# Patient Record
Sex: MALE | Race: WHITE | Employment: STUDENT | URBAN - METROPOLITAN AREA
[De-identification: names, ages, dates, MRNs, and addresses within clinical notes are randomized per-mention and may not be internally consistent; named-entity substitution may affect disease eponyms.]

---

## 2021-01-14 ENCOUNTER — TRANSFERRED RECORDS (OUTPATIENT)
Dept: MULTI SPECIALTY CLINIC | Facility: CLINIC | Age: 46
End: 2021-01-14
Payer: OTHER GOVERNMENT

## 2021-03-02 ENCOUNTER — APPOINTMENT (OUTPATIENT)
Dept: URGENT CARE | Facility: CLINIC | Age: 46
End: 2021-03-02
Payer: OTHER GOVERNMENT

## 2021-03-20 ENCOUNTER — HEALTH MAINTENANCE LETTER (OUTPATIENT)
Age: 46
End: 2021-03-20

## 2021-03-25 ENCOUNTER — IMMUNIZATION (OUTPATIENT)
Dept: NURSING | Facility: CLINIC | Age: 46
End: 2021-03-25
Payer: OTHER GOVERNMENT

## 2021-03-25 PROCEDURE — 91303 PR COVID VAC JANSSEN AD26 0.5ML: CPT

## 2021-03-25 PROCEDURE — 0031A PR COVID VAC JANSSEN AD26 0.5ML: CPT

## 2021-09-04 ENCOUNTER — HEALTH MAINTENANCE LETTER (OUTPATIENT)
Age: 46
End: 2021-09-04

## 2021-12-17 PROBLEM — I10 ESSENTIAL HYPERTENSION: Status: ACTIVE | Noted: 2019-01-02

## 2021-12-17 RX ORDER — FAMOTIDINE 20 MG/1
TABLET, FILM COATED ORAL
COMMUNITY
Start: 2021-02-25 | End: 2023-07-08

## 2021-12-17 RX ORDER — ALBUTEROL SULFATE 90 UG/1
AEROSOL, METERED RESPIRATORY (INHALATION)
COMMUNITY
Start: 2020-11-16 | End: 2023-01-12

## 2021-12-17 RX ORDER — LEVOTHYROXINE SODIUM 175 UG/1
TABLET ORAL
COMMUNITY
Start: 2020-12-18 | End: 2021-12-20

## 2021-12-17 RX ORDER — LOSARTAN POTASSIUM 100 MG/1
TABLET ORAL
COMMUNITY
Start: 2020-12-18 | End: 2021-12-17

## 2021-12-17 RX ORDER — ESOMEPRAZOLE MAGNESIUM 40 MG/1
CAPSULE, DELAYED RELEASE ORAL
COMMUNITY
Start: 2021-01-14 | End: 2021-12-17

## 2021-12-20 ENCOUNTER — OFFICE VISIT (OUTPATIENT)
Dept: FAMILY MEDICINE | Facility: CLINIC | Age: 46
End: 2021-12-20
Payer: OTHER GOVERNMENT

## 2021-12-20 VITALS
HEART RATE: 85 BPM | RESPIRATION RATE: 18 BRPM | OXYGEN SATURATION: 98 % | HEIGHT: 72 IN | SYSTOLIC BLOOD PRESSURE: 130 MMHG | BODY MASS INDEX: 29.3 KG/M2 | DIASTOLIC BLOOD PRESSURE: 85 MMHG | WEIGHT: 216.3 LBS | TEMPERATURE: 98.4 F

## 2021-12-20 DIAGNOSIS — E03.9 HYPOTHYROIDISM, UNSPECIFIED TYPE: ICD-10-CM

## 2021-12-20 DIAGNOSIS — I10 ESSENTIAL HYPERTENSION: Primary | ICD-10-CM

## 2021-12-20 DIAGNOSIS — R17 ELEVATED BILIRUBIN: ICD-10-CM

## 2021-12-20 DIAGNOSIS — J45.909 UNCOMPLICATED ASTHMA, UNSPECIFIED ASTHMA SEVERITY, UNSPECIFIED WHETHER PERSISTENT: ICD-10-CM

## 2021-12-20 DIAGNOSIS — Z13.220 SCREENING FOR LIPID DISORDERS: ICD-10-CM

## 2021-12-20 DIAGNOSIS — K22.70 BARRETT'S ESOPHAGUS WITHOUT DYSPLASIA: ICD-10-CM

## 2021-12-20 LAB
ALBUMIN SERPL-MCNC: 4.3 G/DL (ref 3.4–5)
ALP SERPL-CCNC: 56 U/L (ref 40–150)
ALT SERPL W P-5'-P-CCNC: 33 U/L (ref 0–70)
ANION GAP SERPL CALCULATED.3IONS-SCNC: 5 MMOL/L (ref 3–14)
AST SERPL W P-5'-P-CCNC: 20 U/L (ref 0–45)
BILIRUB SERPL-MCNC: 2.8 MG/DL (ref 0.2–1.3)
BUN SERPL-MCNC: 10 MG/DL (ref 7–30)
CALCIUM SERPL-MCNC: 9 MG/DL (ref 8.5–10.1)
CHLORIDE BLD-SCNC: 107 MMOL/L (ref 94–109)
CO2 SERPL-SCNC: 26 MMOL/L (ref 20–32)
CREAT SERPL-MCNC: 0.81 MG/DL (ref 0.66–1.25)
GFR SERPL CREATININE-BSD FRML MDRD: >90 ML/MIN/1.73M2
GLUCOSE BLD-MCNC: 91 MG/DL (ref 70–99)
POTASSIUM BLD-SCNC: 4.2 MMOL/L (ref 3.4–5.3)
PROT SERPL-MCNC: 7.3 G/DL (ref 6.8–8.8)
SODIUM SERPL-SCNC: 138 MMOL/L (ref 133–144)
TSH SERPL DL<=0.005 MIU/L-ACNC: 0.62 MU/L (ref 0.4–4)

## 2021-12-20 PROCEDURE — 80053 COMPREHEN METABOLIC PANEL: CPT | Performed by: PHYSICIAN ASSISTANT

## 2021-12-20 PROCEDURE — 99204 OFFICE O/P NEW MOD 45 MIN: CPT | Performed by: PHYSICIAN ASSISTANT

## 2021-12-20 PROCEDURE — 84443 ASSAY THYROID STIM HORMONE: CPT | Performed by: PHYSICIAN ASSISTANT

## 2021-12-20 PROCEDURE — 36415 COLL VENOUS BLD VENIPUNCTURE: CPT | Performed by: PHYSICIAN ASSISTANT

## 2021-12-20 PROCEDURE — 80061 LIPID PANEL: CPT | Performed by: PHYSICIAN ASSISTANT

## 2021-12-20 RX ORDER — LEVOTHYROXINE SODIUM 175 UG/1
175 TABLET ORAL DAILY
Qty: 90 TABLET | Refills: 4 | Status: SHIPPED | OUTPATIENT
Start: 2021-12-20 | End: 2023-01-12

## 2021-12-20 RX ORDER — LOSARTAN POTASSIUM 100 MG/1
100 TABLET ORAL DAILY
Qty: 90 TABLET | Refills: 4 | Status: SHIPPED | OUTPATIENT
Start: 2021-12-20 | End: 2023-01-12

## 2021-12-20 RX ORDER — ESOMEPRAZOLE MAGNESIUM 40 MG/1
40 CAPSULE, DELAYED RELEASE ORAL
Qty: 90 CAPSULE | Refills: 4 | Status: SHIPPED | OUTPATIENT
Start: 2021-12-20 | End: 2023-01-12

## 2021-12-20 ASSESSMENT — MIFFLIN-ST. JEOR: SCORE: 1894.13

## 2021-12-20 NOTE — PROGRESS NOTES
Assessment & Plan     Essential hypertension  - losartan (COZAAR) 100 MG tablet; Take 1 tablet (100 mg) by mouth daily  - Comprehensive metabolic panel    Hypothyroidism, unspecified type  - levothyroxine (SYNTHROID/LEVOTHROID) 175 MCG tablet; Take 1 tablet (175 mcg) by mouth daily  - TSH with free T4 reflex    Uncomplicated asthma, unspecified asthma severity, unspecified whether persistent  Will decrease advair dose as asthma was so well controlled.  Will send message in a  month to see how he is doing.    - fluticasone-salmeterol (ADVAIR) 250-50 MCG/DOSE inhaler; Inhale 1 puff into the lungs every 12 hours    John's esophagus without dysplasia    - esomeprazole (NEXIUM) 40 MG DR capsule; Take 1 capsule (40 mg) by mouth every morning (before breakfast)    Screening for lipid disorders    - Lipid panel reflex to direct LDL Fasting             BMI:   Estimated body mass index is 29.34 kg/m  as calculated from the following:    Height as of this encounter: 1.829 m (6').    Weight as of this encounter: 98.1 kg (216 lb 4.8 oz).   Weight management plan: Discussed healthy diet and exercise guidelines      Return in about 6 months (around 6/20/2022) for Asthma Recheck.    Lawanda Tovar PA-C  [facility]    Toby Correa is a 46 year old who presents for the following health issues     History of Present Illness     Asthma:  He presents for follow up of asthma.  He has some cough, some wheezing, and some shortness of breath. He is using a relief medication a few times a week. He does not miss any doses of his controller medication throughout the week.Patient is aware of the following triggers: cold air and exercise or sports. The patient has not had a visit to the Emergency Room, Urgent Care or Hospital due to asthma since the last clinic visit.     Hypertension: He presents for follow up of hypertension.  He does not check blood pressure  regularly outside of the clinic.  Outpatient blood pressures have not been over 140/90. He follows a low salt diet.     Hypothyroidism:     Since last visit, patient describes the following symptoms::  Fatigue    He eats 4 or more servings of fruits and vegetables daily.He consumes 0 sweetened beverage(s) daily.He exercises with enough effort to increase his heart rate 60 or more minutes per day.  He exercises with enough effort to increase his heart rate 4 days per week.   He is taking medications regularly.       Establish care:         Additional complaints: None  HPI additional notes: Sunny presents today with   Chief Complaint   Patient presents with     Establish Care            Review of Systems   Constitutional, HEENT, cardiovascular, pulmonary, gi and gu systems are negative, except as otherwise noted.      Objective    /85   Pulse 85   Temp 98.4  F (36.9  C) (Oral)   Resp 18   Ht 1.829 m (6')   Wt 98.1 kg (216 lb 4.8 oz)   SpO2 98%   BMI 29.34 kg/m    Body mass index is 29.34 kg/m .  Physical Exam     Physical Exam   GENERAL: healthy, alert, in no acute distress  EYES: Grossly normal to inspection, EOMI, PERRL  HENT: Mucous mebranes moist.  NECK: Non-tender, no adenopathy. Thyroid normal to inspection and palpation  RESP: lungs clear to auscultation - no rales, no rhonchi, no wheezes  CV: regular rate and rhythm, normal S1 S2. No peripheral edema.  SKIN: no suspicious lesions, no rashes  PSYCH: Alert and oriented times 3;  Able to articulate logical thoughts. Affect is normal.

## 2021-12-21 LAB
CHOLEST SERPL-MCNC: 193 MG/DL
FASTING STATUS PATIENT QL REPORTED: YES
HDLC SERPL-MCNC: 51 MG/DL
LDLC SERPL CALC-MCNC: 113 MG/DL
NONHDLC SERPL-MCNC: 142 MG/DL
TRIGL SERPL-MCNC: 144 MG/DL

## 2021-12-21 ASSESSMENT — ASTHMA QUESTIONNAIRES: ACT_TOTALSCORE: 25

## 2022-01-18 ENCOUNTER — MYC MEDICAL ADVICE (OUTPATIENT)
Dept: FAMILY MEDICINE | Facility: CLINIC | Age: 47
End: 2022-01-18
Payer: OTHER GOVERNMENT

## 2022-01-18 DIAGNOSIS — E80.4 GILBERT'S SYNDROME: ICD-10-CM

## 2022-01-20 PROBLEM — E80.4 GILBERT'S SYNDROME: Status: ACTIVE | Noted: 2022-01-20

## 2022-03-14 ENCOUNTER — ALLIED HEALTH/NURSE VISIT (OUTPATIENT)
Dept: FAMILY MEDICINE | Facility: CLINIC | Age: 47
End: 2022-03-14
Payer: OTHER GOVERNMENT

## 2022-03-14 DIAGNOSIS — Z11.1 SCREENING EXAMINATION FOR PULMONARY TUBERCULOSIS: Primary | ICD-10-CM

## 2022-03-14 PROCEDURE — 99207 PR NO CHARGE NURSE ONLY: CPT

## 2022-03-14 PROCEDURE — 86580 TB INTRADERMAL TEST: CPT

## 2022-03-14 NOTE — PROGRESS NOTES
Patient is here today for a Mantoux (TST) test placement.    Is there a current order in the chart? Yes    Reason for Mantoux (TST) in patient's own words: School    Patient needs form signed? Yes- completed per clinic's forms process.    Instructed patient to wait for 15 minutes post injection and to report any reactions immediately to staff.    Told patient to return to clinic in 48-72 hours to have Mantoux (TST) read.

## 2022-03-17 ENCOUNTER — ALLIED HEALTH/NURSE VISIT (OUTPATIENT)
Dept: FAMILY MEDICINE | Facility: CLINIC | Age: 47
End: 2022-03-17
Payer: OTHER GOVERNMENT

## 2022-03-17 DIAGNOSIS — Z11.1 SCREENING EXAMINATION FOR PULMONARY TUBERCULOSIS: Primary | ICD-10-CM

## 2022-03-17 LAB
PPDINDURATION: 0 MM (ref 0–4.99)
PPDREDNESS: NORMAL

## 2022-03-17 PROCEDURE — 99207 PR NO CHARGE NURSE ONLY: CPT

## 2022-03-17 NOTE — PROGRESS NOTES
Patient is here today for a Mantoux (TST) test results.    Did patient return to clinic 48-72 hours from Mantoux (TST) placement:   Yes -     PPD Induration   Date Value Ref Range Status   03/17/2022 0 0 - 4.99 mm Final     PPD Redness   Date Value Ref Range Status   03/17/2022 Not Present  Final         Induration Size? Induration <5mm - Enter results in Enter/Edit Activity. Route results to ordering provider.     Patient needs form signed? Yes. Follow clinic form process.     Patient reports having previously had the BCG Vaccine: No    Does patient need a two step?  Yes - placed order according to standing order or notified LP for need for next TST.  Instructed patient when to return to the clinic.       Cristopher PERRY RN

## 2022-03-28 ENCOUNTER — ALLIED HEALTH/NURSE VISIT (OUTPATIENT)
Dept: FAMILY MEDICINE | Facility: CLINIC | Age: 47
End: 2022-03-28
Payer: OTHER GOVERNMENT

## 2022-03-28 DIAGNOSIS — Z11.1 SCREENING EXAMINATION FOR PULMONARY TUBERCULOSIS: Primary | ICD-10-CM

## 2022-03-28 PROCEDURE — 99207 PR NO CHARGE NURSE ONLY: CPT

## 2022-03-28 PROCEDURE — 86580 TB INTRADERMAL TEST: CPT

## 2022-03-28 NOTE — PROGRESS NOTES
"Patient is here today for a Mantoux (TST) test placement.    Is there a current order in the chart? No. Placed order according to standing order (reference the \"Skin Test- Tuberculosis Screening- Ambulatory Care\" standing order in Policy Tech). Review the Inclusion and Exclusion Criteria.      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 Patient to return for second test in 1-3 weeks after first test is read.     Exclusion Criteria  None - Place order for Mantoux (TST) test per standing order.    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 Yes- completed per clinic's forms process.    Instructed patient to wait for 15 minutes post injection and to report any reactions immediately to staff.    Told patient to return to clinic in 48-72 hours to have Mantoux (TST) read.       Cristopher PERRY RN      "

## 2022-03-30 ENCOUNTER — ALLIED HEALTH/NURSE VISIT (OUTPATIENT)
Dept: FAMILY MEDICINE | Facility: CLINIC | Age: 47
End: 2022-03-30
Payer: OTHER GOVERNMENT

## 2022-03-30 DIAGNOSIS — Z11.1 SCREENING EXAMINATION FOR PULMONARY TUBERCULOSIS: Primary | ICD-10-CM

## 2022-03-30 LAB
PPDINDURATION: 0 MM (ref 0–4.99)
PPDREDNESS: NORMAL

## 2022-03-30 PROCEDURE — 99207 PR NO CHARGE NURSE ONLY: CPT

## 2022-03-30 NOTE — PROGRESS NOTES
Patient is here today for a Mantoux (TST) test results.    Did patient return to clinic 48-72 hours from Mantoux (TST) placement:   Yes -     PPD Induration   Date Value Ref Range Status   03/30/2022 0 0 - 4.99 mm Final     PPD Redness   Date Value Ref Range Status   03/30/2022 Not Present  Final       Induration Size? Induration <5mm - Enter results in Enter/Edit Activity. Route results to ordering provider.     Patient needs form signed? Yes. Follow clinic form process.     Patient reports having previously had the BCG Vaccine: No    Does patient need a two step? No - two step completed    Cristopher PERRY RN

## 2022-04-16 ENCOUNTER — HEALTH MAINTENANCE LETTER (OUTPATIENT)
Age: 47
End: 2022-04-16

## 2022-07-09 ENCOUNTER — VIRTUAL VISIT (OUTPATIENT)
Dept: URGENT CARE | Facility: URGENT CARE | Age: 47
End: 2022-07-09
Payer: OTHER GOVERNMENT

## 2022-07-09 DIAGNOSIS — U07.1 INFECTION DUE TO 2019 NOVEL CORONAVIRUS: Primary | ICD-10-CM

## 2022-07-09 PROCEDURE — 99441 PR PHYSICIAN TELEPHONE EVALUATION 5-10 MIN: CPT | Mod: CS | Performed by: PHYSICIAN ASSISTANT

## 2022-07-09 NOTE — PROGRESS NOTES
Sunny is a 47 year old who is being evaluated via a billable telephone visit.      What phone number would you like to be contacted at? [phone]  How would you like to obtain your AVS? MyChart    Assessment & Plan     Infection due to 2019 novel coronavirus  Acute problem.  Patient meets criteria for antiviral treatment.  Paxlovid is prescribed today.  Advised to keep monitoring symptoms.  Follow-up if any worsening symptoms.  Patient agrees.  - nirmatrelvir and ritonavir (PAXLOVID) therapy pack  Dispense: 30 each; Refill: 0       BMI:   Estimated body mass index is 29.34 kg/m  as calculated from the following:    Height as of 12/20/21: 1.829 m (6').    Weight as of 12/20/21: 98.1 kg (216 lb 4.8 oz).       Return in about 1 week (around 7/16/2022) for Symptoms failing to improve.    Rhea Bowen PA-C  [facility]   Sunny is a 47 year old, presenting for the following health issues:  Urgent Care and Cough (Positive for COVID-Fever Chills aches, and cough )      HPI     Telephone visit today with urgent care provider following a positive COVID test at home last night.  Patient reports symptoms started 2 days ago.  Fever, chills, body aches and cough.  Treatment tried ibuprofen and Tylenol.  No chest pain.  Slight chest tightness.  He has a history of asthma.    Review of Systems   Constitutional, HEENT, cardiovascular, pulmonary, GI, , musculoskeletal, neuro, skin, endocrine and psych systems are negative, except as otherwise noted.      Objective         Vitals:No vitals were obtained today due to virtual visit.    Physical Exam   healthy, alert and no distress  PSYCH: Alert and oriented times 3; coherent speech, normal   rate and volume, able to articulate logical thoughts, able   to abstract reason, no tangential thoughts, no hallucinations   or delusions  His affect is normal  RESP: No cough, no audible wheezing, able to talk in full sentences  Remainder of exam  unable to be completed due to telephone visits              Phone call duration: 7 minutes    .  ..

## 2022-08-11 ENCOUNTER — OFFICE VISIT (OUTPATIENT)
Dept: FAMILY MEDICINE | Facility: CLINIC | Age: 47
End: 2022-08-11
Payer: OTHER GOVERNMENT

## 2022-08-11 VITALS
WEIGHT: 215 LBS | HEIGHT: 72 IN | OXYGEN SATURATION: 98 % | HEART RATE: 84 BPM | BODY MASS INDEX: 29.12 KG/M2 | SYSTOLIC BLOOD PRESSURE: 130 MMHG | TEMPERATURE: 98.2 F | RESPIRATION RATE: 16 BRPM | DIASTOLIC BLOOD PRESSURE: 78 MMHG

## 2022-08-11 DIAGNOSIS — R53.83 OTHER FATIGUE: ICD-10-CM

## 2022-08-11 DIAGNOSIS — G47.00 INSOMNIA, UNSPECIFIED TYPE: Primary | ICD-10-CM

## 2022-08-11 DIAGNOSIS — J45.40 MODERATE PERSISTENT ASTHMA, UNSPECIFIED WHETHER COMPLICATED: ICD-10-CM

## 2022-08-11 LAB — HGB BLD-MCNC: 10.6 G/DL (ref 13.3–17.7)

## 2022-08-11 PROCEDURE — 85018 HEMOGLOBIN: CPT | Performed by: FAMILY MEDICINE

## 2022-08-11 PROCEDURE — 99214 OFFICE O/P EST MOD 30 MIN: CPT | Performed by: FAMILY MEDICINE

## 2022-08-11 PROCEDURE — 84443 ASSAY THYROID STIM HORMONE: CPT | Performed by: FAMILY MEDICINE

## 2022-08-11 PROCEDURE — 36415 COLL VENOUS BLD VENIPUNCTURE: CPT | Performed by: FAMILY MEDICINE

## 2022-08-11 RX ORDER — MONTELUKAST SODIUM 10 MG/1
10 TABLET ORAL AT BEDTIME
Qty: 90 TABLET | Refills: 1 | Status: SHIPPED | OUTPATIENT
Start: 2022-08-11 | End: 2022-09-22 | Stop reason: SINTOL

## 2022-08-11 RX ORDER — ZOLPIDEM TARTRATE 6.25 MG/1
6.25 TABLET, FILM COATED, EXTENDED RELEASE ORAL
Qty: 30 TABLET | Refills: 0 | Status: SHIPPED | OUTPATIENT
Start: 2022-08-11 | End: 2022-09-22

## 2022-08-11 RX ORDER — FLUTICASONE PROPIONATE AND SALMETEROL 500; 50 UG/1; UG/1
1 POWDER RESPIRATORY (INHALATION) EVERY 12 HOURS
Qty: 60 EACH | Refills: 3 | Status: SHIPPED | OUTPATIENT
Start: 2022-08-11 | End: 2023-01-12

## 2022-08-11 ASSESSMENT — ASTHMA QUESTIONNAIRES
QUESTION_2 LAST FOUR WEEKS HOW OFTEN HAVE YOU HAD SHORTNESS OF BREATH: MORE THAN ONCE A DAY
ACT_TOTALSCORE: 14
QUESTION_4 LAST FOUR WEEKS HOW OFTEN HAVE YOU USED YOUR RESCUE INHALER OR NEBULIZER MEDICATION (SUCH AS ALBUTEROL): TWO OR THREE TIMES PER WEEK
QUESTION_5 LAST FOUR WEEKS HOW WOULD YOU RATE YOUR ASTHMA CONTROL: POORLY CONTROLLED
QUESTION_3 LAST FOUR WEEKS HOW OFTEN DID YOUR ASTHMA SYMPTOMS (WHEEZING, COUGHING, SHORTNESS OF BREATH, CHEST TIGHTNESS OR PAIN) WAKE YOU UP AT NIGHT OR EARLIER THAN USUAL IN THE MORNING: NOT AT ALL
ACT_TOTALSCORE: 14
QUESTION_1 LAST FOUR WEEKS HOW MUCH OF THE TIME DID YOUR ASTHMA KEEP YOU FROM GETTING AS MUCH DONE AT WORK, SCHOOL OR AT HOME: SOME OF THE TIME

## 2022-08-11 ASSESSMENT — PATIENT HEALTH QUESTIONNAIRE - PHQ9
SUM OF ALL RESPONSES TO PHQ QUESTIONS 1-9: 10
10. IF YOU CHECKED OFF ANY PROBLEMS, HOW DIFFICULT HAVE THESE PROBLEMS MADE IT FOR YOU TO DO YOUR WORK, TAKE CARE OF THINGS AT HOME, OR GET ALONG WITH OTHER PEOPLE: VERY DIFFICULT
SUM OF ALL RESPONSES TO PHQ QUESTIONS 1-9: 10

## 2022-08-11 ASSESSMENT — ANXIETY QUESTIONNAIRES
6. BECOMING EASILY ANNOYED OR IRRITABLE: NEARLY EVERY DAY
7. FEELING AFRAID AS IF SOMETHING AWFUL MIGHT HAPPEN: NOT AT ALL
7. FEELING AFRAID AS IF SOMETHING AWFUL MIGHT HAPPEN: NOT AT ALL
GAD7 TOTAL SCORE: 7
4. TROUBLE RELAXING: SEVERAL DAYS
IF YOU CHECKED OFF ANY PROBLEMS ON THIS QUESTIONNAIRE, HOW DIFFICULT HAVE THESE PROBLEMS MADE IT FOR YOU TO DO YOUR WORK, TAKE CARE OF THINGS AT HOME, OR GET ALONG WITH OTHER PEOPLE: VERY DIFFICULT
GAD7 TOTAL SCORE: 7
8. IF YOU CHECKED OFF ANY PROBLEMS, HOW DIFFICULT HAVE THESE MADE IT FOR YOU TO DO YOUR WORK, TAKE CARE OF THINGS AT HOME, OR GET ALONG WITH OTHER PEOPLE?: VERY DIFFICULT
5. BEING SO RESTLESS THAT IT IS HARD TO SIT STILL: NOT AT ALL
GAD7 TOTAL SCORE: 7
3. WORRYING TOO MUCH ABOUT DIFFERENT THINGS: SEVERAL DAYS
1. FEELING NERVOUS, ANXIOUS, OR ON EDGE: SEVERAL DAYS
2. NOT BEING ABLE TO STOP OR CONTROL WORRYING: SEVERAL DAYS

## 2022-08-11 ASSESSMENT — ENCOUNTER SYMPTOMS
SHORTNESS OF BREATH: 1
FATIGUE: 1
SLEEP DISTURBANCE: 1

## 2022-08-11 NOTE — PROGRESS NOTES
Assessment & Plan     Insomnia, unspecified type  Refractory to sleep hygiene, melatonin and did not tolerate trazodone.  Okay to restart Ambien, recommend Ambien extended release for sleep maintenance.  Discussed that this is not meant to be chronic therapy.  Continue to monitor for mental health.  - zolpidem ER (AMBIEN CR) 6.25 MG CR tablet  Dispense: 30 tablet; Refill: 0    Moderate persistent asthma, unspecified whether complicated  Uncontrolled following COVID-19 infection, increase Advair dose to 500-50, start Singulair.  Recommend ACT at next visit.  - fluticasone-salmeterol (ADVAIR) 500-50 MCG/ACT inhaler  Dispense: 60 each; Refill: 3  - montelukast (SINGULAIR) 10 MG tablet  Dispense: 90 tablet; Refill: 1    Other fatigue  - Hemoglobin  - TSH with free T4 reflex  - Hemoglobin  - TSH with free T4 reflex     :[id number]}  Depression Screening Follow Up    PHQ 8/11/2022   PHQ-9 Total Score 10   Q9: Thoughts of better off dead/self-harm past 2 weeks Not at all         Return in about 1 month (around 9/11/2022) for Annual Preventative Visit..    Bubba Powers MD  [facility]    Tboy Correa is a 47 year old, presenting for the following health issues:  Sleep Problem and Asthma      History of Present Illness     Asthma:  He presents for follow up of asthma.  He has some cough, some wheezing, and some shortness of breath. He is using a relief medication a few times a week. He does not miss any doses of his controller medication throughout the week.Patient is aware of the following triggers: unaware of any triggers. The patient has had a visit to the Emergency Room, Urgent Care or Hospital due to asthma since the last clinic visit. He has been to the Emergency Room or Urgent Care 0 times.He has had a Hospitalization 0 times.    Hypothyroidism:     Since last visit, patient describes the following symptoms::  Fatigue and Weight gain    Weight gain::  6-10 lbs.    Reason for visit:  Trouble  sleeping, exercise intolerance, increased work of breathing    He eats 4 or more servings of fruits and vegetables daily.He consumes 0 sweetened beverage(s) daily.He exercises with enough effort to increase his heart rate 20 to 29 minutes per day.  He exercises with enough effort to increase his heart rate 3 or less days per week. He is missing 1 dose(s) of medications per week.    Today's PHQ-9         PHQ-9 Total Score: 10    PHQ-9 Q9 Thoughts of better off dead/self-harm past 2 weeks :   Not at all    How difficult have these problems made it for you to do your work, take care of things at home, or get along with other people: Very difficult  Today's HOLLIE-7 Score: 7       Patient is a pleasant 47-year-old male with history of beta thalassemia, hypothyroidism, asthma and hypertension who presents to [facility] care and for concerns of shortness of breath, fatigue and insomnia.    Had COVID 1 month ago, he is vaccinated with [employer]ukh [employer] Sukh boosted with Moderna, still has shortness of breath and self escalated his Advair to a previous prescription of 500-50 with improvement of symptoms.  Requesting a refill on the higher dose.    Additionally has difficulty with sleep maintenance.  Gets up at 2 AM, denies anxiety at that time.  Trazodone caused morning grogginess, Ambien has worked well for him in the past.        Review of Systems   Constitutional: Positive for fatigue.   Respiratory: Positive for shortness of breath.    Psychiatric/Behavioral: Positive for sleep disturbance.            Objective    /78   Pulse 84   Temp 98.2  F (36.8  C) (Oral)   Resp 16   Ht 1.829 m (6')   Wt 97.5 kg (215 lb)   SpO2 98%   BMI 29.16 kg/m    Body mass index is 29.16 kg/m .  Physical Exam  Vitals reviewed.   Constitutional:       Appearance: He is not ill-appearing.   Cardiovascular:      Rate and Rhythm: Normal rate and regular rhythm.   Pulmonary:      Effort: Pulmonary effort is normal.      Breath sounds: Normal  breath sounds.   Psychiatric:         Thought Content: Thought content normal.         Judgment: Judgment normal.            Results for orders placed or performed in visit on 08/11/22 (from the past 24 hour(s))   Hemoglobin   Result Value Ref Range    Hemoglobin 10.6 (L) 13.3 - 17.7 g/dL                   .  ..

## 2022-08-12 LAB — TSH SERPL DL<=0.005 MIU/L-ACNC: 0.54 MU/L (ref 0.4–4)

## 2022-09-14 ENCOUNTER — OFFICE VISIT (OUTPATIENT)
Dept: PODIATRY | Facility: CLINIC | Age: 47
End: 2022-09-14
Payer: OTHER GOVERNMENT

## 2022-09-14 VITALS — DIASTOLIC BLOOD PRESSURE: 86 MMHG | BODY MASS INDEX: 27.8 KG/M2 | WEIGHT: 205 LBS | SYSTOLIC BLOOD PRESSURE: 138 MMHG

## 2022-09-14 DIAGNOSIS — M77.8 CAPSULITIS OF FOOT, RIGHT: ICD-10-CM

## 2022-09-14 DIAGNOSIS — M72.2 PLANTAR FASCIAL FIBROMATOSIS OF LEFT FOOT: ICD-10-CM

## 2022-09-14 DIAGNOSIS — M79.671 BILATERAL FOOT PAIN: Primary | ICD-10-CM

## 2022-09-14 DIAGNOSIS — M21.42 ACQUIRED PES PLANUS, LEFT: ICD-10-CM

## 2022-09-14 DIAGNOSIS — M79.672 BILATERAL FOOT PAIN: Primary | ICD-10-CM

## 2022-09-14 PROCEDURE — 20550 NJX 1 TENDON SHEATH/LIGAMENT: CPT | Mod: RT | Performed by: PODIATRIST

## 2022-09-14 PROCEDURE — 99203 OFFICE O/P NEW LOW 30 MIN: CPT | Mod: 25 | Performed by: PODIATRIST

## 2022-09-14 RX ORDER — TRIAMCINOLONE ACETONIDE 40 MG/ML
40 INJECTION, SUSPENSION INTRA-ARTICULAR; INTRAMUSCULAR
Status: DISCONTINUED | OUTPATIENT
Start: 2022-09-14 | End: 2023-07-08

## 2022-09-14 RX ORDER — DICLOFENAC SODIUM 75 MG/1
75 TABLET, DELAYED RELEASE ORAL 2 TIMES DAILY
Qty: 28 TABLET | Refills: 0 | Status: SHIPPED | OUTPATIENT
Start: 2022-09-14 | End: 2023-06-26

## 2022-09-14 RX ORDER — BUPIVACAINE HYDROCHLORIDE 5 MG/ML
2 INJECTION, SOLUTION EPIDURAL; INTRACAUDAL
Status: DISCONTINUED | OUTPATIENT
Start: 2022-09-14 | End: 2023-07-08

## 2022-09-14 RX ADMIN — TRIAMCINOLONE ACETONIDE 40 MG: 40 INJECTION, SUSPENSION INTRA-ARTICULAR; INTRAMUSCULAR at 16:45

## 2022-09-14 RX ADMIN — BUPIVACAINE HYDROCHLORIDE 2 ML: 5 INJECTION, SOLUTION EPIDURAL; INTRACAUDAL at 16:45

## 2022-09-14 NOTE — PATIENT INSTRUCTIONS
Thank you for choosing [facility] Podiatry / Foot & Ankle Surgery!    DR CHAPPELL'S CLINIC:  [facility]   [id number]   [place] [address]      TRIAGE LINE: [phone]  APPOINTMENTS: [phone]  RADIOLOGY: [phone]  SET UP SURGERY: [phone]  FAX NUMBER: [phone]  BILLING QUESTIONS: [phone]       Follow up: New Balance inserts with Metatarsal pad      CAPSULITIS / METATARSALGIA  All joints in the body are surrounded by a capsule, or a covering of soft tissue and ligaments. The capsule holds bones together and secretes joint fluid to help lubricate the joint. If a joint capsule is exposed to excessive force, it can develop microscopic tears and become inflamed. This commonly occurs in the foot due to mild variation in anatomy. Hammertoes, bunions, irregular bone length, joint immobility, etc. can all lead to excessive force on the joint. Capsule injury can also occur due to repetitive stress from exercise, insufficient support from shoes, excessive bare foot walking and excessive weight.      Conservative treatments include ice, rest from the aggravating activity, weight loss, orthotic inserts, improving shoes and shoe modifications. Appropriate shoes will protect the inflamed tissue improving the chances of healing. Avoidance of standing or walking barefoot, including around the house, is necessary to allow healing. Casts are sometimes used for more aggressive protection.  NSAIDs such as Advil are also used to help with pain and decreasing inflammation. If pain continues over a period of weeks with continuous rest and icing, Corticosteroid injections can be a treatment option to try and help decrease inflammation.    Surgery is often necessary to correct the underlying structural problem. Surgery might include shortening an excessively long bone, repairing bunion or hammertoe, lengthening a tight Achilles  tendon, etc. These are same day surgeries that might be  pursued if more conservative measures fail to provide relief.      The inflamed joint capsule has the potential to completely tear. This will allow the toe to drift off the ground, curving toward the other toes. The involved toe may under or overlap the adjacent toes as drift continues. The pain may improve after the joint tears or this new position will be permanent. Surgery can address the toe alignment. Your goal of treating capsulitis is to avoid this scenario.       FLAT FEET   Flatfoot is often a complex disorder, with diverse symptoms and varying degrees of deformity and disability. There are several types of flatfoot, all of which have one characteristic in common: partial or total collapse (loss) of the arch.  Other characteristics shared by most types of flatfoot include:   Toe drift,  in which the toes and front part of the foot point outward   The heel tilts toward the outside and the ankle appears to turn in   A tight Achilles tendon, which causes the heel to lift off the ground earlier when walking and may make the problem worse   Bunions and hammertoes may develop as a result of a flatfoot.   Flexible Flatfoot  Flexible flatfoot is one of the most common types of flatfoot. It typically begins in childhood or adolescence and continues into adulthood. It usually occurs in both feet and progresses in severity throughout the adult years. As the deformity worsens, the soft tissues (tendons and ligaments) of the arch may stretch or tear and can become inflamed.  The term  flexible  means that while the foot is flat when standing (weight-bearing), the arch returns when not standing.  SYMPTOMS  Pain in the heel, arch, ankle, or along the outside of the foot    Rolled-in  ankle (over-pronation)   Pain along the shin bone (shin splint)   General aching or fatigue in the foot or leg   Low back, hip or knee pain.   DIAGNOSIS  In diagnosing flatfoot, the foot and ankle surgeon examines the foot and observes how it  looks when you stand and sit. X-rays are usually taken to determine the severity of the disorder. If you are diagnosed with flexible flatfoot but you don t have any symptoms, your surgeon will explain what you might expect in the future.  NON-SURGICAL TREATMENT  If you experience symptoms with flexible flatfoot, the surgeon may recommend non-surgical treatment options, including:  Activity modifications. Cut down on activities that bring you pain and avoid prolonged walking and standing to give your arches a rest.   Weight loss. If you are overweight, try to lose weight. Putting too much weight on your arches may aggravate your symptoms.   Orthotic devices. Your foot and ankle surgeon can provide you with custom orthotic devices for your shoes to give more support to the arches.   Immobilization. In some cases, it may be necessary to use a walking cast or to completely avoid weight-bearing.   Medications. Nonsteroidal anti-inflammatory drugs (NSAIDs), such as ibuprofen, help reduce pain and inflammation.   Physical therapy. Ultrasound therapy or other physical therapy modalities may be used to provide temporary relief.   Shoe modifications. Wearing shoes that support the arches is important for anyone who has flatfoot.   SURGICAL TREATMENT  In some patients whose pain is not adequately relieved by other treatments, surgery may be considered. A variety of surgical techniques is available to correct flexible flatfoot, and one or a combination of procedures may be required to relieve the symptoms and improve foot function.  In selecting the procedure or combination of procedures for your particular case, the foot and ankle surgeon will take into consideration the extent of your deformity based on the x-ray findings, your age, your activity level, and other factors. The length of the recovery period will vary, depending on the procedure or procedures performed.    PLANTAR FIBROMA  A plantar fibroma is a fibrous knot  (nodule) in the arch of the foot. It is embedded within the plantar fascia, a band of tissue that extends from the heel to the toes on the bottom of the foot. A plantar fibroma can develop in one or both feet, is benign (non-malignant), and usually will not go away or get smaller without treatment. Definitive causes for this condition have not been clearly identified.    SIGNS & SYMPTOMS  The characteristic sign of a plantar fibroma is a noticeable lump in the arch that feels firm to the touch. This mass can remain the same size or get larger over time, or additional fibromas may develop.  People who have a plantar fibroma may or may not have pain. When pain does occur, it is often caused by shoes pushing against the lump in the arch, although it can also arise when walking or standing barefoot.    DIAGNOSIS  To diagnose a plantar fibroma, the foot and ankle surgeon will examine the foot and press on the affected area. Sometimes this can produce pain that extends down to the toes. An MRI or biopsy may be performed to further evaluate the lump and aid in diagnosis.    TREATMENT OPTIONS   Non-surgical treatment may help relieve the pain of a plantar fibroma, although it will not make the mass disappear. The foot and ankle surgeon may select one or more of the following non-surgical options:     Steroid injections. Injecting corticosteroid medication into the mass may help  shrink it and thereby relieve the pain that occurs when walking. This reduction  may be only temporary and the fibroma could slowly return to its original size.      Orthotic devices. If the fibroma is stable, meaning it is not changing in size,  custom orthotic devices (shoe inserts) may relieve the pain by distributing the  patient s weight away from the fibroma.      Physical therapy. The pain is sometimes treated through physical therapy  methods that deliver anti-inflammatory medication into the fibroma without the  need for injection.  Cross  friction massage.     Verapamil Cream: Applying 10% or higher verapamil cream can help to decrease  size.     Surgery: If the mass increases in size or pain, the patient should be further  evaluated. Surgical treatment to remove the fibroma is considered if the patient  continues to experience pain following non-surgical approaches. Surgical  removal of a plantar fibroma may result in a flattening of the arch or  development of hammertoes. Orthotic devices may be prescribed to provide  support to the foot. Due to the high incidence of recurrence with this condition,  continued follow-up with the foot and ankle surgeon is recommended.

## 2022-09-14 NOTE — LETTER
9/14/2022         RE: Sunny Hayes  [address] [facility] [id number]        Dear Colleague,    Thank you for referring your patient, Sunny Hayes, to the [facility]. Please see a copy of my visit note below.    PATIENT HISTORY:  Sunny Hayes is a 47 year old male who presents to clinic for pain to the ball of the right foot and arch of the left.  Left foot is worse.  Has been going on for 3 to 4 weeks.  Notes that it is aching.  Worse with increased activity.  Feels like he is walking on something.  He has tried rolling a ball under his foot but it has not helped.  Denies specific injury.  Wondering what is causing the pain and what can be done for it.    Review of Systems:  Patient denies fever, chills, rash, wound, stiffness, numbness, weakness, heart burn, blood in stool, chest pain with activity, calf pain when walking, shortness of breath with activity, chronic cough, easy bleeding/bruising, swelling of ankles, excessive thirst, fatigue, depression, anxiety.  Patient admits to limping at times.     PAST MEDICAL HISTORY:   Past Medical History:   Diagnosis Date     Gastroesophageal reflux disease      Hypertension      Hypothyroidism      Uncomplicated asthma         PAST SURGICAL HISTORY: No past surgical history on file.     MEDICATIONS:   Current Outpatient Medications:      albuterol (PROAIR HFA/PROVENTIL HFA/VENTOLIN HFA) 108 (90 Base) MCG/ACT inhaler, , Disp: , Rfl:      esomeprazole (NEXIUM) 40 MG DR capsule, Take 1 capsule (40 mg) by mouth every morning (before breakfast), Disp: 90 capsule, Rfl: 4     famotidine (PEPCID) 20 MG tablet, , Disp: , Rfl:      fluticasone-salmeterol (ADVAIR) 500-50 MCG/ACT inhaler, Inhale 1 puff into the lungs every 12 hours, Disp: 60 each, Rfl: 3     levothyroxine (SYNTHROID/LEVOTHROID) 175 MCG tablet, Take 1 tablet (175 mcg) by mouth daily, Disp: 90 tablet, Rfl: 4     losartan (COZAAR) 100 MG tablet, Take 1 tablet (100  mg) by mouth daily, Disp: 90 tablet, Rfl: 4     montelukast (SINGULAIR) 10 MG tablet, Take 1 tablet (10 mg) by mouth At Bedtime, Disp: 90 tablet, Rfl: 1     zolpidem ER (AMBIEN CR) 6.25 MG CR tablet, Take 1 tablet (6.25 mg) by mouth nightly as needed for sleep, Disp: 30 tablet, Rfl: 0     ALLERGIES:    Allergies   Allergen Reactions     Ace Inhibitors Unknown     Bicillin C-R, Unknown     Hydrochlorothiazide Unknown     Penicillins Difficulty breathing, Hives, Itching and Swelling     rash          SOCIAL HISTORY:   Social History     Socioeconomic History     Marital status:      Spouse name: Not on file     Number of children: Not on file     Years of education: Not on file     Highest education level: Not on file   Occupational History     Not on file   Tobacco Use     Smoking status: Former Smoker     Quit date: 2003     Years since quittin.7     Smokeless tobacco: Former User     Quit date: 2010   Vaping Use     Vaping Use: Never used   Substance and Sexual Activity     Alcohol use: Not on file     Comment: 1-2 drinks per week     Drug use: Never     Sexual activity: Yes     Partners: Female   Other Topics Concern     Not on file   Social History Narrative     Not on file     Social Determinants of Health     Financial Resource Strain: Not on file   Food Insecurity: Not on file   Transportation Needs: Not on file   Physical Activity: Not on file   Stress: Not on file   Social Connections: Not on file   Intimate Partner Violence: Not on file   Housing Stability: Not on file        FAMILY HISTORY: No family history on file.     EXAM:Vitals: /86   Wt 93 kg (205 lb)   BMI 27.80 kg/m    BMI= Body mass index is 27.8 kg/m .    General appearance: Patient is alert and fully cooperative with history & exam.  No sign of distress is noted during the visit.     Psychiatric: Affect is pleasant & appropriate.  Patient appears motivated to improve health.     Respiratory: Breathing is regular &  unlabored while sitting.     HEENT: Hearing is intact to spoken word.  Speech is clear.  No gross evidence of visual impairment that would impact ambulation.     Dermatologic: Skin is intact to both lower extremities without significant lesions, rash or abrasion.  No paronychia or evidence of soft tissue infection is noted.     Vascular: DP & PT pulses are intact & regular bilaterally.  No significant edema or varicosities noted.  CFT and skin temperature is normal to both lower extremities.     Neurologic: Lower extremity sensation is intact to light touch.  No evidence of weakness or contracture in the lower extremities.  No evidence of neuropathy.     Musculoskeletal: Patient is ambulatory without assistive device or brace.  Decreased.  Painful small palpable mass noted to the medial.  Minimal pain on palpation of the distal plantar aspect of the right second metatarsal head.  Minimal decreased range of motion of the right first metatarsal phalangeal joint.    Radiographs: Bilateral foot x-rays - I personally reviewed the xrays -minimal degenerative changes to the first metatarsal phalangeal joints.  Elongated second metatarsals.  No fractures are noted.  Decreased calcaneal clinician angle noted on the left foot.     ASSESSMENT:    Bilateral foot pain  Capsulitis of foot, right  Acquired pes planus, left  Plantar fascial fibromatosis of left foot     Medical Decision Making/Plan:  Reviewed patient's chart in [facility].  Reviewed and discussed x-rays. Reviewed and discussed causes of capsulitis.  Talked about how the elongated 2nd metatarsal can cause more pressure to occur to the joint.  We talked about treatments such as padding, orthotics, injection, physical therapy, immobilization, MRI, and possible surgery to shorten metatarsal.    At this time he is going to try topical pain cream inserts with metatarsal pad.    We also talked about causes of plantar fibromas.  They are fibrous masses in the plantar fascia that  are benign.  Discussed treatment such as padding, shoe gear, injections, physical therapy.  We also discussed that sometimes these require surgical removal.  Normally this is a last resort.  Risks of surgery including high recurrence rate, continued pain, painful scarring.     At this time we will try an injection to try to help with pain.  We will also do an oral anti-inflammatory.  Pain continues would recommend an MRI of the first possible ankle to assess for any further pathology.  All questions were answered to patient's satisfaction and he will call for the questions or concerns.    Procedure: Medium Joint Injection/Arthrocentesis: L ankle    Date/Time: 9/14/2022 4:45 PM  Performed by: Maggi Reaves DPM, Podiatry/Foot and Ankle Surgery  Authorized by: Maggi Reaves DPM, Podiatry/Foot and Ankle Surgery     Indications:  Pain  Needle Size:  25 G  Guidance: surface landmarks    Approach:  Medial  Location:  Ankle  Location comment:  Left plantar fibroma arch injection  Site:  L ankle  Medications:  40 mg triamcinolone 40 MG/ML; 2 mL bupivacaine (PF) 0.5 %  Outcome:  Tolerated well, no immediate complications  Procedure discussed: discussed risks, benefits, and alternatives    Consent Given by:  Patient  Timeout: timeout called immediately prior to procedure    Prep: patient was prepped and draped in usual sterile fashion            Patient risk factor: Patient is at low risk for infection.        Maggi Reaves DPM, Podiatry/Foot and Ankle Surgery        Again, thank you for allowing me to participate in the care of your patient.        Sincerely,        Maggi Reaves DPM, Podiatry/Foot and Ankle Surgery

## 2022-09-14 NOTE — PROGRESS NOTES
PATIENT HISTORY:  Sunny Hayes is a 47 year old male who presents to clinic for pain to the ball of the right foot and arch of the left.  Left foot is worse.  Has been going on for 3 to 4 weeks.  Notes that it is aching.  Worse with increased activity.  Feels like he is walking on something.  He has tried rolling a ball under his foot but it has not helped.  Denies specific injury.  Wondering what is causing the pain and what can be done for it.    Review of Systems:  Patient denies fever, chills, rash, wound, stiffness, numbness, weakness, heart burn, blood in stool, chest pain with activity, calf pain when walking, shortness of breath with activity, chronic cough, easy bleeding/bruising, swelling of ankles, excessive thirst, fatigue, depression, anxiety.  Patient admits to limping at times.     PAST MEDICAL HISTORY:   Past Medical History:   Diagnosis Date     Gastroesophageal reflux disease      Hypertension      Hypothyroidism      Uncomplicated asthma         PAST SURGICAL HISTORY: No past surgical history on file.     MEDICATIONS:   Current Outpatient Medications:      albuterol (PROAIR HFA/PROVENTIL HFA/VENTOLIN HFA) 108 (90 Base) MCG/ACT inhaler, , Disp: , Rfl:      esomeprazole (NEXIUM) 40 MG DR capsule, Take 1 capsule (40 mg) by mouth every morning (before breakfast), Disp: 90 capsule, Rfl: 4     famotidine (PEPCID) 20 MG tablet, , Disp: , Rfl:      fluticasone-salmeterol (ADVAIR) 500-50 MCG/ACT inhaler, Inhale 1 puff into the lungs every 12 hours, Disp: 60 each, Rfl: 3     levothyroxine (SYNTHROID/LEVOTHROID) 175 MCG tablet, Take 1 tablet (175 mcg) by mouth daily, Disp: 90 tablet, Rfl: 4     losartan (COZAAR) 100 MG tablet, Take 1 tablet (100 mg) by mouth daily, Disp: 90 tablet, Rfl: 4     montelukast (SINGULAIR) 10 MG tablet, Take 1 tablet (10 mg) by mouth At Bedtime, Disp: 90 tablet, Rfl: 1     zolpidem ER (AMBIEN CR) 6.25 MG CR tablet, Take 1 tablet (6.25 mg) by mouth nightly as needed for sleep,  Disp: 30 tablet, Rfl: 0     ALLERGIES:    Allergies   Allergen Reactions     Ace Inhibitors Unknown     Bicillin C-R, Unknown     Hydrochlorothiazide Unknown     Penicillins Difficulty breathing, Hives, Itching and Swelling     rash          SOCIAL HISTORY:   Social History     Socioeconomic History     Marital status:      Spouse name: Not on file     Number of children: Not on file     Years of education: Not on file     Highest education level: Not on file   Occupational History     Not on file   Tobacco Use     Smoking status: Former Smoker     Quit date: 2003     Years since quittin.7     Smokeless tobacco: Former User     Quit date: 2010   Vaping Use     Vaping Use: Never used   Substance and Sexual Activity     Alcohol use: Not on file     Comment: 1-2 drinks per week     Drug use: Never     Sexual activity: Yes     Partners: Female   Other Topics Concern     Not on file   Social History Narrative     Not on file     Social Determinants of Health     Financial Resource Strain: Not on file   Food Insecurity: Not on file   Transportation Needs: Not on file   Physical Activity: Not on file   Stress: Not on file   Social Connections: Not on file   Intimate Partner Violence: Not on file   Housing Stability: Not on file        FAMILY HISTORY: No family history on file.     EXAM:Vitals: /86   Wt 93 kg (205 lb)   BMI 27.80 kg/m    BMI= Body mass index is 27.8 kg/m .    General appearance: Patient is alert and fully cooperative with history & exam.  No sign of distress is noted during the visit.     Psychiatric: Affect is pleasant & appropriate.  Patient appears motivated to improve health.     Respiratory: Breathing is regular & unlabored while sitting.     HEENT: Hearing is intact to spoken word.  Speech is clear.  No gross evidence of visual impairment that would impact ambulation.     Dermatologic: Skin is intact to both lower extremities without significant lesions, rash or  abrasion.  No paronychia or evidence of soft tissue infection is noted.     Vascular: DP & PT pulses are intact & regular bilaterally.  No significant edema or varicosities noted.  CFT and skin temperature is normal to both lower extremities.     Neurologic: Lower extremity sensation is intact to light touch.  No evidence of weakness or contracture in the lower extremities.  No evidence of neuropathy.     Musculoskeletal: Patient is ambulatory without assistive device or brace.  Decreased.  Painful small palpable mass noted to the medial.  Minimal pain on palpation of the distal plantar aspect of the right second metatarsal head.  Minimal decreased range of motion of the right first metatarsal phalangeal joint.    Radiographs: Bilateral foot x-rays - I personally reviewed the xrays -minimal degenerative changes to the first metatarsal phalangeal joints.  Elongated second metatarsals.  No fractures are noted.  Decreased calcaneal clinician angle noted on the left foot.     ASSESSMENT:    Bilateral foot pain  Capsulitis of foot, right  Acquired pes planus, left  Plantar fascial fibromatosis of left foot     Medical Decision Making/Plan:  Reviewed [facility] chart in [facility].  Reviewed and discussed x-rays. Reviewed and discussed causes of capsulitis.  Talked about how the elongated 2nd metatarsal can cause more pressure to occur to the joint.  We talked about treatments such as padding, orthotics, injection, physical therapy, immobilization, MRI, and possible surgery to shorten metatarsal.    At this time he is going to try topical pain cream inserts with metatarsal pad.    We also talked about causes of plantar fibromas.  They are fibrous masses in the plantar fascia that are benign.  Discussed treatment such as padding, shoe gear, injections, physical therapy.  We also discussed that sometimes these require surgical removal.  Normally this is a last resort.  Risks of surgery including high recurrence rate, continued  pain, painful scarring.     At this time we will try an injection to try to help with pain.  We will also do an oral anti-inflammatory.  Pain continues would recommend an MRI of the first possible ankle to assess for any further pathology.  All questions were answered to patient's satisfaction and he will call for the questions or concerns.    Procedure: Medium Joint Injection/Arthrocentesis: L ankle    Date/Time: 9/14/2022 4:45 PM  Performed by: Maggi Reaves DPM, Podiatry/Foot and Ankle Surgery  Authorized by: Maggi Reaves DPM, Podiatry/Foot and Ankle Surgery     Indications:  Pain  Needle Size:  25 G  Guidance: surface landmarks    Approach:  Medial  Location:  Ankle  Location comment:  Left plantar fibroma arch injection  Site:  L ankle  Medications:  40 mg triamcinolone 40 MG/ML; 2 mL bupivacaine (PF) 0.5 %  Outcome:  Tolerated well, no immediate complications  Procedure discussed: discussed risks, benefits, and alternatives    Consent Given by:  Patient  Timeout: timeout called immediately prior to procedure    Prep: patient was prepped and draped in usual sterile fashion            Patient risk factor: Patient is at low risk for infection.        Maggi Reaves DPM, Podiatry/Foot and Ankle Surgery

## 2022-09-22 ENCOUNTER — OFFICE VISIT (OUTPATIENT)
Dept: FAMILY MEDICINE | Facility: CLINIC | Age: 47
End: 2022-09-22
Payer: OTHER GOVERNMENT

## 2022-09-22 VITALS
HEIGHT: 72 IN | TEMPERATURE: 98.8 F | DIASTOLIC BLOOD PRESSURE: 80 MMHG | HEART RATE: 86 BPM | WEIGHT: 208.7 LBS | RESPIRATION RATE: 16 BRPM | SYSTOLIC BLOOD PRESSURE: 134 MMHG | OXYGEN SATURATION: 98 % | BODY MASS INDEX: 28.27 KG/M2

## 2022-09-22 DIAGNOSIS — F43.21 ADJUSTMENT DISORDER WITH DEPRESSED MOOD: ICD-10-CM

## 2022-09-22 DIAGNOSIS — G47.00 INSOMNIA, UNSPECIFIED TYPE: Primary | ICD-10-CM

## 2022-09-22 DIAGNOSIS — Z23 HIGH PRIORITY FOR 2019-NCOV VACCINE: ICD-10-CM

## 2022-09-22 PROCEDURE — 91313 COVID-19,PF,MODERNA BIVALENT: CPT | Performed by: FAMILY MEDICINE

## 2022-09-22 PROCEDURE — 0134A COVID-19,PF,MODERNA BIVALENT: CPT | Performed by: FAMILY MEDICINE

## 2022-09-22 PROCEDURE — 90471 IMMUNIZATION ADMIN: CPT | Performed by: FAMILY MEDICINE

## 2022-09-22 PROCEDURE — 90686 IIV4 VACC NO PRSV 0.5 ML IM: CPT | Performed by: FAMILY MEDICINE

## 2022-09-22 PROCEDURE — 99214 OFFICE O/P EST MOD 30 MIN: CPT | Mod: 25 | Performed by: FAMILY MEDICINE

## 2022-09-22 RX ORDER — BUPROPION HYDROCHLORIDE 150 MG/1
150 TABLET, EXTENDED RELEASE ORAL DAILY
Qty: 90 TABLET | Refills: 1 | Status: SHIPPED | OUTPATIENT
Start: 2022-09-22 | End: 2023-01-12

## 2022-09-22 RX ORDER — ZOLPIDEM TARTRATE 6.25 MG/1
6.25 TABLET, FILM COATED, EXTENDED RELEASE ORAL
Qty: 90 TABLET | Refills: 1 | Status: SHIPPED | OUTPATIENT
Start: 2022-09-22 | End: 2023-01-12

## 2022-09-22 ASSESSMENT — ENCOUNTER SYMPTOMS
DECREASED CONCENTRATION: 1
SLEEP DISTURBANCE: 1

## 2022-09-22 NOTE — PROGRESS NOTES
Assessment & Plan     Insomnia, unspecified type  Continue current medical management  - zolpidem ER (AMBIEN CR) 6.25 MG CR tablet  Dispense: 90 tablet; Refill: 1    Adjustment disorder with depressed mood  Start Wellbutrin, in the past was tolerated well, no contraindications.  - buPROPion (WELLBUTRIN SR) 150 MG 12 hr tablet  Dispense: 90 tablet; Refill: 1    High priority for 2019-nCoV vaccine  - COVID-19,PF,MODERNA BIVALENT 18+Yrs          Return in about 3 months (around 12/22/2022) for mental health followup.    Bubba Powers MD  [facility] is a 47 year [facility], presenting for the following health issues:  Insomnia    History of Present Illness       Reason for visit:  Insomnia    He eats 4 or more servings of fruits and vegetables daily.He consumes 0 sweetened beverage(s) daily.He exercises with enough effort to increase his heart rate 30 to 60 minutes per day.  He exercises with enough effort to increase his heart rate 5 days per week. He is missing 1 dose(s) of medications per week.  He is not taking prescribed medications regularly due to remembering to take.       Patient is a very pleasant 47-year-old male who presents for interval follow-up regarding insomnia.  He is also noticed depressed moods, currently under going PA training, and clinicals.  Feels overwhelmed, fatigue, depressed mood.  Child with autism spectrum disorder, wife is a nurse and who is also very busy.    He finds difficulty with concentration.  He is currently using Ambien to help him sleep, tolerating the medication well without reported side effects.  No daytime grogginess, exercises every morning.      Review of Systems   Psychiatric/Behavioral: Positive for decreased concentration and sleep disturbance. Negative for behavioral problems.            Objective    /80   Pulse 86   Temp 98.8  F (37.1  C) (Oral)   Resp 16   Ht 1.829 m (6')   Wt 94.7 kg (208 lb 11.2 oz)   SpO2 98%    BMI 28.30 kg/m    Body mass index is 28.3 kg/m .  Physical Exam  Cardiovascular:      Rate and Rhythm: Normal rate and regular rhythm.   Pulmonary:      Effort: Pulmonary effort is normal.      Breath sounds: Normal breath sounds.   Psychiatric:      Comments: Patient appears worried, flat affect, depressed mood.  Linear, nonpressured speech, no hallucinations or psychosis.

## 2023-01-12 ENCOUNTER — OFFICE VISIT (OUTPATIENT)
Dept: FAMILY MEDICINE | Facility: CLINIC | Age: 48
End: 2023-01-12
Payer: OTHER GOVERNMENT

## 2023-01-12 VITALS
WEIGHT: 208 LBS | SYSTOLIC BLOOD PRESSURE: 139 MMHG | TEMPERATURE: 98.1 F | HEIGHT: 71 IN | HEART RATE: 69 BPM | OXYGEN SATURATION: 97 % | RESPIRATION RATE: 14 BRPM | BODY MASS INDEX: 29.12 KG/M2 | DIASTOLIC BLOOD PRESSURE: 85 MMHG

## 2023-01-12 DIAGNOSIS — Z00.00 ROUTINE GENERAL MEDICAL EXAMINATION AT A HEALTH CARE FACILITY: Primary | ICD-10-CM

## 2023-01-12 DIAGNOSIS — I10 ESSENTIAL HYPERTENSION: ICD-10-CM

## 2023-01-12 DIAGNOSIS — G47.00 INSOMNIA, UNSPECIFIED TYPE: ICD-10-CM

## 2023-01-12 DIAGNOSIS — E03.9 HYPOTHYROIDISM, UNSPECIFIED TYPE: ICD-10-CM

## 2023-01-12 DIAGNOSIS — K22.70 BARRETT'S ESOPHAGUS WITHOUT DYSPLASIA: ICD-10-CM

## 2023-01-12 DIAGNOSIS — J45.40 MODERATE PERSISTENT ASTHMA, UNSPECIFIED WHETHER COMPLICATED: ICD-10-CM

## 2023-01-12 DIAGNOSIS — F43.21 ADJUSTMENT DISORDER WITH DEPRESSED MOOD: ICD-10-CM

## 2023-01-12 PROCEDURE — 99396 PREV VISIT EST AGE 40-64: CPT | Performed by: FAMILY MEDICINE

## 2023-01-12 PROCEDURE — 99214 OFFICE O/P EST MOD 30 MIN: CPT | Mod: 25 | Performed by: FAMILY MEDICINE

## 2023-01-12 RX ORDER — BUPROPION HYDROCHLORIDE 150 MG/1
150 TABLET, EXTENDED RELEASE ORAL DAILY
Qty: 90 TABLET | Refills: 3 | Status: SHIPPED | OUTPATIENT
Start: 2023-01-12 | End: 2023-05-12

## 2023-01-12 RX ORDER — FLUTICASONE PROPIONATE AND SALMETEROL 500; 50 UG/1; UG/1
1 POWDER RESPIRATORY (INHALATION) EVERY 12 HOURS
Qty: 60 EACH | Refills: 3 | Status: SHIPPED | OUTPATIENT
Start: 2023-01-12 | End: 2023-09-13

## 2023-01-12 RX ORDER — LOSARTAN POTASSIUM 100 MG/1
100 TABLET ORAL DAILY
Qty: 90 TABLET | Refills: 3 | Status: SHIPPED | OUTPATIENT
Start: 2023-01-12 | End: 2023-07-17 | Stop reason: ALTCHOICE

## 2023-01-12 RX ORDER — ALBUTEROL SULFATE 90 UG/1
2 AEROSOL, METERED RESPIRATORY (INHALATION) EVERY 6 HOURS PRN
Qty: 18 G | Refills: 3 | Status: SHIPPED | OUTPATIENT
Start: 2023-01-12 | End: 2024-02-02

## 2023-01-12 RX ORDER — LEVOTHYROXINE SODIUM 175 UG/1
175 TABLET ORAL DAILY
Qty: 90 TABLET | Refills: 3 | Status: SHIPPED | OUTPATIENT
Start: 2023-01-12 | End: 2024-02-02

## 2023-01-12 RX ORDER — ZOLPIDEM TARTRATE 6.25 MG/1
6.25 TABLET, FILM COATED, EXTENDED RELEASE ORAL
Qty: 90 TABLET | Refills: 3 | Status: SHIPPED | OUTPATIENT
Start: 2023-01-12 | End: 2023-07-08

## 2023-01-12 RX ORDER — ESOMEPRAZOLE MAGNESIUM 40 MG/1
40 CAPSULE, DELAYED RELEASE ORAL
Qty: 90 CAPSULE | Refills: 3 | Status: SHIPPED | OUTPATIENT
Start: 2023-01-12 | End: 2024-02-02

## 2023-01-12 SDOH — ECONOMIC STABILITY: FOOD INSECURITY: WITHIN THE PAST 12 MONTHS, THE FOOD YOU BOUGHT JUST DIDN'T LAST AND YOU DIDN'T HAVE MONEY TO GET MORE.: NEVER TRUE

## 2023-01-12 SDOH — HEALTH STABILITY: PHYSICAL HEALTH: ON AVERAGE, HOW MANY MINUTES DO YOU ENGAGE IN EXERCISE AT THIS LEVEL?: 30 MIN

## 2023-01-12 SDOH — ECONOMIC STABILITY: FOOD INSECURITY: WITHIN THE PAST 12 MONTHS, YOU WORRIED THAT YOUR FOOD WOULD RUN OUT BEFORE YOU GOT MONEY TO BUY MORE.: NEVER TRUE

## 2023-01-12 SDOH — ECONOMIC STABILITY: INCOME INSECURITY: HOW HARD IS IT FOR YOU TO PAY FOR THE VERY BASICS LIKE FOOD, HOUSING, MEDICAL CARE, AND HEATING?: NOT HARD AT ALL

## 2023-01-12 SDOH — ECONOMIC STABILITY: TRANSPORTATION INSECURITY
IN THE PAST 12 MONTHS, HAS LACK OF TRANSPORTATION KEPT YOU FROM MEETINGS, WORK, OR FROM GETTING THINGS NEEDED FOR DAILY LIVING?: NO

## 2023-01-12 SDOH — HEALTH STABILITY: PHYSICAL HEALTH: ON AVERAGE, HOW MANY DAYS PER WEEK DO YOU ENGAGE IN MODERATE TO STRENUOUS EXERCISE (LIKE A BRISK WALK)?: 4 DAYS

## 2023-01-12 SDOH — ECONOMIC STABILITY: INCOME INSECURITY: IN THE LAST 12 MONTHS, WAS THERE A TIME WHEN YOU WERE NOT ABLE TO PAY THE MORTGAGE OR RENT ON TIME?: NO

## 2023-01-12 SDOH — ECONOMIC STABILITY: TRANSPORTATION INSECURITY
IN THE PAST 12 MONTHS, HAS THE LACK OF TRANSPORTATION KEPT YOU FROM MEDICAL APPOINTMENTS OR FROM GETTING MEDICATIONS?: NO

## 2023-01-12 ASSESSMENT — SOCIAL DETERMINANTS OF HEALTH (SDOH)
IN A TYPICAL WEEK, HOW MANY TIMES DO YOU TALK ON THE PHONE WITH FAMILY, FRIENDS, OR NEIGHBORS?: MORE THAN THREE TIMES A WEEK
HOW OFTEN DO YOU GET TOGETHER WITH FRIENDS OR RELATIVES?: NEVER
HOW OFTEN DO YOU ATTEND CHURCH OR RELIGIOUS SERVICES?: NEVER
DO YOU BELONG TO ANY CLUBS OR ORGANIZATIONS SUCH AS CHURCH GROUPS UNIONS, FRATERNAL OR ATHLETIC GROUPS, OR SCHOOL GROUPS?: NO

## 2023-01-12 ASSESSMENT — LIFESTYLE VARIABLES
HOW MANY STANDARD DRINKS CONTAINING ALCOHOL DO YOU HAVE ON A TYPICAL DAY: 1 OR 2
SKIP TO QUESTIONS 9-10: 1
HOW OFTEN DO YOU HAVE SIX OR MORE DRINKS ON ONE OCCASION: NEVER
AUDIT-C TOTAL SCORE: 1
HOW OFTEN DO YOU HAVE A DRINK CONTAINING ALCOHOL: MONTHLY OR LESS

## 2023-01-12 ASSESSMENT — ENCOUNTER SYMPTOMS
COUGH: 0
HEMATURIA: 0
FEVER: 0
ARTHRALGIAS: 0
NAUSEA: 0
PARESTHESIAS: 0
WEAKNESS: 0
NERVOUS/ANXIOUS: 0
SHORTNESS OF BREATH: 0
PALPITATIONS: 0
HEMATOCHEZIA: 0
DYSURIA: 0
ABDOMINAL PAIN: 0
HEARTBURN: 0
FREQUENCY: 0
SORE THROAT: 0
DIZZINESS: 0
DIARRHEA: 0
HEADACHES: 0
CHILLS: 0
JOINT SWELLING: 0
MYALGIAS: 0
EYE PAIN: 0
CONSTIPATION: 0

## 2023-01-12 ASSESSMENT — ASTHMA QUESTIONNAIRES
QUESTION_4 LAST FOUR WEEKS HOW OFTEN HAVE YOU USED YOUR RESCUE INHALER OR NEBULIZER MEDICATION (SUCH AS ALBUTEROL): ONCE A WEEK OR LESS
QUESTION_2 LAST FOUR WEEKS HOW OFTEN HAVE YOU HAD SHORTNESS OF BREATH: ONCE OR TWICE A WEEK
QUESTION_3 LAST FOUR WEEKS HOW OFTEN DID YOUR ASTHMA SYMPTOMS (WHEEZING, COUGHING, SHORTNESS OF BREATH, CHEST TIGHTNESS OR PAIN) WAKE YOU UP AT NIGHT OR EARLIER THAN USUAL IN THE MORNING: ONCE OR TWICE
QUESTION_5 LAST FOUR WEEKS HOW WOULD YOU RATE YOUR ASTHMA CONTROL: WELL CONTROLLED
QUESTION_1 LAST FOUR WEEKS HOW MUCH OF THE TIME DID YOUR ASTHMA KEEP YOU FROM GETTING AS MUCH DONE AT WORK, SCHOOL OR AT HOME: NONE OF THE TIME
ACT_TOTALSCORE: 21
ACT_TOTALSCORE: 21

## 2023-01-12 NOTE — PROGRESS NOTES
SUBJECTIVE:   CC: Sunny is an 47 year old who presents for preventative health visit.   Patient has been advised of split billing requirements and indicates understanding: Yes  Healthy Habits:     Getting at least 3 servings of Calcium per day:  Yes    Bi-annual eye exam:  Yes    Dental care twice a year:  Yes    Sleep apnea or symptoms of sleep apnea:  None    Diet:  Low salt    Frequency of exercise:  4-5 days/week    Duration of exercise:  30-45 minutes    Taking medications regularly:  Yes    Medication side effects:  Not applicable    PHQ-2 Total Score: 1    Additional concerns today:  Yes    Interval follow-up regarding asthma, depression, insomnia, hypertension, John's esophagus    Today's PHQ-2 Score:   PHQ-2 (  [employer]) 2023   Q1: Little interest or pleasure in doing things 0   Q2: Feeling down, depressed or hopeless 1   PHQ-2 Score 1   Q1: Little interest or pleasure in doing things Not at all   Q2: Feeling down, depressed or hopeless Several days   PHQ-2 Score 1           Social History     Tobacco Use     Smoking status: Former     Types: Cigarettes     Quit date: 2003     Years since quittin.0     Smokeless tobacco: Former     Quit date: 2010   Substance Use Topics     Alcohol use: Not on file     Comment: 1-2 drinks per week         Alcohol Use 2023   Prescreen: >3 drinks/day or >7 drinks/week? No       Last PSA: No results found for: PSA    Reviewed orders with patient. Reviewed health maintenance and updated orders accordingly - Yes      Reviewed and updated as needed this visit by clinical staff   Tobacco  Allergies  Meds              Reviewed and updated as needed this visit by Provider                     Review of Systems   Constitutional: Negative for chills and fever.   HENT: Negative for congestion, ear pain, hearing loss and sore throat.    Eyes: Negative for pain and visual disturbance.   Respiratory: Negative for cough and shortness of breath.   "  Cardiovascular: Negative for chest pain, palpitations and peripheral edema.   Gastrointestinal: Negative for abdominal pain, constipation, diarrhea, heartburn, hematochezia and nausea.   Genitourinary: Negative for dysuria, frequency, genital sores, hematuria, impotence, penile discharge and urgency.   Musculoskeletal: Negative for arthralgias, joint swelling and myalgias.   Skin: Negative for rash.   Neurological: Negative for dizziness, weakness, headaches and paresthesias.   Psychiatric/Behavioral: Negative for mood changes. The patient is not nervous/anxious.          OBJECTIVE:   /85 (BP Location: Right arm, Patient Position: Sitting, Cuff Size: Adult Large)   Pulse 69   Temp 98.1  F (36.7  C) (Oral)   Resp 14   Ht 1.797 m (5' 10.75\")   Wt 94.3 kg (208 lb)   SpO2 97%   BMI 29.22 kg/m      Physical Exam  Vitals reviewed.   Constitutional:       General: He is not in acute distress.     Appearance: Normal appearance. He is not ill-appearing.   HENT:      Head: Normocephalic and atraumatic.      Right Ear: External ear normal.      Left Ear: External ear normal.      Nose: Nose normal.      Mouth/Throat:      Mouth: Mucous membranes are moist.      Pharynx: Oropharynx is clear.   Eyes:      General: No scleral icterus.        Right eye: No discharge.         Left eye: No discharge.      Extraocular Movements: Extraocular movements intact.      Pupils: Pupils are equal, round, and reactive to light.   Neck:      Vascular: No JVD.   Cardiovascular:      Rate and Rhythm: Normal rate and regular rhythm.   Pulmonary:      Effort: Pulmonary effort is normal. No respiratory distress.      Breath sounds: Normal breath sounds.   Abdominal:      General: Abdomen is flat. Bowel sounds are normal.      Palpations: Abdomen is soft.   Musculoskeletal:         General: No swelling.      Cervical back: Normal range of motion.   Lymphadenopathy:      Cervical: No cervical adenopathy.   Skin:     General: Skin is " warm.      Capillary Refill: Capillary refill takes less than 2 seconds.   Neurological:      General: No focal deficit present.      Mental Status: He is alert.   Psychiatric:         Mood and Affect: Mood normal.         Behavior: Behavior normal.           Diagnostic Test Results:  Labs reviewed in [facility]    Last Comprehensive Metabolic Panel:  Sodium   Date Value Ref Range Status   12/20/2021 138 133 - 144 mmol/L Final     Potassium   Date Value Ref Range Status   12/20/2021 4.2 3.4 - 5.3 mmol/L Final     Chloride   Date Value Ref Range Status   12/20/2021 107 94 - 109 mmol/L Final     Carbon Dioxide (CO2)   Date Value Ref Range Status   12/20/2021 26 20 - 32 mmol/L Final     Anion Gap   Date Value Ref Range Status   12/20/2021 5 3 - 14 mmol/L Final     Glucose   Date Value Ref Range Status   12/20/2021 91 70 - 99 mg/dL Final     Urea Nitrogen   Date Value Ref Range Status   12/20/2021 10 7 - 30 mg/dL Final     Creatinine   Date Value Ref Range Status   12/20/2021 0.81 0.66 - 1.25 mg/dL Final     GFR Estimate   Date Value Ref Range Status   12/20/2021 >90 >60 mL/min/1.73m2 Final     Comment:     As of July 11, 2021, eGFR is calculated by the CKD-EPI creatinine equation, without race adjustment. eGFR can be influenced by muscle mass, exercise, and diet. The reported eGFR is an estimation only and is only applicable if the renal function is stable.     Calcium   Date Value Ref Range Status   12/20/2021 9.0 8.5 - 10.1 mg/dL Final         ASSESSMENT/PLAN:   Sunny was seen today for physical.    Diagnoses and all orders for this visit:    Routine general medical examination at a health care facility    Hypothyroidism, unspecified type  -     levothyroxine (SYNTHROID/LEVOTHROID) 175 MCG tablet; Take 1 tablet (175 mcg) by mouth daily    Essential hypertension  -     losartan (COZAAR) 100 MG tablet; Take 1 tablet (100 mg) by mouth daily    Insomnia, unspecified type  -     zolpidem ER (AMBIEN CR) 6.25 MG CR tablet; Take 1  "tablet (6.25 mg) by mouth nightly as needed for sleep    Adjustment disorder with depressed mood  -     buPROPion (WELLBUTRIN SR) 150 MG 12 hr tablet; Take 1 tablet (150 mg) by mouth daily    Moderate persistent asthma, unspecified whether complicated  -     fluticasone-salmeterol (ADVAIR) 500-50 MCG/ACT inhaler; Inhale 1 puff into the lungs every 12 hours  -     albuterol (PROAIR HFA/PROVENTIL HFA/VENTOLIN HFA) 108 (90 Base) MCG/ACT inhaler; Inhale 2 puffs into the lungs every 6 hours as needed for shortness of breath, wheezing or cough    John's esophagus without dysplasia  -     esomeprazole (NEXIUM) 40 MG DR capsule; Take 1 capsule (40 mg) by mouth every morning (before breakfast)              COUNSELING:   Reviewed preventive health counseling, as reflected in patient instructions       Regular exercise       Healthy diet/nutrition      BMI:   Estimated body mass index is 29.22 kg/m  as calculated from the following:    Height as of this encounter: 1.797 m (5' 10.75\").    Weight as of this encounter: 94.3 kg (208 lb).   Weight management plan: Discussed healthy diet and exercise guidelines      He reports that he quit smoking about 19 years ago. He quit smokeless tobacco use about 12 years ago.            Bubba Powers MD  [facility]  "

## 2023-04-20 ENCOUNTER — OFFICE VISIT (OUTPATIENT)
Dept: URGENT CARE | Facility: URGENT CARE | Age: 48
End: 2023-04-20
Payer: OTHER GOVERNMENT

## 2023-04-20 VITALS
WEIGHT: 200 LBS | HEART RATE: 69 BPM | BODY MASS INDEX: 28.09 KG/M2 | DIASTOLIC BLOOD PRESSURE: 81 MMHG | OXYGEN SATURATION: 98 % | SYSTOLIC BLOOD PRESSURE: 144 MMHG | TEMPERATURE: 98.3 F

## 2023-04-20 DIAGNOSIS — R07.0 THROAT PAIN: Primary | ICD-10-CM

## 2023-04-20 DIAGNOSIS — J40 BRONCHITIS: ICD-10-CM

## 2023-04-20 DIAGNOSIS — J45.31 MILD PERSISTENT ASTHMA WITH EXACERBATION: ICD-10-CM

## 2023-04-20 LAB
DEPRECATED S PYO AG THROAT QL EIA: NEGATIVE
GROUP A STREP BY PCR: NOT DETECTED

## 2023-04-20 PROCEDURE — U0003 INFECTIOUS AGENT DETECTION BY NUCLEIC ACID (DNA OR RNA); SEVERE ACUTE RESPIRATORY SYNDROME CORONAVIRUS 2 (SARS-COV-2) (CORONAVIRUS DISEASE [COVID-19]), AMPLIFIED PROBE TECHNIQUE, MAKING USE OF HIGH THROUGHPUT TECHNOLOGIES AS DESCRIBED BY CMS-2020-01-R: HCPCS | Performed by: NURSE PRACTITIONER

## 2023-04-20 PROCEDURE — 87651 STREP A DNA AMP PROBE: CPT | Performed by: NURSE PRACTITIONER

## 2023-04-20 PROCEDURE — U0005 INFEC AGEN DETEC AMPLI PROBE: HCPCS | Performed by: NURSE PRACTITIONER

## 2023-04-20 PROCEDURE — 99214 OFFICE O/P EST MOD 30 MIN: CPT | Mod: CS | Performed by: NURSE PRACTITIONER

## 2023-04-20 RX ORDER — AZITHROMYCIN 250 MG/1
TABLET, FILM COATED ORAL
Qty: 6 TABLET | Refills: 0 | Status: SHIPPED | OUTPATIENT
Start: 2023-04-20 | End: 2023-04-25

## 2023-04-20 RX ORDER — PREDNISONE 20 MG/1
40 TABLET ORAL DAILY
Qty: 10 TABLET | Refills: 0 | Status: SHIPPED | OUTPATIENT
Start: 2023-04-20 | End: 2023-04-25

## 2023-04-20 NOTE — PROGRESS NOTES
Chief Complaint   Patient presents with     Urgent Care     X7 Days throat pain, fever 101, body aches, fatigued, cough, sob, chest tightness, headache, ear pain bilateral, asthma is worse     Taking inhaler, motrin, tylenol,      SUBJECTIVE:  Sunny Hayes is a 48 year old male presenting with sore throat fever myalgias fatigue shortness of breath tightness headache earache asthma flare for over a week worsening.  He has been using his albuterol inhaler every couple hours.  Takes Advair for daily maintenance.    Past Medical History:   Diagnosis Date     Gastroesophageal reflux disease      Hypertension      Hypothyroidism      Uncomplicated asthma      albuterol (PROAIR HFA/PROVENTIL HFA/VENTOLIN HFA) 108 (90 Base) MCG/ACT inhaler, Inhale 2 puffs into the lungs every 6 hours as needed for shortness of breath, wheezing or cough  buPROPion (WELLBUTRIN SR) 150 MG 12 hr tablet, Take 1 tablet (150 mg) by mouth daily  esomeprazole (NEXIUM) 40 MG DR capsule, Take 1 capsule (40 mg) by mouth every morning (before breakfast)  famotidine (PEPCID) 20 MG tablet,   fluticasone-salmeterol (ADVAIR) 500-50 MCG/ACT inhaler, Inhale 1 puff into the lungs every 12 hours  levothyroxine (SYNTHROID/LEVOTHROID) 175 MCG tablet, Take 1 tablet (175 mcg) by mouth daily  losartan (COZAAR) 100 MG tablet, Take 1 tablet (100 mg) by mouth daily  zolpidem ER (AMBIEN CR) 6.25 MG CR tablet, Take 1 tablet (6.25 mg) by mouth nightly as needed for sleep  diclofenac (VOLTAREN) 75 MG EC tablet, Take 1 tablet (75 mg) by mouth 2 times daily for 14 days    2 mL bupivacaine (MARCAINE) preservative free injection 0.5% (20 mL vial)  triamcinolone (KENALOG-40) injection 40 mg      Social History     Tobacco Use     Smoking status: Former     Types: Cigarettes     Quit date: 2003     Years since quittin.3     Smokeless tobacco: Former     Quit date: 2010   Vaping Use     Vaping status: Never Used   Substance Use Topics     Alcohol use: Not on  file     Comment: 1-2 drinks per week     Allergies   Allergen Reactions     Ace Inhibitors Unknown     Bicillin C-R, Unknown     Penicillins Difficulty breathing, Hives, Itching and Swelling     rash         Review of Systems   All systems negative except for those listed above in HPI.    OBJECTIVE:   BP (!) 144/81   Pulse 69   Temp 98.3  F (36.8  C) (Oral)   Wt 90.7 kg (200 lb)   SpO2 98%   BMI 28.09 kg/m       Physical Exam  Vitals reviewed.   Constitutional:       General: He is not in acute distress.     Appearance: Normal appearance. He is ill-appearing. He is not toxic-appearing or diaphoretic.   HENT:      Head: Normocephalic and atraumatic.      Right Ear: Tympanic membrane and ear canal normal.      Left Ear: Tympanic membrane and ear canal normal.      Ears:      Comments: Bilateral serous effusions of clear bulging fluid.     Nose: Congestion and rhinorrhea present.      Mouth/Throat:      Mouth: Mucous membranes are moist.      Pharynx: Oropharynx is clear. No oropharyngeal exudate or posterior oropharyngeal erythema.   Eyes:      Extraocular Movements: Extraocular movements intact.      Conjunctiva/sclera: Conjunctivae normal.      Pupils: Pupils are equal, round, and reactive to light.   Cardiovascular:      Rate and Rhythm: Normal rate.      Pulses: Normal pulses.   Pulmonary:      Effort: Respiratory distress present.      Breath sounds: No stridor. No wheezing, rhonchi or rales.   Chest:      Chest wall: No tenderness.   Musculoskeletal:      Cervical back: Normal range of motion.   Lymphadenopathy:      Cervical: No cervical adenopathy.   Skin:     General: Skin is warm and dry.   Neurological:      General: No focal deficit present.      Mental Status: He is alert and oriented to person, place, and time.   Psychiatric:         Mood and Affect: Mood normal.         Behavior: Behavior normal.       Results for orders placed or performed in visit on 04/20/23   Streptococcus A Rapid Screen  w/Reflex to PCR - Clinic Collect     Status: Normal    Specimen: Throat; Swab   Result Value Ref Range    Group A Strep antigen Negative Negative     ASSESSMENT:    ICD-10-CM    1. Throat pain  R07.0 Symptomatic COVID-19 Virus (Coronavirus) by PCR Nose     Streptococcus A Rapid Screen w/Reflex to PCR - Clinic Collect     Symptomatic COVID-19 Virus (Coronavirus) by PCR Nose     Group A Streptococcus PCR Throat Swab      2. Bronchitis  J40 azithromycin (ZITHROMAX) 250 MG tablet      3. Mild persistent asthma with exacerbation  J45.31 predniSONE (DELTASONE) 20 MG tablet        PLAN:     Offered chest x-ray and CBC to patient today  He declines and prefers empiric treatment given worsening after a week  Z-Conrad and prednisone for bronchitis and asthma flare  Rest! Your body needs more rest to heal.  Drink plenty of fluids (warm fluids like tea or soup are soothing and reduce cough)  Sit in the bathroom with a hot shower running and breathe in the steam.  Honey may soothe your sore throat and help manage your cough- may take straight or in warm water with lemon juice.  Avoid smoke (cigarettes, bonfires, fireplace, wood burning stoves).  Take Tylenol or an NSAID such as ibuprofen or naproxen as needed for pain.  Delsym (dextromethorphan polistirex) is an over the counter cough medication that lasts 12 hours.   Mucinex or Robitussin (guiafenesin) thin mucus and may help it to loosen more quickly  Good handwashing is the best way to prevent spread of germs  Present to emergency room if you develop trouble breathing, swallowing or cough-up blood, chest pain.  Follow up with your primary care provider if symptoms worsen or fail to improve as expected.    Follow up with primary care provider with any problems, questions or concerns or if symptoms worsen or fail to improve. Patient agreed to plan and verbalized understanding.    Capri Angelo, [facility]

## 2023-04-20 NOTE — PATIENT INSTRUCTIONS
Offered chest x-ray and CBC to patient today  He declines and prefers empiric treatment given worsening after a week  Z-Conrad and prednisone for bronchitis and asthma flare  Rest! Your body needs more rest to heal.  Drink plenty of fluids (warm fluids like tea or soup are soothing and reduce cough)  Sit in the bathroom with a hot shower running and breathe in the steam.  Honey may soothe your sore throat and help manage your cough- may take straight or in warm water with lemon juice.  Avoid smoke (cigarettes, bonfires, fireplace, wood burning stoves).  Take Tylenol or an NSAID such as ibuprofen or naproxen as needed for pain.  Delsym (dextromethorphan polistirex) is an over the counter cough medication that lasts 12 hours.   Mucinex or Robitussin (guiafenesin) thin mucus and may help it to loosen more quickly  Good handwashing is the best way to prevent spread of germs  Present to emergency room if you develop trouble breathing, swallowing or cough-up blood, chest pain.  Follow up with your primary care provider if symptoms worsen or fail to improve as expected.

## 2023-04-21 LAB — SARS-COV-2 RNA RESP QL NAA+PROBE: NEGATIVE

## 2023-05-12 ENCOUNTER — MYC MEDICAL ADVICE (OUTPATIENT)
Dept: FAMILY MEDICINE | Facility: CLINIC | Age: 48
End: 2023-05-12
Payer: OTHER GOVERNMENT

## 2023-05-12 DIAGNOSIS — F43.21 ADJUSTMENT DISORDER WITH DEPRESSED MOOD: ICD-10-CM

## 2023-05-12 RX ORDER — BUPROPION HYDROCHLORIDE 150 MG/1
150 TABLET, EXTENDED RELEASE ORAL 2 TIMES DAILY
Qty: 180 TABLET | Refills: 2 | Status: CANCELLED | OUTPATIENT
Start: 2023-05-12

## 2023-05-12 RX ORDER — BUPROPION HYDROCHLORIDE 150 MG/1
150 TABLET, EXTENDED RELEASE ORAL 2 TIMES DAILY
Qty: 180 TABLET | Refills: 0 | Status: SHIPPED | OUTPATIENT
Start: 2023-05-12 | End: 2023-07-08

## 2023-05-12 ASSESSMENT — ANXIETY QUESTIONNAIRES
GAD7 TOTAL SCORE: 2
4. TROUBLE RELAXING: SEVERAL DAYS
6. BECOMING EASILY ANNOYED OR IRRITABLE: NOT AT ALL
3. WORRYING TOO MUCH ABOUT DIFFERENT THINGS: SEVERAL DAYS
2. NOT BEING ABLE TO STOP OR CONTROL WORRYING: NOT AT ALL
7. FEELING AFRAID AS IF SOMETHING AWFUL MIGHT HAPPEN: NOT AT ALL
IF YOU CHECKED OFF ANY PROBLEMS ON THIS QUESTIONNAIRE, HOW DIFFICULT HAVE THESE PROBLEMS MADE IT FOR YOU TO DO YOUR WORK, TAKE CARE OF THINGS AT HOME, OR GET ALONG WITH OTHER PEOPLE: SOMEWHAT DIFFICULT
5. BEING SO RESTLESS THAT IT IS HARD TO SIT STILL: NOT AT ALL
8. IF YOU CHECKED OFF ANY PROBLEMS, HOW DIFFICULT HAVE THESE MADE IT FOR YOU TO DO YOUR WORK, TAKE CARE OF THINGS AT HOME, OR GET ALONG WITH OTHER PEOPLE?: SOMEWHAT DIFFICULT
GAD7 TOTAL SCORE: 2
1. FEELING NERVOUS, ANXIOUS, OR ON EDGE: NOT AT ALL
7. FEELING AFRAID AS IF SOMETHING AWFUL MIGHT HAPPEN: NOT AT ALL

## 2023-05-12 ASSESSMENT — PATIENT HEALTH QUESTIONNAIRE - PHQ9
10. IF YOU CHECKED OFF ANY PROBLEMS, HOW DIFFICULT HAVE THESE PROBLEMS MADE IT FOR YOU TO DO YOUR WORK, TAKE CARE OF THINGS AT HOME, OR GET ALONG WITH OTHER PEOPLE: SOMEWHAT DIFFICULT
SUM OF ALL RESPONSES TO PHQ QUESTIONS 1-9: 8
SUM OF ALL RESPONSES TO PHQ QUESTIONS 1-9: 8

## 2023-05-12 NOTE — TELEPHONE ENCOUNTER
Please advise on increased dose. Pt was last seen in January. Unable to find where it was discussed to increase to BID dosing.    Dong DOYLE RN, BSN

## 2023-06-25 ENCOUNTER — MYC MEDICAL ADVICE (OUTPATIENT)
Dept: FAMILY MEDICINE | Facility: CLINIC | Age: 48
End: 2023-06-25
Payer: OTHER GOVERNMENT

## 2023-06-26 ENCOUNTER — VIRTUAL VISIT (OUTPATIENT)
Dept: PEDIATRICS | Facility: CLINIC | Age: 48
End: 2023-06-26
Payer: OTHER GOVERNMENT

## 2023-06-26 DIAGNOSIS — I10 ESSENTIAL HYPERTENSION: Primary | ICD-10-CM

## 2023-06-26 PROCEDURE — 99214 OFFICE O/P EST MOD 30 MIN: CPT | Mod: 95 | Performed by: INTERNAL MEDICINE

## 2023-06-26 RX ORDER — CHLORTHALIDONE 25 MG/1
12.5 TABLET ORAL DAILY
Qty: 45 TABLET | Refills: 0 | Status: SHIPPED | OUTPATIENT
Start: 2023-06-26 | End: 2023-09-11

## 2023-06-26 ASSESSMENT — ASTHMA QUESTIONNAIRES: ACT_TOTALSCORE: 25

## 2023-06-26 NOTE — PATIENT INSTRUCTIONS
Nice to meet you!    My team will call to get you set up for nonfasting labs in the next 1-2 days.     Then can start chlorthalidone - we'll start with half/pill daily. Continue your losartan.    Repeat nonfasting labs and BP check in clinic in about 2 weeks. We can adjust your dose then if needed.     August is good timing to follow up with your PCP about the other things and by then we'll have your BP under better control.     Please let me know if you have questions.

## 2023-06-26 NOTE — PROGRESS NOTES
Sunny is a 48 year old who is being evaluated via a billable video visit.     Assessment & Plan       ICD-10-CM    1. Essential hypertension  I10 chlorthalidone (HYGROTON) 25 MG tablet     TSH with free T4 reflex     CBC with platelets     Basic metabolic panel  (Ca, Cl, CO2, Creat, Gluc, K, Na, BUN)     Albumin Random Urine Quantitative with Creat Ratio     Basic metabolic panel  (Ca, Cl, CO2, Creat, Gluc, K, Na, BUN)     UA Macroscopic with reflex to Microscopic and Culture        Noticed BP increase about 2 months after starting Wellbutrin - less likely to be med associated given timeline however also doing really well [facility] so does not want to change.     Will add chlorthalidone - has some LE edema at the end of the day. No longer driving. Does urinate 4x overnight but suspect this may be some BPH and will defer to a different OV.    --------------------------  PATIENT INSTRUCTIONS    Patient Instructions   Nice to meet you!    My team will call to get you set up for nonfasting labs in the next 1-2 days.     Then can start chlorthalidone - we'll start with half/pill daily. Continue your losartan.    Repeat nonfasting labs and BP check in clinic in about 2 weeks. We can adjust your dose then if needed.     August is good timing to follow up with your PCP about the other things and by then we'll have your BP under better control.     Please let me know if you have questions.      --------------------------                     Tristen Ramirez MD  [facility]    Toby Correa is a 48 year old, presenting for the following health issues:  No chief complaint on file.         No data to display              History of Present Illness       Hypertension: He presents for follow up of hypertension.  He does check blood pressure  regularly outside of the clinic. Outside blood pressures have been over 140/90. He follows a low salt diet.     He eats 4 or more servings of fruits and  vegetables daily.He consumes 0 sweetened beverage(s) daily.He exercises with enough effort to increase his heart rate 20 to 29 minutes per day.  He exercises with enough effort to increase his heart rate 4 days per week.   He is taking medications regularly.       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    # Mental health  - wellbutrin  - helped immensely   - was really depressed, couldn't concentrate      8/11/2022     4:41 PM 5/12/2023     8:33 AM   PHQ   PHQ-9 Total Score 10 8   Q9: Thoughts of better off dead/self-harm past 2 weeks Not at all Not at all     # Barretts esophagus  - lifelong PPI    # hypothyroidism  TSH   Date Value Ref Range Status   08/11/2022 0.54 0.40 - 4.00 mU/L Final     # Beta Thalassemia  - usually around 10     # [place]     # Asthma      8/11/2022     4:54 PM 1/11/2023     5:40 PM 1/12/2023     5:07 PM   ACT Total Scores   ACT TOTAL SCORE (Goal Greater than or Equal to 20) 14 22 21   In the past 12 months, how many times did you visit the emergency room for your asthma without being admitted to the hospital? 0 0 0   In the past 12 months, how many times were you hospitalized overnight because of your asthma? 0 0 0       - lives in [place], works in [employer].    Review of Systems         Objective    Vitals - Patient Reported  Systolic (Patient Reported): (!) 160  Diastolic (Patient Reported): (!) 100  Blood pressure taken with manual cuff (will exclude from quality measure): Yes      Vitals:  No vitals were obtained today due to virtual visit.    Physical Exam   GENERAL: Healthy, alert and no distress  EYES: Eyes grossly normal to inspection.  No discharge or erythema, or  obvious scleral/conjunctival abnormalities.  RESP: No audible wheeze, cough, or visible cyanosis.  No visible retractions or increased work of breathing.    SKIN: Visible skin clear. No significant rash, abnormal pigmentation or lesions.  NEURO: Cranial nerves grossly intact.  Mentation and speech appropriate for age.  PSYCH: Mentation appears normal, affect normal/bright, judgement and insight intact, normal speech and appearance well-groomed.    Video-Visit Details    Type of service:  Video Visit     Originating Location (pt. Location): Home    Distant Location (provider location):  On-site  Platform used for Video Visit: Anna

## 2023-06-27 ENCOUNTER — LAB (OUTPATIENT)
Dept: LAB | Facility: CLINIC | Age: 48
End: 2023-06-27
Payer: OTHER GOVERNMENT

## 2023-06-27 DIAGNOSIS — I10 ESSENTIAL HYPERTENSION: ICD-10-CM

## 2023-06-27 LAB
ALBUMIN UR-MCNC: NEGATIVE MG/DL
APPEARANCE UR: CLEAR
BILIRUB UR QL STRIP: NEGATIVE
COLOR UR AUTO: YELLOW
ERYTHROCYTE [DISTWIDTH] IN BLOOD BY AUTOMATED COUNT: 17.5 % (ref 10–15)
GLUCOSE UR STRIP-MCNC: NEGATIVE MG/DL
HCT VFR BLD AUTO: 37.3 % (ref 40–53)
HGB BLD-MCNC: 11.8 G/DL (ref 13.3–17.7)
HGB UR QL STRIP: NEGATIVE
KETONES UR STRIP-MCNC: NEGATIVE MG/DL
LEUKOCYTE ESTERASE UR QL STRIP: NEGATIVE
MCH RBC QN AUTO: 20.4 PG (ref 26.5–33)
MCHC RBC AUTO-ENTMCNC: 31.6 G/DL (ref 31.5–36.5)
MCV RBC AUTO: 65 FL (ref 78–100)
NITRATE UR QL: NEGATIVE
PH UR STRIP: 7 [PH] (ref 5–7)
PLATELET # BLD AUTO: 203 10E3/UL (ref 150–450)
RBC # BLD AUTO: 5.78 10E6/UL (ref 4.4–5.9)
SP GR UR STRIP: <=1.005 (ref 1–1.03)
UROBILINOGEN UR STRIP-ACNC: 0.2 E.U./DL
WBC # BLD AUTO: 5.7 10E3/UL (ref 4–11)

## 2023-06-27 PROCEDURE — 81003 URINALYSIS AUTO W/O SCOPE: CPT

## 2023-06-27 PROCEDURE — 85027 COMPLETE CBC AUTOMATED: CPT

## 2023-06-27 PROCEDURE — 80048 BASIC METABOLIC PNL TOTAL CA: CPT

## 2023-06-27 PROCEDURE — 84443 ASSAY THYROID STIM HORMONE: CPT

## 2023-06-27 PROCEDURE — 36415 COLL VENOUS BLD VENIPUNCTURE: CPT

## 2023-06-28 LAB
ANION GAP SERPL CALCULATED.3IONS-SCNC: 12 MMOL/L (ref 7–15)
BUN SERPL-MCNC: 10 MG/DL (ref 6–20)
CALCIUM SERPL-MCNC: 9.2 MG/DL (ref 8.6–10)
CHLORIDE SERPL-SCNC: 104 MMOL/L (ref 98–107)
CREAT SERPL-MCNC: 1 MG/DL (ref 0.67–1.17)
DEPRECATED HCO3 PLAS-SCNC: 25 MMOL/L (ref 22–29)
GFR SERPL CREATININE-BSD FRML MDRD: >90 ML/MIN/1.73M2
GLUCOSE SERPL-MCNC: 93 MG/DL (ref 70–99)
POTASSIUM SERPL-SCNC: 3.9 MMOL/L (ref 3.4–5.3)
SODIUM SERPL-SCNC: 141 MMOL/L (ref 136–145)
TSH SERPL DL<=0.005 MIU/L-ACNC: 1.13 UIU/ML (ref 0.3–4.2)

## 2023-07-08 ENCOUNTER — HOSPITAL ENCOUNTER (OUTPATIENT)
Facility: CLINIC | Age: 48
Setting detail: OBSERVATION
Discharge: HOME | End: 2023-07-09
Attending: EMERGENCY MEDICINE | Admitting: INTERNAL MEDICINE
Payer: OTHER GOVERNMENT

## 2023-07-08 ENCOUNTER — APPOINTMENT (OUTPATIENT)
Dept: GENERAL RADIOLOGY | Facility: CLINIC | Age: 48
End: 2023-07-08
Attending: EMERGENCY MEDICINE
Payer: OTHER GOVERNMENT

## 2023-07-08 DIAGNOSIS — R07.9 CHEST PAIN, UNSPECIFIED TYPE: Primary | ICD-10-CM

## 2023-07-08 DIAGNOSIS — I10 ESSENTIAL HYPERTENSION: ICD-10-CM

## 2023-07-08 LAB
ANION GAP SERPL CALCULATED.3IONS-SCNC: 10 MMOL/L (ref 7–15)
BASOPHILS # BLD AUTO: 0 10E3/UL (ref 0–0.2)
BASOPHILS NFR BLD AUTO: 1 %
BUN SERPL-MCNC: 10.9 MG/DL (ref 6–20)
CALCIUM SERPL-MCNC: 9.6 MG/DL (ref 8.6–10)
CHLORIDE SERPL-SCNC: 101 MMOL/L (ref 98–107)
CHOLEST SERPL-MCNC: 185 MG/DL
CREAT SERPL-MCNC: 0.85 MG/DL (ref 0.67–1.17)
DEPRECATED HCO3 PLAS-SCNC: 27 MMOL/L (ref 22–29)
EOSINOPHIL # BLD AUTO: 0 10E3/UL (ref 0–0.7)
EOSINOPHIL NFR BLD AUTO: 1 %
ERYTHROCYTE [DISTWIDTH] IN BLOOD BY AUTOMATED COUNT: 16.5 % (ref 10–15)
FERRITIN SERPL-MCNC: 39 NG/ML (ref 31–409)
GFR SERPL CREATININE-BSD FRML MDRD: >90 ML/MIN/1.73M2
GLUCOSE SERPL-MCNC: 110 MG/DL (ref 70–99)
HBA1C MFR BLD: 5 %
HCT VFR BLD AUTO: 36.5 % (ref 40–53)
HDLC SERPL-MCNC: 50 MG/DL
HGB BLD-MCNC: 11.4 G/DL (ref 13.3–17.7)
HOLD SPECIMEN: NORMAL
HOLD SPECIMEN: NORMAL
IMM GRANULOCYTES # BLD: 0 10E3/UL
IMM GRANULOCYTES NFR BLD: 0 %
IRON BINDING CAPACITY (ROCHE): 319 UG/DL (ref 240–430)
IRON SATN MFR SERPL: 24 % (ref 15–46)
IRON SERPL-MCNC: 75 UG/DL (ref 61–157)
LDLC SERPL CALC-MCNC: 99 MG/DL
LYMPHOCYTES # BLD AUTO: 2 10E3/UL (ref 0.8–5.3)
LYMPHOCYTES NFR BLD AUTO: 23 %
MCH RBC QN AUTO: 20.3 PG (ref 26.5–33)
MCHC RBC AUTO-ENTMCNC: 31.2 G/DL (ref 31.5–36.5)
MCV RBC AUTO: 65 FL (ref 78–100)
MONOCYTES # BLD AUTO: 0.6 10E3/UL (ref 0–1.3)
MONOCYTES NFR BLD AUTO: 7 %
NEUTROPHILS # BLD AUTO: 5.9 10E3/UL (ref 1.6–8.3)
NEUTROPHILS NFR BLD AUTO: 68 %
NONHDLC SERPL-MCNC: 135 MG/DL
NRBC # BLD AUTO: 0 10E3/UL
NRBC BLD AUTO-RTO: 0 /100
PLATELET # BLD AUTO: 185 10E3/UL (ref 150–450)
POTASSIUM SERPL-SCNC: 3.5 MMOL/L (ref 3.4–5.3)
RBC # BLD AUTO: 5.61 10E6/UL (ref 4.4–5.9)
SODIUM SERPL-SCNC: 138 MMOL/L (ref 136–145)
TRIGL SERPL-MCNC: 180 MG/DL
TROPONIN T SERPL HS-MCNC: 7 NG/L
TROPONIN T SERPL HS-MCNC: 7 NG/L
WBC # BLD AUTO: 8.6 10E3/UL (ref 4–11)

## 2023-07-08 PROCEDURE — 99285 EMERGENCY DEPT VISIT HI MDM: CPT | Mod: 25

## 2023-07-08 PROCEDURE — G0378 HOSPITAL OBSERVATION PER HR: HCPCS

## 2023-07-08 PROCEDURE — 80061 LIPID PANEL: CPT | Performed by: INTERNAL MEDICINE

## 2023-07-08 PROCEDURE — 85025 COMPLETE CBC W/AUTO DIFF WBC: CPT | Performed by: EMERGENCY MEDICINE

## 2023-07-08 PROCEDURE — 84484 ASSAY OF TROPONIN QUANT: CPT | Mod: 91 | Performed by: EMERGENCY MEDICINE

## 2023-07-08 PROCEDURE — 36415 COLL VENOUS BLD VENIPUNCTURE: CPT | Performed by: EMERGENCY MEDICINE

## 2023-07-08 PROCEDURE — 93005 ELECTROCARDIOGRAM TRACING: CPT

## 2023-07-08 PROCEDURE — 99222 1ST HOSP IP/OBS MODERATE 55: CPT | Performed by: INTERNAL MEDICINE

## 2023-07-08 PROCEDURE — 83550 IRON BINDING TEST: CPT | Performed by: INTERNAL MEDICINE

## 2023-07-08 PROCEDURE — 250N000013 HC RX MED GY IP 250 OP 250 PS 637: Performed by: INTERNAL MEDICINE

## 2023-07-08 PROCEDURE — 84484 ASSAY OF TROPONIN QUANT: CPT | Performed by: EMERGENCY MEDICINE

## 2023-07-08 PROCEDURE — 80048 BASIC METABOLIC PNL TOTAL CA: CPT | Performed by: EMERGENCY MEDICINE

## 2023-07-08 PROCEDURE — 250N000013 HC RX MED GY IP 250 OP 250 PS 637: Performed by: EMERGENCY MEDICINE

## 2023-07-08 PROCEDURE — 71046 X-RAY EXAM CHEST 2 VIEWS: CPT

## 2023-07-08 PROCEDURE — 83036 HEMOGLOBIN GLYCOSYLATED A1C: CPT | Performed by: INTERNAL MEDICINE

## 2023-07-08 PROCEDURE — 82728 ASSAY OF FERRITIN: CPT | Performed by: INTERNAL MEDICINE

## 2023-07-08 RX ORDER — ZOLPIDEM TARTRATE 6.25 MG/1
6.25 TABLET, FILM COATED, EXTENDED RELEASE ORAL AT BEDTIME
COMMUNITY
End: 2023-09-11

## 2023-07-08 RX ORDER — ACETAMINOPHEN 325 MG/1
650 TABLET ORAL EVERY 6 HOURS PRN
Status: DISCONTINUED | OUTPATIENT
Start: 2023-07-08 | End: 2023-07-09 | Stop reason: HOSPADM

## 2023-07-08 RX ORDER — ASPIRIN 325 MG
325 TABLET ORAL ONCE
Status: COMPLETED | OUTPATIENT
Start: 2023-07-08 | End: 2023-07-08

## 2023-07-08 RX ORDER — MORPHINE SULFATE 2 MG/ML
2 INJECTION, SOLUTION INTRAMUSCULAR; INTRAVENOUS EVERY 4 HOURS PRN
Status: DISCONTINUED | OUTPATIENT
Start: 2023-07-08 | End: 2023-07-09 | Stop reason: HOSPADM

## 2023-07-08 RX ORDER — ATORVASTATIN CALCIUM 40 MG/1
80 TABLET, FILM COATED ORAL EVERY EVENING
Status: DISCONTINUED | OUTPATIENT
Start: 2023-07-08 | End: 2023-07-09 | Stop reason: HOSPADM

## 2023-07-08 RX ORDER — ACETAMINOPHEN 650 MG/1
650 SUPPOSITORY RECTAL EVERY 6 HOURS PRN
Status: DISCONTINUED | OUTPATIENT
Start: 2023-07-08 | End: 2023-07-09 | Stop reason: HOSPADM

## 2023-07-08 RX ORDER — BUPROPION HYDROCHLORIDE 150 MG/1
150 TABLET, EXTENDED RELEASE ORAL DAILY
COMMUNITY
End: 2023-09-11

## 2023-07-08 RX ORDER — HYDRALAZINE HYDROCHLORIDE 20 MG/ML
10 INJECTION INTRAMUSCULAR; INTRAVENOUS EVERY 4 HOURS PRN
Status: DISCONTINUED | OUTPATIENT
Start: 2023-07-08 | End: 2023-07-09 | Stop reason: HOSPADM

## 2023-07-08 RX ORDER — ONDANSETRON 2 MG/ML
4 INJECTION INTRAMUSCULAR; INTRAVENOUS EVERY 6 HOURS PRN
Status: DISCONTINUED | OUTPATIENT
Start: 2023-07-08 | End: 2023-07-09 | Stop reason: HOSPADM

## 2023-07-08 RX ORDER — ASPIRIN 81 MG/1
81 TABLET, CHEWABLE ORAL DAILY
Status: DISCONTINUED | OUTPATIENT
Start: 2023-07-09 | End: 2023-07-09 | Stop reason: HOSPADM

## 2023-07-08 RX ORDER — ONDANSETRON 4 MG/1
4 TABLET, ORALLY DISINTEGRATING ORAL EVERY 6 HOURS PRN
Status: DISCONTINUED | OUTPATIENT
Start: 2023-07-08 | End: 2023-07-09 | Stop reason: HOSPADM

## 2023-07-08 RX ADMIN — ASPIRIN 325 MG ORAL TABLET 325 MG: 325 PILL ORAL at 16:54

## 2023-07-08 RX ADMIN — ACETAMINOPHEN 650 MG: 325 TABLET, FILM COATED ORAL at 18:57

## 2023-07-08 RX ADMIN — ATORVASTATIN CALCIUM 80 MG: 40 TABLET, FILM COATED ORAL at 20:15

## 2023-07-08 ASSESSMENT — ACTIVITIES OF DAILY LIVING (ADL)
ADLS_ACUITY_SCORE: 35

## 2023-07-08 ASSESSMENT — ENCOUNTER SYMPTOMS
SHORTNESS OF BREATH: 0
NUMBNESS: 0
WEAKNESS: 0
COUGH: 0
ABDOMINAL PAIN: 0
FATIGUE: 1
LIGHT-HEADEDNESS: 1
HEMATURIA: 0
HEADACHES: 0
DYSURIA: 0
PALPITATIONS: 0
RHINORRHEA: 0
BLOOD IN STOOL: 0

## 2023-07-08 NOTE — PHARMACY-ADMISSION MEDICATION HISTORY
Pharmacist Admission Medication History    Admission medication history is complete. The information provided in this note is only as accurate as the sources available at the time of the update.    Medication reconciliation/reorder completed by provider prior to medication history? No    Information Source(s): Patient and CareEverywhere/SureScripts via in-person    Pertinent Information: patient used to take Weillbutrin SR twice daily but is taking only daily now.    Changes made to PTA medication list:    Added: none    Deleted: famotidine    Changed: Wellbutrin SR BID -> daily, Ambien CR at bedtime PRN -> QHS    Medication Affordability:  Not including over the counter (OTC) medications, was there a time in the past 3 months when you did not take your medications as prescribed because of cost?: No    Allergies reviewed with patient and updates made in EHR: yes    Medication History Completed By: Alvin Cruz [facility] 7/8/2023 6:15 PM    Prior to Admission medications    Medication Sig Last Dose Taking? Auth Provider Long Term End Date   albuterol (PROAIR HFA/PROVENTIL HFA/VENTOLIN HFA) 108 (90 Base) MCG/ACT inhaler Inhale 2 puffs into the lungs every 6 hours as needed for shortness of breath, wheezing or cough Unknown at PRN Yes Bubba Powers MD Yes    buPROPion (WELLBUTRIN SR) 150 MG 12 hr tablet Take 150 mg by mouth daily 7/8/2023 at AM Yes Unknown, Entered By History No    chlorthalidone (HYGROTON) 25 MG tablet Take 0.5 tablets (12.5 mg) by mouth daily 7/8/2023 at AM Yes Tristen Ramirez MD Yes    esomeprazole (NEXIUM) 40 MG DR capsule Take 1 capsule (40 mg) by mouth every morning (before breakfast) 7/8/2023 at AM Yes Bubba Powers MD     fluticasone-salmeterol (ADVAIR) 500-50 MCG/ACT inhaler Inhale 1 puff into the lungs every 12 hours 7/8/2023 at X1 Yes Bubba Powers MD Yes    levothyroxine (SYNTHROID/LEVOTHROID) 175 MCG tablet Take 1 tablet (175 mcg) by mouth daily 7/8/2023 at AM Yes Bubba Powers MD  Yes    losartan (COZAAR) 100 MG tablet Take 1 tablet (100 mg) by mouth daily 7/8/2023 at AM Yes Bubba Powers MD Yes    zolpidem ER (AMBIEN CR) 6.25 MG CR tablet Take 6.25 mg by mouth At Bedtime 7/7/2023 at HS Yes Unknown, Entered By History

## 2023-07-08 NOTE — CARE PLAN
ROOM # 204-1    Living Situation (if not independent, order SW consult):  Facility name:  : [facility] - [phone]     Activity level at baseline: independent   Activity level on admit: SBA    Who will be transporting you at discharge: Wife    Patient registered to observation; given Patient Bill of Rights; given the opportunity to ask questions about observation status and their plan of care.  Patient has been oriented to the observation room, bathroom and call light is in place.

## 2023-07-08 NOTE — ED NOTES
[facility]  ED Nurse Handoff Report    ED Chief complaint: Chest Pain  . ED Diagnosis:   Final diagnoses:   Chest pain, unspecified type       Allergies:   Allergies   Allergen Reactions     Ace Inhibitors Unknown     Bicillin C-R, Unknown     Penicillins Difficulty breathing, Hives, Itching and Swelling     rash         Code Status: Full Code    Activity level - Baseline/Home:  independent.  Activity Level - Current:   independent.   Lift room needed: No.   Bariatric: No   Needed: No   Isolation: No.   Infection: Not Applicable.     Respiratory status: Room air    Vital Signs (within 30 minutes):   Vitals:    07/08/23 1416   BP: (!) 169/106   Pulse: 89   Resp: 20   Temp: 98.4  F (36.9  C)   TempSrc: Oral   SpO2: 99%   Weight: 94.7 kg (208 lb 12.4 oz)   Height: 1.829 m (6')       Cardiac Rhythm:  ,      Pain level:    Patient confused: No.   Patient Falls Risk: patient and family education.   Elimination Status: Has voided     Patient Report - Initial Complaint: chest pain.   Focused Assessment: Pt arrives to the ED due to having mid sternal chest pain that has been intermittent for the past 2 weeks. Pt states the pain is worse with activity. States today the pain began around 1000 and has been more constant. Pt states that the pain will occasionally move into left shoulder. C/o dizziness. Nausea. Was at [place] [employer] this AM.     Abnormal Results:   Labs Ordered and Resulted from Time of ED Arrival to Time of ED Departure   BASIC METABOLIC PANEL - Abnormal       Result Value    Sodium 138      Potassium 3.5      Chloride 101      Carbon Dioxide (CO2) 27      Anion Gap 10      Urea Nitrogen 10.9      Creatinine 0.85      Calcium 9.6      Glucose 110 (*)     GFR Estimate >90     CBC WITH PLATELETS AND DIFFERENTIAL - Abnormal    WBC Count 8.6      RBC Count 5.61      Hemoglobin 11.4 (*)     Hematocrit 36.5 (*)     MCV 65 (*)     MCH 20.3 (*)     MCHC 31.2 (*)     RDW 16.5 (*)      Platelet Count 185      % Neutrophils 68      % Lymphocytes 23      % Monocytes 7      % Eosinophils 1      % Basophils 1      % Immature Granulocytes 0      NRBCs per 100 WBC 0      Absolute Neutrophils 5.9      Absolute Lymphocytes 2.0      Absolute Monocytes 0.6      Absolute Eosinophils 0.0      Absolute Basophils 0.0      Absolute Immature Granulocytes 0.0      Absolute NRBCs 0.0     TROPONIN T, HIGH SENSITIVITY - Normal    Troponin T, High Sensitivity 7     TROPONIN T, HIGH SENSITIVITY - Normal    Troponin T, High Sensitivity 7     FERRITIN   IRON AND IRON BINDING CAPACITY   LIPID REFLEX TO DIRECT LDL PANEL   HEMOGLOBIN A1C        XR Chest 2 Views    (Results Pending)       Treatments provided: See MAR  Family Comments: family at bedside  OBS brochure/video discussed/provided to patient:  Yes  ED Medications:   Medications   aspirin (ASA) chewable tablet 81 mg (has no administration in time range)   hydrALAZINE (APRESOLINE) injection 10 mg (has no administration in time range)   atorvastatin (LIPITOR) tablet 80 mg (has no administration in time range)   aspirin (ASA) tablet 325 mg (325 mg Oral $Given 7/8/23 5433)       Drips infusing:  No  For the majority of the shift this patient was Green.   Interventions performed were NA.    Sepsis treatment initiated: No    Cares/treatment/interventions/medications to be completed following ED care: NA    ED Nurse Name: Nereida Hankins RN  5:19 PM'    RECEIVING UNIT ED HANDOFF REVIEW    Above ED Nurse Handoff Report was reviewed: Yes  Reviewed by: Ansley Pedersen RN on July 8, 2023 at 5:54 PM

## 2023-07-08 NOTE — ED PROVIDER NOTES
History     Chief Complaint:  Chest Pain       HPI   Sunny Hayes is a 48 year old male who presents with 2 weeks of intermittent left-sided chest pain. The pain is worse with exertion and improves with rest. He notices this mostly as he goes on longer walks. He rates the pain a 4/10, and describes it as an intermittent squeezing sensation. Today, he was going on roller coasters and walking around [facility], when the pain began and lasted for about 4 hours, which is the longest it has lasted. He has been experiencing associated fatigue, lightheadedness, and slight numbness in his bilateral fingers since this episode. Here in the ER, his symptoms has mostly resolved and he is chest pain free. However, he still feels fatigued. He denies blood clot history, hormone use, recent surgery, or trauma. He does mention a strong family history of heart disease in which his father had a MI at age 50 and now has heart failure.    Of note, he mentions recent struggles with controlling his hypertension, as he has noticed his blood pressures running higher than usual recently as well.    Review of Systems   Constitutional: Positive for fatigue.   HENT: Negative for congestion and rhinorrhea.    Respiratory: Negative for cough and shortness of breath.    Cardiovascular: Positive for chest pain. Negative for palpitations.   Gastrointestinal: Negative for abdominal pain and blood in stool.   Genitourinary: Negative for dysuria and hematuria.   Neurological: Positive for light-headedness. Negative for weakness, numbness and headaches.   All other systems reviewed and are negative.      Independent Historian:   None - Patient Only    Review of External Notes:   6/26/23 virtual visit note       Medications:    Albuterol  Wellbutrin  Hygroton  Nexium  Pepcid  Advair  Synthroid  Cozaar  Ambien    Past Medical History:    GERD  Hypertension  Hypothyroidism  Asthma    Past Surgical History:    None     Physical Exam     Patient Vitals  for the past 24 hrs:   BP Temp Temp src Pulse Resp SpO2 Height Weight   07/08/23 1833 (!) 147/92 98.5  F (36.9  C) Oral 77 18 97 % 1.829 m (6') 91.4 kg (201 lb 8 oz)   07/08/23 1416 (!) 169/106 98.4  F (36.9  C) Oral 89 20 99 % 1.829 m (6') 94.7 kg (208 lb 12.4 oz)        Physical Exam  Constitutional:       General: Not in acute distress.     Appearance: Normal appearance.   HENT:      Head: Normocephalic and atraumatic.   Eyes:      Extraocular Movements: Extraocular movements intact.      Conjunctiva/sclera: Conjunctivae normal.   Cardiovascular:      Rate and Rhythm: Regular rate and rhythm.  Pulmonary:      Effort: Pulmonary effort is normal. No respiratory distress.      Breath sounds: Normal breath sounds.  Abdominal:      General: Abdomen is flat. There is no distension.      Palpations: Abdomen is soft.      Tenderness: There is no abdominal tenderness.   Musculoskeletal:      Cervical back: Normal range of motion. No rigidity.      Right lower leg: No edema.      Left lower leg: No edema.   Skin:     General: Skin is warm and dry.   Neurological:      General: No focal deficit present.      Mental Status: Alert and oriented to person, place, and time.   Psychiatric:         Mood and Affect: Mood normal.         Behavior: Behavior normal.    Emergency Department Course     ECG results from 07/08/23   EKG 12 lead     Value    Systolic Blood Pressure     Diastolic Blood Pressure     Ventricular Rate 86    Atrial Rate 86    LA Interval 164    QRS Duration 100        QTc 449    P Axis 53    R AXIS -6    T Axis 31    Interpretation ECG      Sinus rhythm  Normal ECG  No previous ECGs available     EKG 12 lead     Value    Systolic Blood Pressure     Diastolic Blood Pressure     Ventricular Rate 74    Atrial Rate 74    LA Interval 166    QRS Duration 100        QTc 437    P Axis 40    R AXIS 1    T Axis 7    Interpretation ECG      Sinus rhythm  Normal ECG  When compared with ECG of 08-JUL-2023 14:50,  (unconfirmed)  No significant change was found       See ED course for independent interpretation of ECG    Imaging:  XR Chest 2 Views   Final Result   IMPRESSION: Negative chest.      Dobutamine Stress Echocardiogram    (Results Pending)      Report per radiology    Laboratory:  Labs Ordered and Resulted from Time of ED Arrival to Time of ED Departure   BASIC METABOLIC PANEL - Abnormal       Result Value    Sodium 138      Potassium 3.5      Chloride 101      Carbon Dioxide (CO2) 27      Anion Gap 10      Urea Nitrogen 10.9      Creatinine 0.85      Calcium 9.6      Glucose 110 (*)     GFR Estimate >90     CBC WITH PLATELETS AND DIFFERENTIAL - Abnormal    WBC Count 8.6      RBC Count 5.61      Hemoglobin 11.4 (*)     Hematocrit 36.5 (*)     MCV 65 (*)     MCH 20.3 (*)     MCHC 31.2 (*)     RDW 16.5 (*)     Platelet Count 185      % Neutrophils 68      % Lymphocytes 23      % Monocytes 7      % Eosinophils 1      % Basophils 1      % Immature Granulocytes 0      NRBCs per 100 WBC 0      Absolute Neutrophils 5.9      Absolute Lymphocytes 2.0      Absolute Monocytes 0.6      Absolute Eosinophils 0.0      Absolute Basophils 0.0      Absolute Immature Granulocytes 0.0      Absolute NRBCs 0.0     TROPONIN T, HIGH SENSITIVITY - Normal    Troponin T, High Sensitivity 7     TROPONIN T, HIGH SENSITIVITY - Normal    Troponin T, High Sensitivity 7     IRON AND IRON BINDING CAPACITY - Normal    Iron 75      Iron Binding Capacity 319      Iron Sat Index 24     HEMOGLOBIN A1C - Normal    Hemoglobin A1C 5.0     FERRITIN   LIPID REFLEX TO DIRECT LDL PANEL        Emergency Department Course & Assessments:    Interventions:  Medications   melatonin tablet 1 mg (has no administration in time range)   ondansetron (ZOFRAN ODT) ODT tab 4 mg (has no administration in time range)     Or   ondansetron (ZOFRAN) injection 4 mg (has no administration in time range)   acetaminophen (TYLENOL) tablet 650 mg (650 mg Oral $Given 7/8/23 9315)     Or    acetaminophen (TYLENOL) Suppository 650 mg ( Rectal See Alternative 7/8/23 1857)   aspirin (ASA) chewable tablet 81 mg (has no administration in time range)   hydrALAZINE (APRESOLINE) injection 10 mg (has no administration in time range)   atorvastatin (LIPITOR) tablet 80 mg (80 mg Oral $Given 7/8/23 2015)   HOLD: Beta Blockers The evening before and the morning of procedure. (has no administration in time range)   morphine (PF) injection 2 mg (has no administration in time range)   aspirin (ASA) tablet 325 mg (325 mg Oral $Given 7/8/23 1654)        Independent Interpretation (X-rays, CTs, rhythm strip):    XR Chest 2 Views  On my independent interpretation of chest x-ray, there is no focal opacity, mediastinal widening, or pneumothorax.    Assessments/Consultations/Discussion of Management or Tests:  ED Course as of 07/08/23 2248   Sat Jul 08, 2023   1637 I obtained history and examined the patient, as noted above.     1637 EKG 12-lead, tracing only  Normal sinus rhythm.  Rate of 86.  Normal NJ and QRS.  Normal QTc.  No acute ST elevation or depression.  No prior ECG for comparison.   1655 EKG 12-lead, tracing only  Normal sinus rhythm.  Rate of 74.  Normal NJ and QRS.  Normal QTc.  No acute ST elevation or depression.  No significant change from prior ECG.   1714 Discussed patient with hospitalist Dr. Rudd who accepts patient for observation admission.   1718 XR Chest 2 Views  On my independent interpretation of chest x-ray, there is no focal opacity, mediastinal widening, or pneumothorax.       Social Determinants of Health affecting care:   None    Disposition:  The patient was admitted to the hospital under the care of Dr. Paris.     Impression & Plan    CMS Diagnoses: None    HEART Score  Background  Calculates the overall risk of adverse event in patient's presenting with chest pain.  Based on 5 criteria (each assigned 0-2 points) including suspiciousness of history, EKG, age, risk factors  and troponin.    Data  48 year old male  has Hypothyroidism; Essential hypertension; John's esophagus without dysplasia  - lifelong ppi; Asthma; Gilbert's syndrome; and Beta thalassemia - baseline Hgb 10 on their problem list.   reports that he quit smoking about 19 years ago. He quit smokeless tobacco use about 12 years ago.  family history is not on file.  Recent Labs   Lab 23  1646 23  1422   CTROPT 7 7     Criteria   0-2 points for each of 5 items (maximum of 10 points):  Score 2- History highly suspicious for coronary syndrome  Score 0- EKG Normal  Score 1- Age 45 to 65 years old  Score 1- One to 2 risk factors for atherosclerotic disease  Score 0- Within normal limits for troponin levels  Interpretation  Risk of adverse outcome  Heart Score: 4  Total Score 4-6- Adverse Outcome Risk 20.3% - Supports admission with standard rule-out management -serial troponins and stress testing      PERC Rule for risk stratifying PE to low risk (calculator)  Background  Risk stratifies patients to low risk of PE if all 8 criteria are present including age <50, heart rate <100, O2 Sat >94%, no unilateral leg edema, no hemoptysis, no recent surgery or trauma, no prior VTE, and no hormone use.  Data  48 year old  has Hypothyroidism; Essential hypertension; John's esophagus without dysplasia  - lifelong ppi; Asthma; Gilbert's syndrome; and Beta thalassemia - baseline Hgb 10 on their problem list.  Pulse: 89  SpO2: 99 %  Criteria   Of  8 possible items (all criteria must be present):  Age <50 years  Heart rate <100 bpm  Oxygen Saturation >94%  No unilateral leg swelling  No hemoptysis  No surgery or trauma within 4 weeks  No prior DVT or PE  No hormone use (oral, transdermal and intravaginal estrogens)  Interpretation  All eight criteria are met AND low clinical PE suspicion: No further evaluation for PE required    Medical Decision Makin-year-old male as described above presents to the emergency department  for exertional chest pain ongoing intermittently for the past 2 weeks with worsening of symptoms today.  Patient hemodynamically stable at time evaluation.  Afebrile.  Initial EKG does not demonstrate STEMI.  Differential diagnosis considered includes, but not limited to, ACS, PE, and dissection.  Cardiac work-up will require 2 set troponin given intermittent symptoms.  Patient currently chest pain-free at this time.  Nonetheless, heart score of 4 and convincing history given exertional nature of his pain and father had MI and now heart failure at age 50, patient will likely benefit from observation admission for inpatient stress testing.  Full dose aspirin.  No indication for heparin drip at this time.  Trend troponin.  Chest x-ray for evaluation for mediastinal widening.  Repeat EKG for evaluation for dynamic changes.  Discussed care plan with patient who voiced understanding and agreement with plan.  Answered all questions.  Additional work-up and orders as listed in chart.    Please refer to ED course above for details on the patient's treatment course and any changes or updates in care plan beyond my initial evaluation and MDM.    Diagnosis:    ICD-10-CM    1. Chest pain, unspecified type  R07.9              Scribe Disclosure:  Go MERINO, am serving as a scribe at 4:43 PM on 7/8/2023 to document services personally performed by Lee Myrick DO based on my observations and the provider's statements to me.      Lee Myrick DO  07/08/23 2245

## 2023-07-08 NOTE — H&P
[facility]  Hospitalist History & Physical     Assessment & Plan     ASSESSMENT    48M with hx of HTN and beta thalassemia minor presents with exertional left-sided chest pain. HEART Score of 4 and story fairly convincing so would recommend inpatient stress testing.    PLAN    Chest Pain  -Presents with exertional left-sided chest pain that radiates into left neck x2w  -Trops on adm neg, has TWI in lead III, unclear if new  -HEART Score of 4 and story fairly convincing so would recommend inpatient stress testing  PLAN  -Stress echo ordered for the morning  -ASA 81mg every day, start statin until stress test results are known, lipid panel and HgA1c  -Morphine PRN for chest pain  -If stress echo neg could pursue pericarditis dx given concurrent ear fullness/ringing although EKG and sx not typical for this    Left Ear Fullness and Ringing  -2w hx, denies sinus congestion, fevers, chills, or hearing loss  -Presumably viral and has apt with ENT coming up    Essential HTN w/ Hypertensive Urgency  -Home antihypertensives once reconciled  -Hydralazine for SBP >180    Beta Thalassemia Minor   -Microcytic anemia MCV 65, iron panel wnl, Hg at his baseline    Hypothyroidism  -Home Synthroid    DVT Prophy  -SCDs    Disposition  -Observation Unit      Abraham Rudd MD    History of Present Illness     Sunny Hayes is a 48 year old with hx of HTN and beta thalassemia minor presents with chest pain.  Patient has had 2 weeks of chest pain that has been worse with exertion.  Chest pain is left-sided and pressure-like sensation.  Radiates into her left neck.  Denies associated shortness of breath.  History of hypertension but no history of CAD, cardiac stents, or stroke.  Father does have extensive cardiac history.  Also notes left your fullness sensation with ringing. Denies sinus congestion, fevers, chills, or hearing loss.  Has an appointment coming up with the ENT to address this.  In the ED, presents of but  other vital signs stable.  Troponins negative x2.  EKG with TWI in lead III but no past EKG to compare this to.  Other labs largely within normal limits.  Chest x-ray negative.  Patient admitted to medicine.    Review of Systems     A Comprehensive greater than 10 system review of systems was carried out.  Pertinent positives and negatives are noted above.  Otherwise negative for contributory information.     Past Medical History     Past Medical History:   Diagnosis Date     Gastroesophageal reflux disease      Hypertension      Hypothyroidism      Uncomplicated asthma      Medications     Current Outpatient Medications   Medication Sig Dispense Refill     albuterol (PROAIR HFA/PROVENTIL HFA/VENTOLIN HFA) 108 (90 Base) MCG/ACT inhaler Inhale 2 puffs into the lungs every 6 hours as needed for shortness of breath, wheezing or cough 18 g 3     buPROPion (WELLBUTRIN SR) 150 MG 12 hr tablet Take 1 tablet (150 mg) by mouth 2 times daily 180 tablet 0     chlorthalidone (HYGROTON) 25 MG tablet Take 0.5 tablets (12.5 mg) by mouth daily 45 tablet 0     esomeprazole (NEXIUM) 40 MG DR capsule Take 1 capsule (40 mg) by mouth every morning (before breakfast) 90 capsule 3     fluticasone-salmeterol (ADVAIR) 500-50 MCG/ACT inhaler Inhale 1 puff into the lungs every 12 hours 60 each 3     levothyroxine (SYNTHROID/LEVOTHROID) 175 MCG tablet Take 1 tablet (175 mcg) by mouth daily 90 tablet 3     losartan (COZAAR) 100 MG tablet Take 1 tablet (100 mg) by mouth daily 90 tablet 3     zolpidem ER (AMBIEN CR) 6.25 MG CR tablet Take 1 tablet (6.25 mg) by mouth nightly as needed for sleep 90 tablet 3     Past Surgical History   Reviewed and noncontributory to presenting complaints.     Family History   Father with heart disease.    Allergies     Allergies   Allergen Reactions     Ace Inhibitors Unknown     Bicillin C-R, Unknown     Penicillins Difficulty breathing, Hives, Itching and Swelling     rash       Social History     Social History      Tobacco Use     Smoking status: Former     Types: Cigarettes     Quit date: 2003     Years since quittin.5     Smokeless tobacco: Former     Quit date: 2010   Substance Use Topics     Alcohol use: Not on file     Comment: 1-2 drinks per week     Physical Exam   Blood pressure (!) 169/106, pulse 89, temperature 98.4  F (36.9  C), temperature source Oral, resp. rate 20, height 1.829 m (6'), weight 94.7 kg (208 lb 12.4 oz), SpO2 99 %.    General: Ill appearing, cooperative with exam, in NAD.  HEENT: Atraumatic. No erythema in posterior pharynx.  Lymph: No cervical or inguinal lymphadenopathy.  Cardiac: RRR. No murmurs.  Lungs: CTAB. Nl WOB.  Abd: Non-tender. No rebound or gaurding. Nl bowel sounds.  Ext: No edema. 2+ pulses.  Skin: No rashes, abrasions, or contusions.  Psych: A&Ox3. Nl affect.  Neuro: 5/5 strength. Sensation intact.    Labs & Imaging     Reviewed and Pertinent results discussed in assessment and plan.

## 2023-07-08 NOTE — ED TRIAGE NOTES
Pt arrives to the ED due to having mid sternal chest pain that has been intermittent for the past 2 weeks. Pt states the pain is worse with activity. States today the pain began around 1000 and has been more constant. Pt states that the pain will occasionally move into left shoulder. C/o dizziness. Nausea. Was at [place] this AM.

## 2023-07-09 ENCOUNTER — APPOINTMENT (OUTPATIENT)
Dept: CARDIOLOGY | Facility: CLINIC | Age: 48
End: 2023-07-09
Attending: INTERNAL MEDICINE
Payer: OTHER GOVERNMENT

## 2023-07-09 VITALS
RESPIRATION RATE: 18 BRPM | TEMPERATURE: 97.9 F | WEIGHT: 201.5 LBS | SYSTOLIC BLOOD PRESSURE: 127 MMHG | OXYGEN SATURATION: 97 % | HEART RATE: 83 BPM | BODY MASS INDEX: 27.29 KG/M2 | HEIGHT: 72 IN | DIASTOLIC BLOOD PRESSURE: 92 MMHG

## 2023-07-09 PROCEDURE — 93321 DOPPLER ECHO F-UP/LMTD STD: CPT | Mod: 26 | Performed by: INTERNAL MEDICINE

## 2023-07-09 PROCEDURE — 93350 STRESS TTE ONLY: CPT | Mod: 26 | Performed by: INTERNAL MEDICINE

## 2023-07-09 PROCEDURE — 255N000002 HC RX 255 OP 636: Performed by: INTERNAL MEDICINE

## 2023-07-09 PROCEDURE — G0378 HOSPITAL OBSERVATION PER HR: HCPCS

## 2023-07-09 PROCEDURE — 99238 HOSP IP/OBS DSCHRG MGMT 30/<: CPT | Performed by: PHYSICIAN ASSISTANT

## 2023-07-09 PROCEDURE — 250N000013 HC RX MED GY IP 250 OP 250 PS 637: Performed by: INTERNAL MEDICINE

## 2023-07-09 PROCEDURE — 250N000013 HC RX MED GY IP 250 OP 250 PS 637: Performed by: PHYSICIAN ASSISTANT

## 2023-07-09 PROCEDURE — 93325 DOPPLER ECHO COLOR FLOW MAPG: CPT | Mod: 26 | Performed by: INTERNAL MEDICINE

## 2023-07-09 PROCEDURE — 93016 CV STRESS TEST SUPVJ ONLY: CPT | Performed by: INTERNAL MEDICINE

## 2023-07-09 PROCEDURE — 93018 CV STRESS TEST I&R ONLY: CPT | Performed by: INTERNAL MEDICINE

## 2023-07-09 PROCEDURE — 93325 DOPPLER ECHO COLOR FLOW MAPG: CPT | Mod: TC

## 2023-07-09 RX ORDER — NALOXONE HYDROCHLORIDE 0.4 MG/ML
0.4 INJECTION, SOLUTION INTRAMUSCULAR; INTRAVENOUS; SUBCUTANEOUS
Status: DISCONTINUED | OUTPATIENT
Start: 2023-07-09 | End: 2023-07-09 | Stop reason: HOSPADM

## 2023-07-09 RX ORDER — FLUTICASONE PROPIONATE AND SALMETEROL 500; 50 UG/1; UG/1
1 POWDER RESPIRATORY (INHALATION) EVERY 12 HOURS
Status: DISCONTINUED | OUTPATIENT
Start: 2023-07-09 | End: 2023-07-09

## 2023-07-09 RX ORDER — LOSARTAN POTASSIUM 100 MG/1
100 TABLET ORAL DAILY
Status: DISCONTINUED | OUTPATIENT
Start: 2023-07-09 | End: 2023-07-09 | Stop reason: HOSPADM

## 2023-07-09 RX ORDER — NALOXONE HYDROCHLORIDE 0.4 MG/ML
0.2 INJECTION, SOLUTION INTRAMUSCULAR; INTRAVENOUS; SUBCUTANEOUS
Status: DISCONTINUED | OUTPATIENT
Start: 2023-07-09 | End: 2023-07-09 | Stop reason: HOSPADM

## 2023-07-09 RX ORDER — ZOLPIDEM TARTRATE 5 MG/1
5 TABLET ORAL
Status: DISCONTINUED | OUTPATIENT
Start: 2023-07-09 | End: 2023-07-09 | Stop reason: HOSPADM

## 2023-07-09 RX ORDER — PANTOPRAZOLE SODIUM 40 MG/1
40 TABLET, DELAYED RELEASE ORAL
Status: DISCONTINUED | OUTPATIENT
Start: 2023-07-09 | End: 2023-07-09 | Stop reason: HOSPADM

## 2023-07-09 RX ORDER — BUPROPION HYDROCHLORIDE 150 MG/1
150 TABLET, EXTENDED RELEASE ORAL DAILY
Status: DISCONTINUED | OUTPATIENT
Start: 2023-07-09 | End: 2023-07-09 | Stop reason: HOSPADM

## 2023-07-09 RX ADMIN — BUPROPION HYDROCHLORIDE 150 MG: 150 TABLET, EXTENDED RELEASE ORAL at 10:24

## 2023-07-09 RX ADMIN — ASPIRIN 81 MG: 81 TABLET, CHEWABLE ORAL at 07:51

## 2023-07-09 RX ADMIN — LEVOTHYROXINE SODIUM 175 MCG: 125 TABLET ORAL at 07:51

## 2023-07-09 RX ADMIN — CHLORTHALIDONE 12.5 MG: 25 TABLET ORAL at 10:24

## 2023-07-09 RX ADMIN — HUMAN ALBUMIN MICROSPHERES AND PERFLUTREN 4 ML: 10; .22 INJECTION, SOLUTION INTRAVENOUS at 09:45

## 2023-07-09 RX ADMIN — ACETAMINOPHEN 650 MG: 325 TABLET, FILM COATED ORAL at 10:25

## 2023-07-09 RX ADMIN — PANTOPRAZOLE SODIUM 40 MG: 40 TABLET, DELAYED RELEASE ORAL at 07:51

## 2023-07-09 RX ADMIN — LOSARTAN POTASSIUM 100 MG: 100 TABLET, FILM COATED ORAL at 10:24

## 2023-07-09 ASSESSMENT — ACTIVITIES OF DAILY LIVING (ADL)
ADLS_ACUITY_SCORE: 35

## 2023-07-09 NOTE — PLAN OF CARE
PRIMARY DIAGNOSIS: CHEST PAIN  OUTPATIENT/OBSERVATION GOALS TO BE MET BEFORE DISCHARGE:  1. Ruled out acute coronary syndrome (negative or stable Troponin):  Yes  2. Pain Status: Improved-controlled with oral pain medications.  3. Appropriate provocative testing performed: No  - Stress Test Procedure: Stress ECHO scheduled in the morning   - Interpretation of cardiac rhythm per telemetry tech: SR 65    4. Cleared by Consultants (if applicable):No  5. Return to near baseline physical activity: Yes  Discharge Planner Nurse   Safe discharge environment identified: Yes  Barriers to discharge: Yes   A & O X 4. Pleasant. Lung sound clear bilaterally to auscultation . Denies chest pain, shortness of breath, lightheadedness, nausea, vomit, or diarrhea. Bowel sound present all quads and active. Independent in the room. NPO status effective midnight tonight for stress ECHO this morning.Peripheral IV saline lock. Patient on cardiac monitor, cardiac rhythm SR 65 per telemetry tech. IV Morphine Q 4 hours available for chest pain.   Beta blockers on hold this evening and the morning of procedure.Pain is managed with Tylenol. Afebrile.  Will continue to provide supportive cares.  BP (!) 151/90 (BP Location: Right arm)   Pulse 65   Temp 98  F (36.7  C) (Oral)   Resp 18   Ht 1.829 m (6')   Wt 91.4 kg (201 lb 8 oz)   SpO2 95%   BMI 27.33 kg/m           Entered by: Maryann Meza RN 07/09/2023 03:26     Please review provider order for any additional goals.   Nurse to notify provider when observation goals have been met and patient is ready for discharge.

## 2023-07-09 NOTE — PLAN OF CARE
A&O x 4. Pt stable and ready for discharge, Vital signs stable. Discharge instructions, signs and symptoms to report, new medications, diet, activity, and follow up appointments reviewed with patient. All questions answered and verbalized understanding.    IV out and belongings sent home with pt    Patient's After Visit Summary was reviewed with patient.   Patient verbalized understanding of After Visit Summary, recommended follow up and was given an opportunity to ask questions.   Discharge medications sent home with patient/family: Not applicable   Discharged with other:Self        Goal Outcome Evaluation:      Plan of Care Reviewed With: patient

## 2023-07-09 NOTE — DISCHARGE SUMMARY
"[facility]  Hospitalist Discharge Summary      Date of Admission:  7/8/2023  Date of Discharge:  7/9/2023  Discharging Provider: Nan Luz PA-C  Discharge Service: Hospitalist Service    Discharge Diagnoses   Hypertension      Clinically Significant Risk Factors     # Overweight: Estimated body mass index is 27.33 kg/m  as calculated from the following:    Height as of this encounter: 1.829 m (6').    Weight as of this encounter: 91.4 kg (201 lb 8 oz).       Follow-ups Needed After Discharge   Follow-up Appointments     Follow-up and recommended labs and tests       Keep appointments as scheduled            Unresulted Labs Ordered in the Past 30 Days of this Admission     No orders found for last 31 day(s).          Discharge Disposition   Discharged to home  Condition at discharge: Stable    Hospital Course   48M with hx of HTN and beta thalassemia minor presents with exertional left-sided chest pain.     \" Patient has had 2 weeks of chest pain that has been worse with exertion.  Chest pain is left-sided and pressure-like sensation.  Radiates into her left neck.  Denies associated shortness of breath.  History of hypertension but no history of CAD, cardiac stents, or stroke.  Father does have extensive cardiac history.  Also notes left your fullness sensation with ringing. Denies sinus congestion, fevers, chills, or hearing loss.  Has an appointment coming up with the ENT to address this\"    In the ED, presents of but other vital signs stable.  Troponins negative x2.  EKG with TWI in lead III but no past EKG to compare this to.  Other labs largely within normal limits.  Chest x-ray negative.  Patient admitted to medicine.    He was monitored on telemetry overnight with no abnormality. Cardiac enzymes x 2 were negative. He did a stress echocardiogram on treadmill and exhibited no chest discomfort. Stress ECG was normal and echocardiogram did not show wall motion abnormality. " Given he had no symptoms suspicion for coronary source of pain was very low and he was discharged to follow up with primary care and cardiology referral was placed to help with blood pressure management. Given blood pressure not responding well to new BP medicine outpatient renal ultrasound ordered.             Consultations This Hospital Stay   None    Code Status   Full Code    Time Spent on this Encounter   I, Nan Luz PA-C, personally saw the patient today and spent less than or equal to 30 minutes discharging this patient.       Nan Luz PA-C  [facility] OBSERVATION DEPT  [address] [facility] [address]  Phone: [phone]  ______________________________________________________________________    Physical Exam   Vital Signs: Temp: 97.9  F (36.6  C) Temp src: Oral BP: (!) 127/92 Pulse: 83   Resp: 18 SpO2: 97 % O2 Device: None (Room air)    Weight: 201 lbs 8.01 oz  General Appearance: Alert and orientated x 3  Respiratory: CTAB  Cardiovascular: RRR without murmur  GI: Bowel sounds are present without tenderness  Skin: No rash or open sores         Primary Care Physician   Bubba Powers    Discharge Orders      US Renal Artery with Kidney Left     US Renal Artery with Kidney Right     Adult Cardiology Eval  Referral      Reason for your hospital stay    You were admitted for concerns of exertional chest discomfort.  Fortunately your cardiac enzymes were negative for heart attack.  Telemetry was unremarkable.  You did a stress echocardiogram that was normal.  We recommend better management of your blood pressure.  We have ordered a renal ultrasound to look for renal artery stenosis and also a cardiology referral to help with your blood pressure management.     Follow-up and recommended labs and tests     Keep appointments as scheduled     Activity    Your activity upon discharge: activity as tolerated     Diet    Follow this diet upon discharge:  Regular       Significant Results and Procedures   Most Recent 3 CBC's:Recent Labs   Lab Test 23  1422 23  1619 22  1728   WBC 8.6 5.7  --    HGB 11.4* 11.8* 10.6*   MCV 65* 65*  --     203  --      Most Recent 3 BMP's:Recent Labs   Lab Test 23  1422 23  1619 21  1058    141 138   POTASSIUM 3.5 3.9 4.2   CHLORIDE 101 104 107   CO2 27 25 26   BUN 10.9 10.0 10   CR 0.85 1.00 0.81   ANIONGAP 10 12 5   FERNANDO 9.6 9.2 9.0   * 93 91     Most Recent 3 Troponin's:No lab results found.  Most Recent 3 BNP's:No lab results found.  Most Recent D-dimer:No lab results found.,   Results for orders placed or performed during the hospital encounter of 23   XR Chest 2 Views    Narrative    EXAM: XR CHEST 2 VIEWS  LOCATION: [facility]  DATE: 2023    INDICATION: exertional chest pain  COMPARISON: None.      Impression    IMPRESSION: Negative chest.   Dobutamine Stress Echocardiogram    Narrative    [id number]  [id number]  [id number]  [id number]^TAPAN^TONA^WENDI     [facility]  Echocardiography Laboratory  [address] [place] [address]     Name: BOBBY RODRIGUES  MRN: [id number]  : 1975  Study Date: 2023 09:29 AM  Age: 48 yrs  Gender: Male  Patient Location: [facility]  Reason For Study: Chest Pain  History: HTN  Ordering Physician: TONA PHELAN  Referring Physician: ROSSANA MESA  Performed By: Lolis Ward RDCS     BSA: 2.2 m2  Height: 72 in  Weight: 207 lb  HR: 82  BP: 140/90 mmHg  ______________________________________________________________________________  Procedure  Stress Echo Complete. Contrast Optison.  ______________________________________________________________________________  Interpretation Summary  The patient exhibited no chest pain during exercise.  The Duke treadmill score was low risk ( >5 Duke score).  This was a normal stress echocardiogram with no evidence of  stress-induced  ischemia. This was a normal stress EKG with no evidence of stress-induced  ischemia.  ______________________________________________________________________________  Stress  The patient exercised 11:16.  Exercise was stopped due to fatigue.  The patient exhibited no chest pain during exercise.  There was a borderline hypertensive BP response to exercise.  RPP 33,562.  Target Heart Rate was achieved.  A high workload was achieved.  The Duke treadmill score was low risk ( >5 Duke score).  A treadmill exercise test according to the Douglas protocol was performed.  No arrhythmia noted.  The EKG portion of this stress test was negative for inducible ischemia (see  echo results below).  The visual ejection fraction is >70%.  Left ventricular cavity size decreases with exercise.  Global LV systolic function augments with exercise.  Normal resting wall motion and no stress-induced wall motion abnormality.     Baseline  Resting ECG is normal.  The patient is in normal sinus rhythm.  The visual ejection fraction is estimated at 55-60%.  No regional wall motion abnormalities noted.     Stress Results             Protocol:  Douglas          Maximum Predicted HR:   171 bpm             Target HR: 145 bpm        % Maximum Predicted HR: 101 %                             Stage  DurationHeart Rate  BP                                  (mm:ss)   (bpm)                         Stage 1   3:00      108   154/94                         Stage 2   3:00      122   160/90                         Stage 3   3:00      144   186/94                         Stage 4   2:16      173   194/94                        RecoveryR  6:00      110   150/94            Stress Duration:   11:16 mm:ss *        Recovery Time: 6:00 mm:ss         Maximum Stress HR: 173 bpm *            METS:          13     Mitral Valve  There is trace mitral regurgitation.     Tricuspid Valve  There is trace tricuspid regurgitation.     Aortic Valve  The aortic valve is  trileaflet. No aortic regurgitation is present. No  hemodynamically significant valvular aortic stenosis.     Pulmonic Valve  There is mild (1+) pulmonic valvular regurgitation.  ______________________________________________________________________________  MMode/2D Measurements & Calculations  TAPSE: 1.9 cm     ______________________________________________________________________________  Report approved by: Denise Weiner 07/09/2023 11:03 AM               Discharge Medications   Current Discharge Medication List      CONTINUE these medications which have NOT CHANGED    Details   albuterol (PROAIR HFA/PROVENTIL HFA/VENTOLIN HFA) 108 (90 Base) MCG/ACT inhaler Inhale 2 puffs into the lungs every 6 hours as needed for shortness of breath, wheezing or cough  Qty: 18 g, Refills: 3    Comments: Pharmacy may dispense brand covered by insurance (Proair, or proventil or ventolin or generic albuterol inhaler)  Associated Diagnoses: Moderate persistent asthma, unspecified whether complicated      buPROPion (WELLBUTRIN SR) 150 MG 12 hr tablet Take 150 mg by mouth daily      chlorthalidone (HYGROTON) 25 MG tablet Take 0.5 tablets (12.5 mg) by mouth daily  Qty: 45 tablet, Refills: 0    Associated Diagnoses: Essential hypertension      esomeprazole (NEXIUM) 40 MG DR capsule Take 1 capsule (40 mg) by mouth every morning (before breakfast)  Qty: 90 capsule, Refills: 3    Associated Diagnoses: John's esophagus without dysplasia      fluticasone-salmeterol (ADVAIR) 500-50 MCG/ACT inhaler Inhale 1 puff into the lungs every 12 hours  Qty: 60 each, Refills: 3    Associated Diagnoses: Moderate persistent asthma, unspecified whether complicated      levothyroxine (SYNTHROID/LEVOTHROID) 175 MCG tablet Take 1 tablet (175 mcg) by mouth daily  Qty: 90 tablet, Refills: 3    Associated Diagnoses: Hypothyroidism, unspecified type      losartan (COZAAR) 100 MG tablet Take 1 tablet (100 mg) by mouth daily  Qty: 90 tablet, Refills: 3     Associated Diagnoses: Essential hypertension      zolpidem ER (AMBIEN CR) 6.25 MG CR tablet Take 6.25 mg by mouth At Bedtime           Allergies   Allergies   Allergen Reactions     Ace Inhibitors Unknown     Bicillin C-R, Unknown     Penicillins Difficulty breathing, Hives, Itching and Swelling     rash

## 2023-07-09 NOTE — PLAN OF CARE
PRIMARY DIAGNOSIS: CHEST PAIN  OUTPATIENT/OBSERVATION GOALS TO BE MET BEFORE DISCHARGE:  1. Ruled out acute coronary syndrome (negative or stable Troponin):  No  2. Pain Status: Pain free.  3. Appropriate provocative testing performed: No  - Stress Test Procedure: Echo  - Interpretation of cardiac rhythm per telemetry tech: NSR    4. Cleared by Consultants (if applicable):No  5. Return to near baseline physical activity: Yes  Discharge Planner Nurse   Safe discharge environment identified: Yes  Barriers to discharge: Yes       Entered by: Rasheeda Orta RN 07/09/2023 9:27 AM     Please review provider order for any additional goals.   Nurse to notify provider when observation goals have been met and patient is ready for discharge.

## 2023-07-09 NOTE — PLAN OF CARE
PRIMARY DIAGNOSIS: CHEST PAIN  OUTPATIENT/OBSERVATION GOALS TO BE MET BEFORE DISCHARGE:  1. Ruled out acute coronary syndrome (negative or stable Troponin):  Yes  2. Pain Status: Pain free.  3. Appropriate provocative testing performed: Yes  - Stress Test Procedure: Echo  - Interpretation of cardiac rhythm per telemetry tech: NSR    4. Cleared by Consultants (if applicable):Yes  5. Return to near baseline physical activity: Yes  Discharge Planner Nurse   Safe discharge environment identified: Yes  Barriers to discharge: No       Entered by: Rasheeda Orta RN 07/09/2023 12:53 PM     Please review provider order for any additional goals.   Nurse to notify provider when observation goals have been met and patient is ready for discharge.   A&O x 4. Up independent. Possible discharge- Stress test results pending

## 2023-07-09 NOTE — PLAN OF CARE
PRIMARY DIAGNOSIS: CHEST PAIN  OUTPATIENT/OBSERVATION GOALS TO BE MET BEFORE DISCHARGE:  1. Ruled out acute coronary syndrome (negative or stable Troponin):  Yes  2. Pain Status: Improved-controlled with oral pain medications.  3. Appropriate provocative testing performed: No  - Stress Test Procedure: Stress ECHO scheduled in the morning   - Interpretation of cardiac rhythm per telemetry tech: SR 65    4. Cleared by Consultants (if applicable):No  5. Return to near baseline physical activity: Yes  Discharge Planner Nurse   Safe discharge environment identified: Yes  Barriers to discharge: Yes   Effective NPO status midnight tonight except for ice chips and meds       Entered by: Maryann Meza RN 07/09/2023 1:00 AM     Please review provider order for any additional goals.   Nurse to notify provider when observation goals have been met and patient is ready for discharge.

## 2023-07-09 NOTE — PLAN OF CARE
PRIMARY DIAGNOSIS: CHEST PAIN  OUTPATIENT/OBSERVATION GOALS TO BE MET BEFORE DISCHARGE:  1. Ruled out acute coronary syndrome (negative or stable Troponin):  Yes  2. Pain Status: Improved-controlled with oral pain medications.  3. Appropriate provocative testing performed: No  - Stress Test Procedure: Stress ECHO scheduled in the morning   - Interpretation of cardiac rhythm per telemetry tech: SR 65    4. Cleared by Consultants (if applicable):No  5. Return to near baseline physical activity: Yes  Discharge Planner Nurse   Safe discharge environment identified: Yes  Barriers to discharge: Yes       Entered by: Maryann Meza RN 07/08/2023 10:00 PM     Please review provider order for any additional goals.   Nurse to notify provider when observation goals have been met and patient is ready for discharge.

## 2023-07-09 NOTE — CARE PLAN
New admit from [facility]. Given Tylenol for Headache 7/10. Placed on SBA due to some dizziness. NPO at 0001 for stress test.

## 2023-07-10 LAB
ATRIAL RATE - MUSE: 74 BPM
ATRIAL RATE - MUSE: 86 BPM
DIASTOLIC BLOOD PRESSURE - MUSE: NORMAL MMHG
DIASTOLIC BLOOD PRESSURE - MUSE: NORMAL MMHG
INTERPRETATION ECG - MUSE: NORMAL
INTERPRETATION ECG - MUSE: NORMAL
P AXIS - MUSE: 40 DEGREES
P AXIS - MUSE: 53 DEGREES
PR INTERVAL - MUSE: 164 MS
PR INTERVAL - MUSE: 166 MS
QRS DURATION - MUSE: 100 MS
QRS DURATION - MUSE: 100 MS
QT - MUSE: 376 MS
QT - MUSE: 394 MS
QTC - MUSE: 437 MS
QTC - MUSE: 449 MS
R AXIS - MUSE: -6 DEGREES
R AXIS - MUSE: 1 DEGREES
SYSTOLIC BLOOD PRESSURE - MUSE: NORMAL MMHG
SYSTOLIC BLOOD PRESSURE - MUSE: NORMAL MMHG
T AXIS - MUSE: 31 DEGREES
T AXIS - MUSE: 7 DEGREES
VENTRICULAR RATE- MUSE: 74 BPM
VENTRICULAR RATE- MUSE: 86 BPM

## 2023-07-17 ENCOUNTER — HOSPITAL ENCOUNTER (OUTPATIENT)
Dept: ULTRASOUND IMAGING | Facility: CLINIC | Age: 48
Discharge: HOME | End: 2023-07-17
Attending: PHYSICIAN ASSISTANT | Admitting: PHYSICIAN ASSISTANT
Payer: OTHER GOVERNMENT

## 2023-07-17 ENCOUNTER — OFFICE VISIT (OUTPATIENT)
Dept: CARDIOLOGY | Facility: CLINIC | Age: 48
End: 2023-07-17
Attending: PHYSICIAN ASSISTANT
Payer: OTHER GOVERNMENT

## 2023-07-17 VITALS
DIASTOLIC BLOOD PRESSURE: 86 MMHG | SYSTOLIC BLOOD PRESSURE: 140 MMHG | WEIGHT: 208.2 LBS | HEART RATE: 76 BPM | HEIGHT: 71 IN | BODY MASS INDEX: 29.15 KG/M2

## 2023-07-17 DIAGNOSIS — Z82.49 FAMILY HISTORY OF CORONARY ARTERY DISEASE OCCURRING PRIOR TO 55 YEARS OF AGE: ICD-10-CM

## 2023-07-17 DIAGNOSIS — I10 ESSENTIAL HYPERTENSION: ICD-10-CM

## 2023-07-17 DIAGNOSIS — R07.9 CHEST PAIN, UNSPECIFIED TYPE: ICD-10-CM

## 2023-07-17 DIAGNOSIS — G47.33 OSA (OBSTRUCTIVE SLEEP APNEA): ICD-10-CM

## 2023-07-17 PROBLEM — R07.2 PRECORDIAL PAIN: Status: ACTIVE | Noted: 2023-07-08

## 2023-07-17 PROCEDURE — 99204 OFFICE O/P NEW MOD 45 MIN: CPT | Performed by: INTERNAL MEDICINE

## 2023-07-17 PROCEDURE — 76770 US EXAM ABDO BACK WALL COMP: CPT

## 2023-07-17 RX ORDER — OLMESARTAN MEDOXOMIL 40 MG/1
40 TABLET ORAL DAILY
Qty: 90 TABLET | Refills: 3 | Status: SHIPPED | OUTPATIENT
Start: 2023-07-17 | End: 2024-06-18

## 2023-07-17 NOTE — PROGRESS NOTES
Service Date: 07/17/2023    REFERRING PHYSICIAN:  Bubba Powers MD    HISTORY OF PRESENT ILLNESS:  It was my pleasure to see your patient, Sunny Hayes, in consultation for chest discomfort and essential hypertension.  This is a patient who was seen only last week in the hospital when he was admitted with chest discomfort.  The discomfort was described as a central type chest discomfort which radiated up to the left shoulder.  For about a week beforehand, he would notice when he exert himself he would have the chest discomfort.  He was then at [facility] and he had the discomfort for approximately 4 hours and then he decided to come to the Emergency Room.  His troponin, despite the fact that he had the discomfort continuously, was entirely normal with both high sensitivity troponins being 7.  The 12-lead electrocardiogram at the time that he was seen in the hospital was also normal with no ischemic changes noted.  I believe he was in the observation unit and then he was referred for stress echocardiography.  This was performed on 07/09.  The patient exercised for 11 minutes 16 seconds on the Douglas protocol.  He had a high workload.  He had a borderline hypertensive response to exercise.  He had no inducible angina pectoris.  His EKG was normal with no evidence of ischemia and the echo portion was also normal with no evidence of ischemia.  His Fry score was also suggested low risk.  The patient has had no chest discomfort since that time.    He has a strong family history of coronary artery disease. At 55 years of age, his father had a myocardial infarct, and according to his father, the patient's grandfather also had early atherosclerosis.  Lipid profile, which was nonfasting, showed that his LDL was 99, HDL 50 and his triglycerides were raised at 180.  However, the triglyceride level has significant caveat in that the patient was not fasting, so we really cannot interpret that. Total cholesterol was 185.    This  patient has significant hypertension, which is difficult to control.  At present, he is on maximum dose losartan 100 mg per day and chlorthalidone 12.5 mg per day.  His blood pressure, however, is still raised on this.  Blood pressure today, for instance, was 140/86, but frequently, his blood pressure will be in the low 150s to high 140s.  He did have a renal ultrasound performed. Renal ultrasound did not show evidence of renal artery stenosis, but he does have evidence of some renal cysts.    This patient also may have obstructive sleep apnea.  His wife tells him that he does appear to obstruct and he does snore.  He does have an appointment for a sleep study, but it is not until October and I am hoping that we can have him seen sooner than that.  Obstructive sleep apnea makes the treatment of hypertension resistant.    IMPRESSION:    1.  Chest discomfort.  This sounds noncardiac and there is significant evidence for this to be noncardiac given that the stress echo was normal at a relatively high workload and that he had no evidence of EKG changes nor troponin rise despite the fact that he had 4 hours of chest discomfort.  2.  Strong family history of coronary artery disease as described above.  3.  Essential hypertension.  His blood pressure does not appear to be well controlled.  He tells me there is a strong family history of essential hypertension in his family.  No evidence of renal artery stenosis.  4.  Renal cysts on renal ultrasound.  5.  Very likely has obstructive sleep apnea.  His father also had untreated obstructive sleep apnea, which he believes actually led to his father having severe heart failure.    PLAN:    1.  I will switch the losartan which is a first generation and relatively weak angiotensin receptor blocker to a third generation long-acting powerful angiotensin receptor blocker, which is olmesartan 40 mg per day.  Clearly, the losartan is not controlling his blood pressure.  2.  I have  ordered a consultation for a sleep study, and hopefully, we can get him in sooner than October.  3.  Given the patient's quite malignant family history, I will have the patient undergo a calcium score to determine if this patient does have coronary artery disease.  The calcium score will tell us 2 things.  First, it will tell us if the patient has coronary artery disease since any calcification is coronary artery disease, and second, it will give us a very good indicator of risk for future cardiac events.  We will have the patient return in 1 month's time with the results of our investigations.    It is my pleasure to be involved in the care of this very nice patient, but as always, he has been told to contact me if he has any questions or any concerns.    Judson Yip MD    cc:  Bubba Powers MD  [facility]  [address] [place]  [address]    Nan Luz PA-C  [facility]  [address] [place]  [address]    Judson Ward MD, [facility]        D: 2023   T: 2023   MT: [email or web address]    Name:     BOBBY RODRIGUES  MRN:      [id number]        Account:      [id number]   :      1975           Service Date: 2023       Document: [id number]

## 2023-07-17 NOTE — PROGRESS NOTES
HPI and Plan:   See dictation          Orders Placed This Encounter   Procedures     Sleep Study (referral)     Follow-Up with Cardiology [facility]       Orders Placed This Encounter   Medications     olmesartan (BENICAR) 40 MG tablet     Sig: Take 1 tablet (40 mg) by mouth daily     Dispense:  90 tablet     Refill:  3       Medications Discontinued During This Encounter   Medication Reason     losartan (COZAAR) 100 MG tablet Alternate therapy         Encounter Diagnoses   Name Primary?     Chest pain, unspecified type      Essential hypertension      GUCCI (obstructive sleep apnea)      Family history of coronary artery disease occurring prior to 55 years of age        CURRENT MEDICATIONS:  Current Outpatient Medications   Medication Sig Dispense Refill     albuterol (PROAIR HFA/PROVENTIL HFA/VENTOLIN HFA) 108 (90 Base) MCG/ACT inhaler Inhale 2 puffs into the lungs every 6 hours as needed for shortness of breath, wheezing or cough 18 g 3     buPROPion (WELLBUTRIN SR) 150 MG 12 hr tablet Take 150 mg by mouth daily       chlorthalidone (HYGROTON) 25 MG tablet Take 0.5 tablets (12.5 mg) by mouth daily 45 tablet 0     esomeprazole (NEXIUM) 40 MG DR capsule Take 1 capsule (40 mg) by mouth every morning (before breakfast) 90 capsule 3     fluticasone-salmeterol (ADVAIR) 500-50 MCG/ACT inhaler Inhale 1 puff into the lungs every 12 hours 60 each 3     levothyroxine (SYNTHROID/LEVOTHROID) 175 MCG tablet Take 1 tablet (175 mcg) by mouth daily 90 tablet 3     olmesartan (BENICAR) 40 MG tablet Take 1 tablet (40 mg) by mouth daily 90 tablet 3     zolpidem ER (AMBIEN CR) 6.25 MG CR tablet Take 6.25 mg by mouth At Bedtime         ALLERGIES     Allergies   Allergen Reactions     Ace Inhibitors Unknown     Bicillin C-R, Unknown     Penicillins Difficulty breathing, Hives, Itching and Swelling     rash         PAST MEDICAL HISTORY:  Past Medical History:   Diagnosis Date     Gastroesophageal reflux disease      Hypertension       Hypothyroidism      Uncomplicated asthma        PAST SURGICAL HISTORY:  No past surgical history on file.    FAMILY HISTORY:  History reviewed. No pertinent family history.    SOCIAL HISTORY:  Social History     Socioeconomic History     Marital status:      Spouse name: None     Number of children: None     Years of education: None     Highest education level: None   Tobacco Use     Smoking status: Former     Types: Cigarettes     Quit date: 2003     Years since quittin.5     Smokeless tobacco: Former     Quit date: 2010   Vaping Use     Vaping Use: Never used   Substance and Sexual Activity     Alcohol use: Yes     Comment: 1-2 drinks per week     Drug use: Never     Sexual activity: Yes     Partners: Female     Social Determinants of Health     Financial Resource Strain: Low Risk  (2023)    Overall Financial Resource Strain (CARDIA)      Difficulty of Paying Living Expenses: Not hard at all   Food Insecurity: No Food Insecurity (2023)    Hunger Vital Sign      Worried About Running Out of Food in the Last Year: Never true      Ran Out of Food in the Last Year: Never true   Transportation Needs: No Transportation Needs (2023)    PRAPARE - Transportation      Lack of Transportation (Medical): No      Lack of Transportation (Non-Medical): No   Physical Activity: Insufficiently Active (2023)    Exercise Vital Sign      Days of Exercise per Week: 4 days      Minutes of Exercise per Session: 30 min   Stress: Stress Concern Present (2023)    [employer] - Occupational Stress Questionnaire      Feeling of Stress : Rather much   Social Connections: Moderately Isolated (2023)    Social Connection and Isolation Panel [NHANES]      Frequency of Communication with Friends and Family: More than three times a week      Frequency of Social Gatherings with Friends and Family: Never      Attends Jain Services: Never      Active Member of Clubs  "or Organizations: No      Marital Status:    Housing Stability: Low Risk  (1/12/2023)    Housing Stability Vital Sign      Unable to Pay for Housing in the Last Year: No      Number of Places Lived in the Last Year: 1      Unstable Housing in the Last Year: No       Review of Systems:  Skin:  not assessed       Eyes:  not assessed      ENT:  not assessed      Respiratory:  Positive for shortness of breath asthma - dyspnea with poor air quality recently   Cardiovascular:    Positive for;chest pain;heaviness;lightheadedness;dizziness;edema;fatigue edema of the ankles at the end of the day  Gastroenterology: not assessed      Genitourinary:  not assessed      Musculoskeletal:  not assessed      Neurologic:  not assessed      Psychiatric:  not assessed      Heme/Lymph/Imm:  not assessed      Endocrine:  not assessed        Physical Exam:  Vitals: BP (!) 140/86   Pulse 76   Ht 1.797 m (5' 10.75\")   Wt 94.4 kg (208 lb 3.2 oz)   BMI 29.24 kg/m      Constitutional:  cooperative, alert and oriented, well developed, well nourished, in no acute distress        Skin:  warm and dry to the touch          Head:  normocephalic        Eyes:  pupils equal and round        Lymph:      ENT:  no pallor or cyanosis        Neck:  carotid pulses are full and equal bilaterally;JVP normal        Respiratory:  normal breath sounds, clear to auscultation, normal A-P diameter, normal symmetry, normal respiratory excursion, no use of accessory muscles         Cardiac: regular rhythm, normal S1/S2, no S3 or S4, apical impulse not displaced, no murmurs, gallops or rubs                pulses full and equal                                        GI:           Extremities and Muscular Skeletal:  no deformities, clubbing, cyanosis, erythema observed;no edema              Neurological:  no gross motor deficits;affect appropriate        Psych:  Alert and Oriented x 3        CC  Nan Luz PA-C  [address]  [place],  " [place] [address]

## 2023-07-17 NOTE — LETTER
7/17/2023    Bubba Powers MD  [id number]  [facility] [address]    RE: Sunny Hayes       Dear Colleague,     I had the pleasure of seeing Sunny Hayes in the [facility].  HPI and Plan:   See dictation          Orders Placed This Encounter   Procedures    Sleep Study (referral)    Follow-Up with Cardiology [facility]       Orders Placed This Encounter   Medications    olmesartan (BENICAR) 40 MG tablet     Sig: Take 1 tablet (40 mg) by mouth daily     Dispense:  90 tablet     Refill:  3       Medications Discontinued During This Encounter   Medication Reason    losartan (COZAAR) 100 MG tablet Alternate therapy         Encounter Diagnoses   Name Primary?    Chest pain, unspecified type     Essential hypertension     GUCCI (obstructive sleep apnea)     Family history of coronary artery disease occurring prior to 55 years of age        CURRENT MEDICATIONS:  Current Outpatient Medications   Medication Sig Dispense Refill    albuterol (PROAIR HFA/PROVENTIL HFA/VENTOLIN HFA) 108 (90 Base) MCG/ACT inhaler Inhale 2 puffs into the lungs every 6 hours as needed for shortness of breath, wheezing or cough 18 g 3    buPROPion (WELLBUTRIN SR) 150 MG 12 hr tablet Take 150 mg by mouth daily      chlorthalidone (HYGROTON) 25 MG tablet Take 0.5 tablets (12.5 mg) by mouth daily 45 tablet 0    esomeprazole (NEXIUM) 40 MG DR capsule Take 1 capsule (40 mg) by mouth every morning (before breakfast) 90 capsule 3    fluticasone-salmeterol (ADVAIR) 500-50 MCG/ACT inhaler Inhale 1 puff into the lungs every 12 hours 60 each 3    levothyroxine (SYNTHROID/LEVOTHROID) 175 MCG tablet Take 1 tablet (175 mcg) by mouth daily 90 tablet 3    olmesartan (BENICAR) 40 MG tablet Take 1 tablet (40 mg) by mouth daily 90 tablet 3    zolpidem ER (AMBIEN CR) 6.25 MG CR tablet Take 6.25 mg by mouth At Bedtime         ALLERGIES     Allergies   Allergen Reactions    Ace Inhibitors Unknown    Bicillin C-R, Unknown    Penicillins Difficulty  breathing, Hives, Itching and Swelling     rash         PAST MEDICAL HISTORY:  Past Medical History:   Diagnosis Date    Gastroesophageal reflux disease     Hypertension     Hypothyroidism     Uncomplicated asthma        PAST SURGICAL HISTORY:  No past surgical history on file.    FAMILY HISTORY:  History reviewed. No pertinent family history.    SOCIAL HISTORY:  Social History     Socioeconomic History    Marital status:      Spouse name: None    Number of children: None    Years of education: None    Highest education level: None   Tobacco Use    Smoking status: Former     Types: Cigarettes     Quit date: 2003     Years since quittin.5    Smokeless tobacco: Former     Quit date: 2010   Vaping Use    Vaping Use: Never used   Substance and Sexual Activity    Alcohol use: Yes     Comment: 1-2 drinks per week    Drug use: Never    Sexual activity: Yes     Partners: Female     Social Determinants of Health     Financial Resource Strain: Low Risk  (2023)    Overall Financial Resource Strain (CARDIA)     Difficulty of Paying Living Expenses: Not hard at all   Food Insecurity: No Food Insecurity (2023)    Hunger Vital Sign     Worried About Running Out of Food in the Last Year: Never true     Ran Out of Food in the Last Year: Never true   Transportation Needs: No Transportation Needs (2023)    PRAPARE - Transportation     Lack of Transportation (Medical): No     Lack of Transportation (Non-Medical): No   Physical Activity: Insufficiently Active (2023)    Exercise Vital Sign     Days of Exercise per Week: 4 days     Minutes of Exercise per Session: 30 min   Stress: Stress Concern Present (2023)    [employer] - Occupational Stress Questionnaire     Feeling of Stress : Rather much   Social Connections: Moderately Isolated (2023)    Social Connection and Isolation Panel [NHANES]     Frequency of Communication with Friends and Family: More than  "three times a week     Frequency of Social Gatherings with Friends and Family: Never     Attends Islam Services: Never     Active Member of Clubs or Organizations: No     Marital Status:    Housing Stability: Low Risk  (1/12/2023)    Housing Stability Vital Sign     Unable to Pay for Housing in the Last Year: No     Number of Places Lived in the Last Year: 1     Unstable Housing in the Last Year: No       Review of Systems:  Skin:  not assessed       Eyes:  not assessed      ENT:  not assessed      Respiratory:  Positive for shortness of breath asthma - dyspnea with poor air quality recently   Cardiovascular:    Positive for;chest pain;heaviness;lightheadedness;dizziness;edema;fatigue edema of the ankles at the end of the day  Gastroenterology: not assessed      Genitourinary:  not assessed      Musculoskeletal:  not assessed      Neurologic:  not assessed      Psychiatric:  not assessed      Heme/Lymph/Imm:  not assessed      Endocrine:  not assessed        Physical Exam:  Vitals: BP (!) 140/86   Pulse 76   Ht 1.797 m (5' 10.75\")   Wt 94.4 kg (208 lb 3.2 oz)   BMI 29.24 kg/m      Constitutional:  cooperative, alert and oriented, well developed, well nourished, in no acute distress        Skin:  warm and dry to the touch          Head:  normocephalic        Eyes:  pupils equal and round        Lymph:      ENT:  no pallor or cyanosis        Neck:  carotid pulses are full and equal bilaterally;JVP normal        Respiratory:  normal breath sounds, clear to auscultation, normal A-P diameter, normal symmetry, normal respiratory excursion, no use of accessory muscles         Cardiac: regular rhythm, normal S1/S2, no S3 or S4, apical impulse not displaced, no murmurs, gallops or rubs                pulses full and equal                                        GI:           Extremities and Muscular Skeletal:  no deformities, clubbing, cyanosis, erythema observed;no edema              Neurological:  no gross " motor deficits;affect appropriate        Psych:  Alert and Oriented x 3        CC  Nan Luz PA-C  [address] [place] [address]                Service Date: 07/17/2023    REFERRING PHYSICIAN:  Bubba Powers MD    HISTORY OF PRESENT ILLNESS:  It was my pleasure to see your patient, Sunny Hayes, in consultation for chest discomfort and essential hypertension.  This is a patient who was seen only last week in the hospital when he was admitted with chest discomfort.  The discomfort was described as a central type chest discomfort which radiated up to the left shoulder.  For about a week beforehand, he would notice when he exert himself he would have the chest discomfort.  He was then at [facility] and he had the discomfort for approximately 4 hours and then he decided to come to the Emergency Room.  His troponin, despite the fact that he had the discomfort continuously, was entirely normal with both high sensitivity troponins being 7.  The 12-lead electrocardiogram at the time that he was seen in the hospital was also normal with no ischemic changes noted.  I believe he was in the observation unit and then he was referred for stress echocardiography.  This was performed on 07/09.  The patient exercised for 11 minutes 16 seconds on the Douglas protocol.  He had a high workload.  He had a borderline hypertensive response to exercise.  He had no inducible angina pectoris.  His EKG was normal with no evidence of ischemia and the echo portion was also normal with no evidence of ischemia.  His Fry score was also suggested low risk.  The patient has had no chest discomfort since that time.    He has a strong family history of coronary artery disease. At 55 years of age, his father had a myocardial infarct, and according to his father, the patient's grandfather also had early atherosclerosis.  Lipid profile, which was nonfasting, showed that his LDL was 99, HDL 50 and his triglycerides were raised  at 180.  However, the triglyceride level has significant caveat in that the patient was not fasting, so we really cannot interpret that. Total cholesterol was 185.    This patient has significant hypertension, which is difficult to control.  At present, he is on maximum dose losartan 100 mg per day and chlorthalidone 12.5 mg per day.  His blood pressure, however, is still raised on this.  Blood pressure today, for instance, was 140/86, but frequently, his blood pressure will be in the low 150s to high 140s.  He did have a renal ultrasound performed. Renal ultrasound did not show evidence of renal artery stenosis, but he does have evidence of some renal cysts.    This patient also may have obstructive sleep apnea.  His wife tells him that he does appear to obstruct and he does snore.  He does have an appointment for a sleep study, but it is not until October and I am hoping that we can have him seen sooner than that.  Obstructive sleep apnea makes the treatment of hypertension resistant.    IMPRESSION:    1.  Chest discomfort.  This sounds noncardiac and there is significant evidence for this to be noncardiac given that the stress echo was normal at a relatively high workload and that he had no evidence of EKG changes nor troponin rise despite the fact that he had 4 hours of chest discomfort.  2.  Strong family history of coronary artery disease as described above.  3.  Essential hypertension.  His blood pressure does not appear to be well controlled.  He tells me there is a strong family history of essential hypertension in his family.  No evidence of renal artery stenosis.  4.  Renal cysts on renal ultrasound.  5.  Very likely has obstructive sleep apnea.  His father also had untreated obstructive sleep apnea, which he believes actually led to his father having severe heart failure.    PLAN:    1.  I will switch the losartan which is a first generation and relatively weak angiotensin receptor blocker to a third  generation long-acting powerful angiotensin receptor blocker, which is olmesartan 40 mg per day.  Clearly, the losartan is not controlling his blood pressure.  2.  I have ordered a consultation for a sleep study, and hopefully, we can get him in sooner than October.  3.  Given the patient's quite malignant family history, I will have the patient undergo a calcium score to determine if this patient does have coronary artery disease.  The calcium score will tell us 2 things.  First, it will tell us if the patient has coronary artery disease since any calcification is coronary artery disease, and second, it will give us a very good indicator of risk for future cardiac events.  We will have the patient return in 1 month's time with the results of our investigations.    It is my pleasure to be involved in the care of this very nice patient, but as always, he has been told to contact me if he has any questions or any concerns.    Judson Yip MD    cc:  Bubba Powers MD  [facility]  [id number] [place] [address]  [id number]    Nan Luz PA-C  [place] [address] [place]  [address]    Judson Ward MD, [facility]        D: 2023   T: 2023   MT: [email or web address]    Name:     BOBBY RODRIGUES  MRN:      [id number]        Account:      [id number]   :      1975           Service Date: 2023       Document: [id number]     Thank you for allowing me to participate in the care of your patient.      Sincerely,     Judson Yip MD, MD     [facility]  cc:   Nan Luz PA-C  [address] [place] [address]

## 2023-07-20 ENCOUNTER — OFFICE VISIT (OUTPATIENT)
Dept: DERMATOLOGY | Facility: CLINIC | Age: 48
End: 2023-07-20
Payer: OTHER GOVERNMENT

## 2023-07-20 DIAGNOSIS — D23.9 DERMAL NEVUS: ICD-10-CM

## 2023-07-20 DIAGNOSIS — L82.1 SEBORRHEIC KERATOSES: Primary | ICD-10-CM

## 2023-07-20 DIAGNOSIS — L81.4 LENTIGO: ICD-10-CM

## 2023-07-20 DIAGNOSIS — D18.01 ANGIOMA OF SKIN: ICD-10-CM

## 2023-07-20 PROCEDURE — 99203 OFFICE O/P NEW LOW 30 MIN: CPT | Performed by: DERMATOLOGY

## 2023-07-20 NOTE — PROGRESS NOTES
Sunny Hayes , a 48 year old year old male patient, I was asked to see by Dr. Powers for spot on skin.  Patient states this has been present for one year.  Patient reports the following symptoms:  none .  Patient reports the following previous treatments none.  Patient reports the following modifying factors none.  Associated symptoms: none.  Patient has no other skin complaints today.  Remainder of the HPI, Meds, PMH, Allergies, FH, and SH was reviewed in chart.      Past Medical History:   Diagnosis Date    Gastroesophageal reflux disease     Hypertension     Hypothyroidism     Uncomplicated asthma        No past surgical history on file.     No family history on file.    Social History     Socioeconomic History    Marital status:      Spouse name: Not on file    Number of children: Not on file    Years of education: Not on file    Highest education level: Not on file   Occupational History    Not on file   Tobacco Use    Smoking status: Former     Types: Cigarettes     Quit date: 2003     Years since quittin.5    Smokeless tobacco: Former     Quit date: 2010   Vaping Use    Vaping Use: Never used   Substance and Sexual Activity    Alcohol use: Yes     Comment: 1-2 drinks per week    Drug use: Never    Sexual activity: Yes     Partners: Female   Other Topics Concern    Not on file   Social History Narrative    Not on file     Social Determinants of Health     Financial Resource Strain: Low Risk  (2023)    Overall Financial Resource Strain (CARDIA)     Difficulty of Paying Living Expenses: Not hard at all   Food Insecurity: No Food Insecurity (2023)    Hunger Vital Sign     Worried About Running Out of Food in the Last Year: Never true     Ran Out of Food in the Last Year: Never true   Transportation Needs: No Transportation Needs (2023)    PRAPARE - Transportation     Lack of Transportation (Medical): No     Lack of Transportation (Non-Medical): No   Physical Activity:  Insufficiently Active (1/12/2023)    Exercise Vital Sign     Days of Exercise per Week: 4 days     Minutes of Exercise per Session: 30 min   Stress: Stress Concern Present (1/12/2023)    [employer] - Occupational Stress Questionnaire     Feeling of Stress : Rather much   Social Connections: Moderately Isolated (1/12/2023)    Social Connection and Isolation Panel [NHANES]     Frequency of Communication with Friends and Family: More than three times a week     Frequency of Social Gatherings with Friends and Family: Never     Attends Cheondoism Services: Never     Active Member of Clubs or Organizations: No     Attends Club or Organization Meetings: Not on file     Marital Status:    Intimate Partner Violence: Not on file   Housing Stability: Low Risk  (1/12/2023)    Housing Stability Vital Sign     Unable to Pay for Housing in the Last Year: No     Number of Places Lived in the Last Year: 1     Unstable Housing in the Last Year: No       Outpatient Encounter Medications as of 7/20/2023   Medication Sig Dispense Refill    albuterol (PROAIR HFA/PROVENTIL HFA/VENTOLIN HFA) 108 (90 Base) MCG/ACT inhaler Inhale 2 puffs into the lungs every 6 hours as needed for shortness of breath, wheezing or cough 18 g 3    buPROPion (WELLBUTRIN SR) 150 MG 12 hr tablet Take 150 mg by mouth daily      chlorthalidone (HYGROTON) 25 MG tablet Take 0.5 tablets (12.5 mg) by mouth daily 45 tablet 0    esomeprazole (NEXIUM) 40 MG DR capsule Take 1 capsule (40 mg) by mouth every morning (before breakfast) 90 capsule 3    fluticasone-salmeterol (ADVAIR) 500-50 MCG/ACT inhaler Inhale 1 puff into the lungs every 12 hours 60 each 3    levothyroxine (SYNTHROID/LEVOTHROID) 175 MCG tablet Take 1 tablet (175 mcg) by mouth daily 90 tablet 3    olmesartan (BENICAR) 40 MG tablet Take 1 tablet (40 mg) by mouth daily 90 tablet 3    zolpidem ER (AMBIEN CR) 6.25 MG CR tablet Take 6.25 mg by mouth At Bedtime       No  facility-administered encounter medications on file as of 7/20/2023.             Review Of Systems  Skin: As above  Eyes: negative  Ears/Nose/Throat: negative  Respiratory: No shortness of breath, dyspnea on exertion, cough, or hemoptysis  Cardiovascular: negative  Gastrointestinal: negative  Genitourinary: negative  Musculoskeletal: negative  Neurologic: negative  Psychiatric: negative  Hematologic/Lymphatic/Immunologic: negative  Endocrine: negative      O:   NAD, WDWN, Alert & Oriented, Mood & Affect wnl, Vitals stable   Here today alone   General appearance viet ii   Vitals stable   Alert, oriented and in no acute distress   R trunk stuck on papule   Stuck on papules and brown macules on trunk and ext    Red papules on trunk   Flesh colored papules on trunk          Eyes: Conjunctivae/lids:Normal     ENT: Lips, buccal mucosa, tongue: normal    MSK:Normal    Cardiovascular: peripheral edema none    Pulm: Breathing Normal    Neuro/Psych: Orientation:Normal; Mood/Affect:Normal      A/P:  1. Seborrheic keratosis, lentigo, angioma, dermal nevus  It was a pleasure speaking to Sunny Hayes today.  Previous clinic  notes and pertinent laboratory tests were reviewed prior to Sunny Hayes's visit.  Nature and genetics of benign skin lesions dicussed with patient.  Signs and Symptoms of skin cancer discussed with patient.  Patient encouraged to perform monthly skin exams.  UV precautions reviewed with patient.  Return to clinic 12 months

## 2023-07-20 NOTE — LETTER
2023         RE: Sunny Hayes  [address] [facility] [id number]        Dear Colleague,    Thank you for referring your patient, Sunny Hayes, to the [facility]. Please see a copy of my visit note below.    Sunny Hayes , a 48 year old year old male patient, I was asked to see by Dr. Powers for spot on skin.  Patient states this has been present for one year.  Patient reports the following symptoms:  none .  Patient reports the following previous treatments none.  Patient reports the following modifying factors none.  Associated symptoms: none.  Patient has no other skin complaints today.  Remainder of the HPI, Meds, PMH, Allergies, FH, and SH was reviewed in chart.      Past Medical History:   Diagnosis Date     Gastroesophageal reflux disease      Hypertension      Hypothyroidism      Uncomplicated asthma        No past surgical history on file.     No family history on file.    Social History     Socioeconomic History     Marital status:      Spouse name: Not on file     Number of children: Not on file     Years of education: Not on file     Highest education level: Not on file   Occupational History     Not on file   Tobacco Use     Smoking status: Former     Types: Cigarettes     Quit date: 2003     Years since quittin.5     Smokeless tobacco: Former     Quit date: 2010   Vaping Use     Vaping Use: Never used   Substance and Sexual Activity     Alcohol use: Yes     Comment: 1-2 drinks per week     Drug use: Never     Sexual activity: Yes     Partners: Female   Other Topics Concern     Not on file   Social History Narrative     Not on file     Social Determinants of Health     Financial Resource Strain: Low Risk  (2023)    Overall Financial Resource Strain (CARDIA)      Difficulty of Paying Living Expenses: Not hard at all   Food Insecurity: No Food Insecurity (2023)    Hunger Vital Sign      Worried About Running Out of  Food in the Last Year: Never true      Ran Out of Food in the Last Year: Never true   Transportation Needs: No Transportation Needs (1/12/2023)    PRAPARE - Transportation      Lack of Transportation (Medical): No      Lack of Transportation (Non-Medical): No   Physical Activity: Insufficiently Active (1/12/2023)    Exercise Vital Sign      Days of Exercise per Week: 4 days      Minutes of Exercise per Session: 30 min   Stress: Stress Concern Present (1/12/2023)    [employer] [place] [employer] - Occupational Stress Questionnaire      Feeling of Stress : Rather much   Social Connections: Moderately Isolated (1/12/2023)    Social Connection and Isolation Panel [NHANES]      Frequency of Communication with Friends and Family: More than three times a week      Frequency of Social Gatherings with Friends and Family: Never      Attends [employer] Services: Never      Active Member of Clubs or Organizations: No      Attends Club or Organization Meetings: Not on file      Marital Status:    Intimate Partner Violence: Not on file   Housing Stability: Low Risk  (1/12/2023)    Housing Stability Vital Sign      Unable to Pay for Housing in the Last Year: No      Number of Places Lived in the Last Year: 1      Unstable Housing in the Last Year: No       Outpatient Encounter Medications as of 7/20/2023   Medication Sig Dispense Refill     albuterol (PROAIR HFA/PROVENTIL HFA/VENTOLIN HFA) 108 (90 Base) MCG/ACT inhaler Inhale 2 puffs into the lungs every 6 hours as needed for shortness of breath, wheezing or cough 18 g 3     buPROPion (WELLBUTRIN SR) 150 MG 12 hr tablet Take 150 mg by mouth daily       chlorthalidone (HYGROTON) 25 MG tablet Take 0.5 tablets (12.5 mg) by mouth daily 45 tablet 0     esomeprazole (NEXIUM) 40 MG DR capsule Take 1 capsule (40 mg) by mouth every morning (before breakfast) 90 capsule 3     fluticasone-salmeterol (ADVAIR) 500-50 MCG/ACT inhaler Inhale 1 puff into the lungs every 12 hours  60 each 3     levothyroxine (SYNTHROID/LEVOTHROID) 175 MCG tablet Take 1 tablet (175 mcg) by mouth daily 90 tablet 3     olmesartan (BENICAR) 40 MG tablet Take 1 tablet (40 mg) by mouth daily 90 tablet 3     zolpidem ER (AMBIEN CR) 6.25 MG CR tablet Take 6.25 mg by mouth At Bedtime       No facility-administered encounter medications on file as of 7/20/2023.             Review Of Systems  Skin: As above  Eyes: negative  Ears/Nose/Throat: negative  Respiratory: No shortness of breath, dyspnea on exertion, cough, or hemoptysis  Cardiovascular: negative  Gastrointestinal: negative  Genitourinary: negative  Musculoskeletal: negative  Neurologic: negative  Psychiatric: negative  Hematologic/Lymphatic/Immunologic: negative  Endocrine: negative      O:   NAD, WDWN, Alert & Oriented, Mood & Affect wnl, Vitals stable   Here today alone   General appearance viet ii   Vitals stable   Alert, oriented and in no acute distress   R trunk stuck on papule   Stuck on papules and brown macules on trunk and ext    Red papules on trunk   Flesh colored papules on trunk          Eyes: Conjunctivae/lids:Normal     ENT: Lips, buccal mucosa, tongue: normal    MSK:Normal    Cardiovascular: peripheral edema none    Pulm: Breathing Normal    Neuro/Psych: Orientation:Normal; Mood/Affect:Normal      A/P:  1. Seborrheic keratosis, lentigo, angioma, dermal nevus  It was a pleasure speaking to Sunny DAVID Freddy today.  Previous clinic  notes and pertinent laboratory tests were reviewed prior to Sunny Hayes's visit.  Nature and genetics of benign skin lesions dicussed with patient.  Signs and Symptoms of skin cancer discussed with patient.  Patient encouraged to perform monthly skin exams.  UV precautions reviewed with patient.  Return to clinic 12 months      Again, thank you for allowing me to participate in the care of your patient.        Sincerely,        Eamon Anguiano MD

## 2023-08-14 ENCOUNTER — OFFICE VISIT (OUTPATIENT)
Dept: SLEEP MEDICINE | Facility: CLINIC | Age: 48
End: 2023-08-14
Payer: OTHER GOVERNMENT

## 2023-08-14 VITALS
HEART RATE: 84 BPM | WEIGHT: 210.1 LBS | DIASTOLIC BLOOD PRESSURE: 86 MMHG | OXYGEN SATURATION: 96 % | SYSTOLIC BLOOD PRESSURE: 133 MMHG | BODY MASS INDEX: 29.51 KG/M2

## 2023-08-14 DIAGNOSIS — R06.81 WITNESSED EPISODE OF APNEA: ICD-10-CM

## 2023-08-14 DIAGNOSIS — G47.9 SLEEP DISTURBANCE: Primary | ICD-10-CM

## 2023-08-14 DIAGNOSIS — R06.83 SNORING: ICD-10-CM

## 2023-08-14 DIAGNOSIS — F51.04 CHRONIC INSOMNIA: ICD-10-CM

## 2023-08-14 DIAGNOSIS — I10 BENIGN ESSENTIAL HTN: ICD-10-CM

## 2023-08-14 DIAGNOSIS — E66.3 OVERWEIGHT (BMI 25.0-29.9): ICD-10-CM

## 2023-08-14 DIAGNOSIS — F51.3 SLEEP WALKING DISORDER: ICD-10-CM

## 2023-08-14 DIAGNOSIS — G47.52 DREAM ENACTMENT BEHAVIOR: ICD-10-CM

## 2023-08-14 DIAGNOSIS — G47.19 EXCESSIVE DAYTIME SLEEPINESS: ICD-10-CM

## 2023-08-14 DIAGNOSIS — F51.5 NIGHTMARES: ICD-10-CM

## 2023-08-14 PROCEDURE — 99204 OFFICE O/P NEW MOD 45 MIN: CPT | Performed by: INTERNAL MEDICINE

## 2023-08-14 NOTE — PROGRESS NOTES
Outpatient Sleep Medicine Consultation:      Name: Sunny Hayes MRN# [id number]   Age: 48 year old YOB: 1975     Date of Consultation: August 14, 2023  Consultation is requested by: No referring provider defined for this encounter. No ref. provider found  Primary care provider: Bubba Powers       Reason for Sleep Consult:     Sunny Hayes is sent by No ref. provider found for a sleep consultation regarding obtaining evaluation for possible sleep apnea.         Assessment and Plan:   Snoring, observed apneas during sleep , non-restorative sleep, fatigue and excessive daytime sleepiness, in the setting of  essential pretension, hypothyroidism, gastroesophageal reflux disease, Gilbert's syndrome, John's esophagus without dysplasia.   Possible obstructive sleep apnea.  STOP BANG score:5/8 (Snoring, tiredness, observed apneas during sleep, hypertension and male gender)  The other risk factors for GUCCI includes strong positive family history for GUCCI.  HST/ Polysomnography reviewed.  Recommend in-lab sleep study with 4 limb EMG/RBD protocol to evaluate for sleep disordered breathing and to check the muscle tone during REM sleep since patient reports dream enactment behavior. However due to the long waiting period  to get the in-lab sleep study scheduled, patient decided to pursue home sleep apnea testing.  Orders were generated for HST.  Plan to communicate the HST results via [employer].      --If the HST shows evidence of GUCCI and patient  will continue monitoring for abnormal sleep-related behaviors. If he  continues to have abnormal sleep-related behaviors despite adequate treatment of GUCCI, we will consider obtaining an in-lab sleep study for further evaluation for RBD.  --If the sleep study home study does not show GUCCI, will consider recommending the in-lab sleep study since home sleep apnea testing may lack sensitivity to identify mild disease.     Obstructive sleep apnea and consequences of  untreated sleep apnea were reviewed.  Information provided regarding treatment options for GUCCI.    Abnormal sleep-related behaviors: Dream enactment behavior, nightmares, sleepwalking  The dream enactment behavior could be due to possible untreated GUCCI or RBD. Patient was instructed to continue to monitor for future episodes.  We discussed briefly about RBD and association of RBD with neurodegenerative conditions such as Parkinson's disease.  We discussed safety measures of sleep/bed environment to prevent trauma to self/partner:  Keeping padding by the side of the bed   Keeping lower mattress to the floor and/or use a ground-floor bedroom  Secure doors and windows (locks, alarms, barriers)  Remove sharp and dangerous objects from bedroom, including firearms      Nightmares-likely related to trauma, possible untreated GUCCI: Referral to sleep psychology provided to obtain trauma-focused Cognitive Behavioral Therapy/Image Rehearsal Therapy.    Chronic Insomnia -multifactorial.  Psychophysiological,  anxiety/depression, possible GUCCI, nightmares  We discussed stimulus control measures.  Sleep hygiene: Encouraged to follow good sleep hygiene measures including avoiding reading in bed  Psychophysiologic insomnia: We discussed cognitive behavioral therapy for insomnia.  Referral provided to sleep psychology for CBT-I and for nightmares.    Anxiety/depression: We discussed the association of insomnia with anxiety and depression. Recommended the patient to  follow-up with his primary care provider for optimizing the management of anxiety and depression.  Possible GUCCI: Will obtain sleep study as documented above.     Hypertension:We discussed the association of GUCCI with hypertension and other cardiovascular diseases including stroke.     Overweight: We discussed weight management with healthy diet, and exercise.     Patient was strongly advised to avoid driving, operating any heavy machinery or other hazardous situations while  "drowsy or sleepy.  Patient was counseled on the importance of driving while alert, to pull over if drowsy, or nap before getting into the vehicle if sleepy.      Orders Placed This Encounter   Procedures    Comprehensive Sleep Study    HST-Home Sleep Apnea Test - Noxturnal Returnable    Sleep Psychology  Referral       Summary Counseling:    Sleep Testing Reviewed  Obstructive Sleep Apnea Reviewed  Complications of Untreated Sleep Apnea Reviewed    Medical Decision-making:   Educational materials provided in instructions    CC: No ref. provider found      The above note was dictated using voice recognition software. Although reviewed after completion, some word and grammatical error may remain . Please contact the author for any clarifications.     \" Total time spent was 47 minutes for this appointment on this date of service which include time spent before, during and after the visit for chart review, patient care, counseling and coordination of care including documentation.\"      Sigrid Storm MD  [facility]  [address] , [place] [address]             History of Present Illness:     Past Sleep Evaluations: none    Patient reports snoring, observed apneas during sleep and reports of gasping for air during sleep according to his wife.  He also reports morning headaches, dry mouth in the morning and stuffy nose when he wakes up.  During weekdays and weekends, he goes to bed at 9 PM.  It takes 20 to 30 minutes to fall asleep.  He reports difficulty falling asleep 1-2 times a week and it is usually stress related(his 8-year-old child has autism and that has been stressful).  He reports frequent nighttime awakenings- on a good night he may wake up 4-5 times and on a bad night, he reports up to  9-10 awakenings during the night. The awakenings are due to snoring, external stimuli or nightmares.  He reports difficulty falling back asleep following the " awakening which he attributes to active mind and anxiety. On weekdays, he finally wakes up at 5 AM with an alarm.  On weekends, he wakes up at 6 AM without an alarm.  He reports nonrestorative sleep, fatigue and excessive daytime sleepiness.  He endorses an [place] sleepiness score of 18 out of 24.  He reports occasional drowsiness while driving and reports head-bobbing while driving, but fortunately there have not been any motor vehicle accidents due to drowsiness while driving.  He reads in the bed.  He does not nap during the day and if he naps, it is usually for 10 minutes and he feels rested after waking up from the nap.  He dozes off unintentionally 4 to 5 days a week.    He denies symptoms of restless legs syndrome.    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  He reports sleepwalking which was more frequent during his childhood and infrequent as an adult-this year he reports 1 or 2 episodes of sleepwalking. He denies sleep eating. He reports sleep talking and dream enactment behavior. He reports kicking his wife, pushing her away in the bed while acting out dreams. There are no reports of significant trauma to self or partner during those episodes . He denies cataplexy or sleep paralysis.     CAFFEINE AND OTHER SUBSTANCES:    Patient consumes caffeinated beverages per day:   1-2  Last caffeine use is usually: noon   List of any prescribed or over the counter stimulants that patient takes:  none  List of any prescribed or over the counter sleep medication patient takes:  melatonin 1mg, ambien CR 6.25 mg 1-2 days/week  List of previous sleep medications that patient has tried:    Patient drinks alcohol to help them sleep:  no  Patient drinks alcohol near bedtime:  no    Family History:  Patient has a family member been diagnosed with a sleep disorder:   Mother, father, brother and sister have  "GUCCI        SCALES:    EPWORTH SLEEPINESS SCALE         8/14/2023    10:00 AM    [place] Sleepiness Scale ( CHACHA Alan  [id number]<br>ESS - [place]/English - Final version - 21 Nov 07 - [facility] [place].)   [place] Score (Sleep) 18         INSOMNIA SEVERITY INDEX (ALFONSO)           No data to display                Guidelines for Scoring/Interpretation:  Total score categories:  0-7 = No clinically significant insomnia   8-14 = Subthreshold insomnia   15-21 = Clinical insomnia (moderate severity)  22-28 = Clinical insomnia (severe)  Used via courtesy of www.[employer] Mediath.va.gov with permission from Leobardo Miller PhD., [facility]      STOP BANG 5 out of 8 (Snoring, tiredness, observed apneas during sleep, hypertension and male gender)        8/14/2023     9:12 AM   STOP BANG Questionnaire (  2008, the American Society of Anesthesiologists, Inc. [employer]   Neck Cir (cm) Clinic: 36 cm   B/P Clinic: 133/86         GAD7        5/12/2023     8:31 AM   HOLLIE-7    1. Feeling nervous, anxious, or on edge 0   2. Not being able to stop or control worrying 0   3. Worrying too much about different things 1   4. Trouble relaxing 1   5. Being so restless that it is hard to sit still 0   6. Becoming easily annoyed or irritable 0   7. Feeling afraid, as if something awful might happen 0   HOLLIE-7 Total Score 2   If you checked any problems, how difficult have they made it for you to do your work, take care of things at home, or get along with other people? Somewhat difficult         CAGE-AID         No data to display                CAGE-AID reprinted with permission from the [place] Medical Journal, HEMANT Bearden. and ZHANG Izquierdo, \"Conjoint screening questionnaires for alcohol and drug abuse\" [place] Medical Journal 94: 135-140, 1995.      PATIENT HEALTH QUESTIONNAIRE-9 (PHQ - 9)        5/12/2023     8:33 AM   PHQ-9 ([employer])   1.  Little interest or pleasure in doing things 1   2.  Feeling down, depressed, " or hopeless 2   3.  Trouble falling or staying asleep, or sleeping too much 1   4.  Feeling tired or having little energy 1   5.  Poor appetite or overeating 1   6.  Feeling bad about yourself - or that you are a failure or have let yourself or your family down 1   7.  Trouble concentrating on things, such as reading the newspaper or watching television 1   8.  Moving or speaking so slowly that other people could have noticed. Or the opposite - being so fidgety or restless that you have been moving around a lot more than usual 0   9.  Thoughts that you would be better off dead, or of hurting yourself in some way 0   PHQ-9 Total Score 8   6.  Feeling bad about yourself 1   7.  Trouble concentrating 1   8.  Moving slowly or restless 0   9.  Suicidal or self-harm thoughts 0   1.  Little interest or pleasure in doing things Several days   2.  Feeling down, depressed, or hopeless More than half the days   3.  Trouble falling or staying asleep, or sleeping too much Several days   4.  Feeling tired or having little energy Several days   5.  Poor appetite or overeating Several days   6.  Feeling bad about yourself Several days   7.  Trouble concentrating Several days   8.  Moving slowly or restless Not at all   9.  Suicidal or self-harm thoughts Not at all   PHQ-9 via [facility] TOTAL SCORE-----> 8 (Mild depression)   Difficulty at work, home, or with people Somewhat difficult       Developed by Ej Meadows, Patricia Hoyt, Anuj Silva and colleagues, with an educational ziggy from [employer]. No permission required to reproduce, translate, display or distribute.        Allergies:    Allergies   Allergen Reactions    Ace Inhibitors Unknown    Bicillin C-R, Unknown    Penicillins Difficulty breathing, Hives, Itching and Swelling     rash         Medications:    Current Outpatient Medications   Medication Sig Dispense Refill    albuterol (PROAIR HFA/PROVENTIL HFA/VENTOLIN HFA) 108 (90 Base) MCG/ACT inhaler  Inhale 2 puffs into the lungs every 6 hours as needed for shortness of breath, wheezing or cough 18 g 3    buPROPion (WELLBUTRIN SR) 150 MG 12 hr tablet Take 150 mg by mouth daily      esomeprazole (NEXIUM) 40 MG DR capsule Take 1 capsule (40 mg) by mouth every morning (before breakfast) 90 capsule 3    fluticasone-salmeterol (ADVAIR) 500-50 MCG/ACT inhaler Inhale 1 puff into the lungs every 12 hours 60 each 3    levothyroxine (SYNTHROID/LEVOTHROID) 175 MCG tablet Take 1 tablet (175 mcg) by mouth daily 90 tablet 3    olmesartan (BENICAR) 40 MG tablet Take 1 tablet (40 mg) by mouth daily 90 tablet 3    zolpidem ER (AMBIEN CR) 6.25 MG CR tablet Take 6.25 mg by mouth At Bedtime      chlorthalidone (HYGROTON) 25 MG tablet Take 0.5 tablets (12.5 mg) by mouth daily (Patient not taking: Reported on 8/14/2023) 45 tablet 0       Problem List:  Patient Active Problem List    Diagnosis Date Noted    GUCCI (obstructive sleep apnea) 07/17/2023     Priority: Medium    Family history of coronary artery disease occurring prior to 55 years of age 07/17/2023     Priority: Medium    Chest pain, unspecified type 07/08/2023     Priority: Medium    Precordial pain 07/08/2023     Priority: Medium    Gilbert's syndrome 01/20/2022     Priority: Medium    John's esophagus without dysplasia  - lifelong ppi 02/25/2021     Priority: Medium     Formatting of this note might be different from the original.  Repeat scope winter 2021      Essential hypertension 01/02/2019     Priority: Medium    Hypothyroidism 06/07/2003     Priority: Medium     Formatting of this note might be different from the original.  Hypothyroidism Acquired      Asthma 06/07/2003     Priority: Medium     Formatting of this note might be different from the original.  Asthma  NOS      Beta thalassemia - baseline Hgb 10 1975     Priority: Medium        Past Medical/Surgical History:  Past Medical History:   Diagnosis Date    Gastroesophageal reflux disease      Hypertension     Hypothyroidism     Uncomplicated asthma      No past surgical history on file.    Social History:  Social History     Socioeconomic History    Marital status:      Spouse name: Not on file    Number of children: Not on file    Years of education: Not on file    Highest education level: Not on file   Occupational History    Not on file   Tobacco Use    Smoking status: Former     Types: Cigarettes     Quit date: 2003     Years since quittin.6    Smokeless tobacco: Former     Quit date: 2010   Vaping Use    Vaping Use: Never used   Substance and Sexual Activity    Alcohol use: Yes     Comment: 1-2 drinks per week    Drug use: Never    Sexual activity: Yes     Partners: Female   Other Topics Concern    Not on file   Social History Narrative    Not on file     Social Determinants of Health     Financial Resource Strain: Low Risk  (2023)    Overall Financial Resource Strain (CARDIA)     Difficulty of Paying Living Expenses: Not hard at all   Food Insecurity: No Food Insecurity (2023)    Hunger Vital Sign     Worried About Running Out of Food in the Last Year: Never true     Ran Out of Food in the Last Year: Never true   Transportation Needs: No Transportation Needs (2023)    PRAPARE - Transportation     Lack of Transportation (Medical): No     Lack of Transportation (Non-Medical): No   Physical Activity: Insufficiently Active (2023)    Exercise Vital Sign     Days of Exercise per Week: 4 days     Minutes of Exercise per Session: 30 min   Stress: Stress Concern Present (2023)    [place] [employer] - Occupational Stress Questionnaire     Feeling of Stress : Rather much   Social Connections: Moderately Isolated (2023)    Social Connection and Isolation Panel [NHANES]     Frequency of Communication with Friends and Family: More than three times a week     Frequency of Social Gatherings with Friends and Family: Never     Attends  Restoration Services: Never     Active Member of Clubs or Organizations: No     Attends Club or Organization Meetings: Not on file     Marital Status:    Intimate Partner Violence: Not on file   Housing Stability: Low Risk  (1/12/2023)    Housing Stability Vital Sign     Unable to Pay for Housing in the Last Year: No     Number of Places Lived in the Last Year: 1     Unstable Housing in the Last Year: No           Review of Systems:  A complete review of systems reviewed by me is negative with the exeption of what has been mentioned in the history of present illness  and symptoms listed below:   Knee pain at night, difficulty breathing through nose, sore throat in the morning, shortness of breath with activity, heart racing at night, morning headaches and depressed mood. He denies suicidal ideations or thoughts of harming others.      Physical Examination:  Vitals: /86   Pulse 84   Wt 95.3 kg (210 lb 1.6 oz)   SpO2 96%   BMI 29.51 kg/m    BMI= Body mass index is 29.51 kg/m .    Neck Cir (cm): 36 cm    General: No apparent distress, appropriately groomed  Head: Normocephalic, atraumatic  Nose, mouth and throat: Patent airflow through both nares, oropharynx: Mallampati class IV; no tonsillar hypertrophy  Neck:Circumference: <16 inches  Chest: No cough, no audible wheezing, able to talk in full sentences. Clear to auscultation bilaterally, with no rales or rhonchi  Cardiovascular: Regular S1 and S2; no gallops or murmurs  Psych: coherent speech, normal rate and volume, able to articulate logical thoughts, able   to abstract reason, no tangential thoughts, no hallucinations   or delusions  His affect is normal  Neuro:  Mental status: Alert and  Oriented X 3  Speech: normal            Data: All pertinent previous laboratory data reviewed     Recent Labs   Lab Test 07/08/23  1422 06/27/23  1619    141   POTASSIUM 3.5 3.9   CHLORIDE 101 104   CO2 27 25   ANIONGAP 10 12   * 93   BUN 10.9 10.0    CR 0.85 1.00   FERNANDO 9.6 9.2       Recent Labs   Lab Test 07/08/23  1422   WBC 8.6   RBC 5.61   HGB 11.4*   HCT 36.5*   MCV 65*   MCH 20.3*   MCHC 31.2*   RDW 16.5*          Recent Labs   Lab Test 12/20/21  1058   PROTTOTAL 7.3   ALBUMIN 4.3   BILITOTAL 2.8*   ALKPHOS 56   AST 20   ALT 33       TSH   Date Value   06/27/2023 1.13 uIU/mL   08/11/2022 0.54 mU/L   12/20/2021 0.62 mU/L       No results found for: UAMP, UBARB, BENZODIAZEUR, UCANN, UCOC, OPIT, UPCP    Iron Sat Index   Date/Time Value Ref Range Status   07/08/2023 04:46 PM 24 15 - 46 % Final     Ferritin   Date/Time Value Ref Range Status   07/08/2023 04:46 PM 39 31 - 409 ng/mL Final       No results found for: PH, PHARTERIAL, PO2, [id number], SAT, PCO2, HCO3, BASEEXCESS, PENELOPE, BEB      Echocardiology: No results found for this or any previous visit (from the past 4320 hour(s)).    Chest x-ray:   XR Chest 2 Views 07/08/2023    Narrative  EXAM: XR CHEST 2 VIEWS  LOCATION: [facility]  DATE: 7/8/2023    INDICATION: exertional chest pain  COMPARISON: None.    Impression  IMPRESSION: Negative chest.      Chest CT: No results found for this or any previous visit from the past 365 days.      PFT: Most Recent [place] Pulmonary Function Testing: No results found        Sigrid Storm MD 8/14/2023

## 2023-08-14 NOTE — NURSING NOTE
"Chief Complaint   Patient presents with    Sleep Problem     Possibility of sleep apnea       Initial /86   Pulse 84   Wt 95.3 kg (210 lb 1.6 oz)   SpO2 96%   BMI 29.51 kg/m   Estimated body mass index is 29.51 kg/m  as calculated from the following:    Height as of 7/17/23: 1.797 m (5' 10.75\").    Weight as of this encounter: 95.3 kg (210 lb 1.6 oz).    Medication Reconciliation: complete    ESS 18    Neck circumference: 14 inches / 36 centimeters.    Gus Chanel CMA (AAMA)  "

## 2023-08-14 NOTE — PATIENT INSTRUCTIONS
"          MY TREATMENT INFORMATION FOR SLEEP APNEA-  Sunny Hayes    DOCTOR : Sigrid Storm MD    Am I having a sleep study at a sleep center?  --->Due to normal delays, you will be contacted within 2-4 weeks to schedule    Am I having a home sleep study?  --->Watch the video for the device you are using:    -/drop off device-   https://www.TiVo.com/watch?v=yGGFBdELGhk    Frequently asked questions:  1. What is Obstructive Sleep Apnea (GUCCI)? GUCCI is the most common type of sleep apnea. Apnea means, \"without breath.\"  Apnea is most often caused by narrowing or collapse of the upper airway as muscles relax during sleep.   Almost everyone has occasional apneas. Most people with sleep apnea have had brief interruptions at night frequently for many years.  The severity of sleep apnea is related to how frequent and severe the events are.   2. What are the consequences of GUCCI? Symptoms include: feeling sleepy during the day, snoring loudly, gasping or stopping of breathing, trouble sleeping, and occasionally morning headaches or heartburn at night.  Sleepiness can be serious and even increase the risk of falling asleep while driving. Other health consequences may include development of high blood pressure and other cardiovascular disease in persons who are susceptible. Untreated GUCCI  can contribute to heart disease, stroke and diabetes.   3. What are the treatment options? In most situations, sleep apnea is a lifelong disease that must be managed with daily therapy. Medications are not effective for sleep apnea and surgery is generally not considered until other therapies have been tried. Your treatment is your choice . Continuous Positive Airway (CPAP) works right away and is the therapy that is effective in nearly everyone. An oral device to hold your jaw forward is usually the next most reliable option. Other options include postioning devices (to keep you off your back), weight " loss, and surgery including a tongue pacing device. There is more detail about some of these options below.  4. Are my sleep studies covered by insurance? Although we will request verification of coverage, we advise you also check in advance of the study to ensure there is coverage.    Important tips for those choosing CPAP and similar devices  For new devices, sign up for device MUSA to monitor your device for your followup visits  We encourage you to utilize the [employer] musa or website (myAir web (resmed.com) ) to monitor your therapy progress and share the data with your healthcare team when you discuss your sleep apnea.                                                    Know your equipment:  CPAP is continuous positive airway pressure that prevents obstructive sleep apnea by keeping the throat from collapsing while you are sleeping. In most cases, the device is  smart  and can slowly self-adjusts if your throat collapses and keeps a record every day of how well you are treated-this information is available to you and your care team.  BPAP is bilevel positive airway pressure that keeps your throat open and also assists each breath with a pressure boost to maintain adequate breathing.  Special kinds of BPAP are used in patients who have inadequate breathing from lung or heart disease. In most cases, the device is  smart  and can slowly self-adjusts to assist breathing. Like CPAP, the device keeps a record of how well you are treated.  Your mask is your connection to the device. You get to choose what feels most comfortable and the staff will help to make sure if fits. Here: are some examples of the different masks that are available:       Key points to remember on your journey with sleep apnea:  Sleep study.  PAP devices often need to be adjusted during a sleep study to show that they are effective and adjusted right.  Good tips to remember: Try wearing just the mask during a quiet time during the day so  your body adapts to wearing it. A humidifier is recommended for comfort in most cases to prevent drying of your nose and throat. Allergy medication from your provider may help you if you are having nasal congestion.  Getting settled-in. It takes more than one night for most of us to get used to wearing a mask. Try wearing just the mask during a quiet time during the day so your body adapts to wearing it. A humidifier is recommended for comfort in most cases. Our team will work with you carefully on the first day and will be in contact within 4 days and again at 2 and 4 weeks for advice and remote device adjustments. Your therapy is evaluated by the device each day.   Use it every night. The more you are able to sleep naturally for 7-8 hours, the more likely you will have good sleep and to prevent health risks or symptoms from sleep apnea. Even if you use it 4 hours it helps. Occasionally all of us are unable to use a medical therapy, in sleep apnea, it is not dangerous to miss one night.   Communicate. Call our skilled team on the number provided on the first day if your visit for problems that make it difficult to wear the device. Over 2 out of 3 patients can learn to wear the device long-term with help from our team. Remember to call our team or your sleep providers if you are unable to wear the device as we may have other solutions for those who cannot adapt to mask CPAP therapy. It is recommended that you sleep your sleep provider within the first 3 months and yearly after that if you are not having problems.   Use it for your health. We encourage use of CPAP masks during daytime quiet periods to allow your face and brain to adapt to the sensation of CPAP so that it will be a more natural sensation to awaken to at night or during naps. This can be very useful during the first few weeks or months of adapting to CPAP though it does not help medically to wear CPAP during wakefulness and  should not be used as a  strategy just to meet guidelines.  Take care of your equipment. Make sure you clean your mask and tubing using directions every day and that your filter and mask are replaced as recommended or if they are not working.     BESIDES CPAP, WHAT OTHER THERAPIES ARE THERE?    Positioning Device  Positioning devices are generally used when sleep apnea is mild and only occurs on your back.This example shows a pillow that straps around the waist. It may be appropriate for those whose sleep study shows milder sleep apnea that occurs primarily when lying flat on one's back. Preliminary studies have shown benefit but effectiveness at home may need to be verified by a home sleep test. These devices are generally not covered by medical insurance.  Examples of devices that maintain sleeping on the back to prevent snoring and mild sleep apnea.    Belt type body positioner  http://[employer]/    Electronic reminder  http://nightshifttherapy.com/            Oral Appliance  What is oral appliance therapy?  An oral appliance device fits on your teeth at night like a retainer used after having braces. The device is made by a specialized dentist and requires several visits over 1-2 months before a manufactured device is made to fit your teeth and is adjusted to prevent your sleep apnea. Once an oral device is working properly, snoring should be improved. A home sleep test may be recommended at that time if to determine whether the sleep apnea is adequately treated.       Some things to remember:  -Oral devices are often, but not always, covered by your medical insurance. Be sure to check with your insurance provider.   -If you are referred for oral therapy, you will be given a list of specialized dentists to consider or you may choose to visit the Web site of the American Academy of Dental Sleep Medicine  -Oral devices are less likely to work if you have severe sleep apnea or are extremely overweight.     More detailed information  An  oral appliance is a small acrylic device that fits over the upper and lower teeth  (similar to a retainer or a mouth guard). This device slightly moves jaw forward, which moves the base of the tongue forward, opens the airway, improves breathing for effective treat snoring and obstructive sleep apnea in perhaps 7 out of 10 people .  The best working devices are custom-made by a dental device  after a mold is made of the teeth 1, 2, 3.  When is an oral appliance indicated?  Oral appliance therapy is recommended as a first-line treatment for patients with primary snoring, mild sleep apnea, and for patients with moderate sleep apnea who prefer appliance therapy to use of CPAP4, 5. Severity of sleep apnea is determined by sleep testing and is based on the number of respiratory events per hour of sleep.   How successful is oral appliance therapy?  The success rate of oral appliance therapy in patients with mild sleep apnea is 75-80% while in patients with moderate sleep apnea it is 50-70%. The chance of success in patients with severe sleep apnea is 40-50%. The research also shows that oral appliances have a beneficial effect on the cardiovascular health of GUCCI patients at the same magnitude as CPAP therapy7.  Oral appliances should be a second-line treatment in cases of severe sleep apnea, but if not completely successful then a combination therapy utilizing CPAP plus oral appliance therapy may be effective. Oral appliances tend to be effective in a broad range of patients although studies show that the patients who have the highest success are females, younger patients, those with milder disease, and less severe obesity. 3, 6.   Finding a dentist that practices dental sleep medicine  Specific training is available through the American Academy of Dental Sleep Medicine for dentists interested in working in the field of sleep. To find a dentist who is educated in the field of sleep and the use of oral  appliances, near you, visit the Web site of the American Academy of Dental Sleep Medicine.    References  1. Paulette, et al. Objectively measured vs self-reported compliance during oral appliance therapy for sleep-disordered breathing. Chest 2013; 144(5): 7119-8269.  2. Josie et al. Objective measurement of compliance during oral appliance therapy for sleep-disordered breathing. Thorax 2013; 68(1): 91-96.  3. Hebert et al. Mandibular advancement devices in 620 men and women with GUCCI and snoring: tolerability and predictors of treatment success. Chest 2004; 125: 9338-1542.  4. Lorene, et al. Oral appliances for snoring and GUCCI: a review. Sleep 2006; 29: 244-262.  5. Luis et al. Oral appliance treatment for GUCCI: an update. J Clin Sleep Med 2014; 10(2): 215-227.  6. Jorge et al. Predictors of OSAH treatment outcome. J Dent Res 2007; 86: 3484-5844.      Weight Loss:    Weight loss is a long-term strategy that may improve sleep apnea in some patients.    Weight management is a personal decision and the decision should be based on your interest and the potential benefits.  If you are interested in exploring weight loss strategies, the following discussion covers the impact on weight loss on sleep apnea and the approaches that may be successful.    Being overweight does not necessarily mean you will have health consequences.  Those who have BMI over 35 or over 27 with existing medical conditions carries greater risk.   Weight loss decreases severity of sleep apnea in most people with obesity. For those with mild obesity who have developed snoring with weight gain, even 15-30 pound weight loss can improve and occasionally eliminate sleep apnea.  Structured and life-long dietary and health habits are necessary to lose weight and keep healthier weight levels.     Though there may be significant health benefits from weight loss, long-term weight loss is very difficult to achieve- studies show success  with dietary management in less than 10% of people. In addition, substantial weight loss may require years of dietary control and may be difficult if patients have severe obesity. In these cases, surgical management may be considered.  Finally, older individuals who have tolerated obesity without health complications may be less likely to benefit from weight loss strategies.      [unfilled]    Surgery:    Surgery for obstructive sleep apnea is considered generally only when other therapies fail to work. Surgery may be discussed with you if you are having a difficult time tolerating CPAP and or when there is an abnormal structure that requires surgical correction.  Nose and throat surgeries often enlarge the airway to prevent collapse.  Most of these surgeries create pain for 1-2 weeks and up to half of the most common surgeries are not effective throughout life.  You should carefully discuss the benefits and drawbacks to surgery with your sleep provider and surgeon to determine if it is the best solution for you.   More information  Surgery for GUCCI is directed at areas that are responsible for narrowing or complete obstruction of the airway during sleep.  There are a wide range of procedures available to enlarge and/or stabilize the airway to prevent blockage of breathing in the three major areas where it can occur: the palate, tongue, and nasal regions.  Successful surgical treatment depends on the accurate identification of the factors responsible for obstructive sleep apnea in each person.  A personalized approach is required because there is no single treatment that works well for everyone.  Because of anatomic variation, consultation with an examination by a sleep surgeon is a critical first step in determining what surgical options are best for each patient.  In some cases, examination during sedation may be recommended in order to guide the selection of procedures.  Patients will be counseled about risks and  benefits as well as the typical recovery course after surgery. Surgery is typically not a cure for a person s GUCCI.  However, surgery will often significantly improve one s GUCCI severity (termed  success rate ).  Even in the absence of a cure, surgery will decrease the cardiovascular risk associated with OSA7; improve overall quality of life8 (sleepiness, functionality, sleep quality, etc).      Palate Procedures:  Patients with GUCCI often have narrowing of their airway in the region of their tonsils and uvula.  The goals of palate procedures are to widen the airway in this region as well as to help the tissues resist collapse.  Modern palate procedure techniques focus on tissue conservation and soft tissue rearrangement, rather than tissue removal.  Often the uvula is preserved in this procedure. Residual sleep apnea is common in patient after pharyngoplasty with an average reduction in sleep apnea events of 33%2.      Tongue Procedures:  ExamWhile patients are awake, the muscles that surround the throat are active and keep this region open for breathing. These muscles relax during sleep, allowing the tongue and other structures to collapse and block breathing.  There are several different tongue procedures available.  Selection of a tongue base procedure depends on characteristics seen on physical exam.  Generally, procedures are aimed at removing bulky tissues in this area or preventing the back of the tongue from falling back during sleep.  Success rates for tongue surgery range from 50-62%3.    Hypoglossal Nerve Stimulation:  Hypoglossal nerve stimulation has recently received approval from the [place] Food and Drug Administration for the treatment of obstructive sleep apnea.  This is based on research showing that the system was safe and effective in treating sleep apnea6.  Results showed that the median AHI score decreased 68%, from 29.3 to 9.0. This therapy uses an implant system that senses breathing  patterns and delivers mild stimulation to airway muscles, which keeps the airway open during sleep.  The system consists of three fully implanted components: a small generator (similar in size to a pacemaker), a breathing sensor, and a stimulation lead.  Using a small handheld remote, a patient turns the therapy on before bed and off upon awakening.    Candidates for this device must be greater than 18 years of age, have moderate to severe GUCCI (AHI between 15-65), BMI less than 35, have tried CPAP/oral appliance for at least 8 weeks without success, and have appropriate upper airway anatomy (determined by a sleep endoscopy performed by Dr. Sam Rodriguez).    Hypoglossal Nerve Stimulation Pathway:    The sleep surgeon s office will work with the patient through the insurance prior-authorization process (including communications and appeals).    Nasal Procedures:  Nasal obstruction can interfere with nasal breathing during the day and night.  Studies have shown that relief of nasal obstruction can improve the ability of some patients to tolerate positive airway pressure therapy for obstructive sleep apnea1.  Treatment options include medications such as nasal saline, topical corticosteroid and antihistamine sprays, and oral medications such as antihistamines or decongestants. Non-surgical treatments can include external nasal dilators for selected patients. If these are not successful by themselves, surgery can improve the nasal airway either alone or in combination with these other options.      Combination Procedures:  Combination of surgical procedures and other treatments may be recommended, particularly if patients have more than one area of narrowing or persistent positional disease.  The success rate of combination surgery ranges from 66-80%2,3.    References  Brooke REYES. The Role of the Nose in Snoring and Obstructive Sleep Apnoea: An Update.  Eur Arch Otorhinolaryngol. 2011; 268: 1365-73.   Ximena ARZATE; Ramses  JA; Romulo JR; Pallanch JF; Manuel MB; Chilo SG; Lisy DOLAN. Surgical modifications of the upper airway for obstructive sleep apnea in adults: a systematic review and meta-analysis. SLEEP 2010;33(10):1253-7704. Phoebe HERNANDEZ. Hypopharyngeal surgery in obstructive sleep apnea: an evidence-based medicine review.  Arch Otolaryngol Head Neck Surg. 2006 Feb;132(2):206-13.  [facility], Jared Y, Saeed JAZMIN. The efficacy of anatomically based multilevel surgery for obstructive sleep apnea. Otolaryngol Head Neck Surg. 2003 Oct;129(4):327-35.  Phoebe HERNANDEZ, Goldberg A. Hypopharyngeal Surgery in Obstructive Sleep Apnea: An Evidence-Based Medicine Review. Arch Otolaryngol Head Neck Surg. 2006 Feb;132(2):206-13.  Garret PJ et al. Upper-Airway Stimulation for Obstructive Sleep Apnea.  N Engl J Med. 2014 Jan 9;370(2):139-49.  Olegario Y et al. Increased Incidence of Cardiovascular Disease in Middle-aged Men with Obstructive Sleep Apnea. Am J Respir Crit Care Med; 2002 166: 159-165  Parkinson EM et al. Studying Life Effects and Effectiveness of Palatopharyngoplasty (SLEEP) study: Subjective Outcomes of Isolated Uvulopalatopharyngoplasty. Otolaryngol Head Neck Surg. 2011; 144: 623-631.        WHAT IF I ONLY HAVE SNORING?    Mandibular advancement devices, lateral sleep positioning, long-term weight loss and treatment of nasal allergies have been shown to improve snoring.  Exercising tongue muscles with a game (https://[employer].CorCardia/us/musa/soundly-reduce-snoring/vv6619909643) or stimulating the tongue during the day with a device (https://doi.org/10.3390/dqp32488136) have improved snoring in some individuals.    Remember to Drive Safe... Drive Alive     Sleep health profoundly affects your health, mood, and your safety.  Thirty three percent of the population (one in three of us) is not getting enough sleep and many have a sleep disorder. Not getting enough sleep or having an untreated / undertreated sleep condition may make us sleepy without  even knowing it. In fact, our driving could be dramatically impaired due to our sleep health. As your provider, here are some things I would like you to know about driving:     Here are some warning signs for impairment and dangerous drowsy driving:              -Having been awake more than 16 hours               -Looking tired               -Eyelid drooping              -Head nodding (it could be too late at this point)              -Driving for more than 30 minutes     Some things you could do to make the driving safer if you are experiencing some drowsiness:              -Stop driving and rest              -Call for transportation              -Make sure your sleep disorder is adequately treated     Some things that have been shown NOT to work when experiencing drowsiness while driving:              -Turning on the radio              -Opening windows              -Eating any  distracting  /  entertaining  foods (e.g., sunflower seeds, candy, or any other)              -Talking on the phone      Your decision may not only impact your life, but also the life of others. Please, remember to drive safe for yourself and all of us.  Sleep environmental safety  Keeping padding by the side of the bed   Keeping lower mattress to the floor and/or use a ground-floor bedroom  Secure doors and windows (locks, alarms, barriers)  Remove sharp and dangerous objects from bedroom, including firearms  Closely monitor for any future episodes of dream enactment behaviors.

## 2023-08-22 ENCOUNTER — HOSPITAL ENCOUNTER (OUTPATIENT)
Dept: MRI IMAGING | Facility: CLINIC | Age: 48
Discharge: HOME | End: 2023-08-22
Attending: OTOLARYNGOLOGY | Admitting: OTOLARYNGOLOGY
Payer: OTHER GOVERNMENT

## 2023-08-22 DIAGNOSIS — H93.12 LEFT-SIDED TINNITUS: ICD-10-CM

## 2023-08-22 PROCEDURE — 70553 MRI BRAIN STEM W/O & W/DYE: CPT

## 2023-08-22 PROCEDURE — A9585 GADOBUTROL INJECTION: HCPCS | Performed by: OTOLARYNGOLOGY

## 2023-08-22 PROCEDURE — 255N000002 HC RX 255 OP 636: Performed by: OTOLARYNGOLOGY

## 2023-08-22 RX ORDER — GADOBUTROL 604.72 MG/ML
10 INJECTION INTRAVENOUS ONCE
Status: COMPLETED | OUTPATIENT
Start: 2023-08-22 | End: 2023-08-22

## 2023-08-22 RX ADMIN — GADOBUTROL 10 ML: 604.72 INJECTION INTRAVENOUS at 13:11

## 2023-08-28 ENCOUNTER — HOSPITAL ENCOUNTER (OUTPATIENT)
Dept: CT IMAGING | Facility: CLINIC | Age: 48
Discharge: HOME | End: 2023-08-28
Attending: INTERNAL MEDICINE | Admitting: INTERNAL MEDICINE
Payer: OTHER GOVERNMENT

## 2023-08-28 DIAGNOSIS — Z82.49 FAMILY HISTORY OF CORONARY ARTERY DISEASE OCCURRING PRIOR TO 55 YEARS OF AGE: ICD-10-CM

## 2023-08-28 DIAGNOSIS — R07.9 CHEST PAIN, UNSPECIFIED TYPE: ICD-10-CM

## 2023-08-28 PROCEDURE — 75571 CT HRT W/O DYE W/CA TEST: CPT

## 2023-08-28 PROCEDURE — 75571 CT HRT W/O DYE W/CA TEST: CPT | Mod: 26 | Performed by: INTERNAL MEDICINE

## 2023-08-30 DIAGNOSIS — Z84.81 FAMILY HISTORY OF GENETIC DISEASE CARRIER: Primary | ICD-10-CM

## 2023-08-30 NOTE — PROGRESS NOTES
Spoke with Sunny via telephone 08-16 to discuss participation in whole exome sequencing for his son Scott. We reviewed benefits, risks, limitations and ACMG secondary findings. Sunny's questions were answered. Placing order here.

## 2023-09-08 ASSESSMENT — SLEEP AND FATIGUE QUESTIONNAIRES
HOW LIKELY ARE YOU TO NOD OFF OR FALL ASLEEP WHILE LYING DOWN TO REST IN THE AFTERNOON WHEN CIRCUMSTANCES PERMIT: HIGH CHANCE OF DOZING
HOW LIKELY ARE YOU TO NOD OFF OR FALL ASLEEP WHILE SITTING AND READING: HIGH CHANCE OF DOZING
HOW LIKELY ARE YOU TO NOD OFF OR FALL ASLEEP IN A CAR, WHILE STOPPED FOR A FEW MINUTES IN TRAFFIC: MODERATE CHANCE OF DOZING
HOW LIKELY ARE YOU TO NOD OFF OR FALL ASLEEP WHILE SITTING QUIETLY AFTER LUNCH WITHOUT ALCOHOL: HIGH CHANCE OF DOZING
HOW LIKELY ARE YOU TO NOD OFF OR FALL ASLEEP WHEN YOU ARE A PASSENGER IN A CAR FOR AN HOUR WITHOUT A BREAK: HIGH CHANCE OF DOZING
HOW LIKELY ARE YOU TO NOD OFF OR FALL ASLEEP WHILE WATCHING TV: HIGH CHANCE OF DOZING
HOW LIKELY ARE YOU TO NOD OFF OR FALL ASLEEP WHILE SITTING AND TALKING TO SOMEONE: MODERATE CHANCE OF DOZING
HOW LIKELY ARE YOU TO NOD OFF OR FALL ASLEEP WHILE SITTING INACTIVE IN A PUBLIC PLACE: MODERATE CHANCE OF DOZING

## 2023-09-08 NOTE — PROGRESS NOTES
Cardiology Clinic Progress Note  Sunny Hayes MRN# [id number]   YOB: 1975 Age: 48 year old   Primary Cardiologist: Dr. Yip  Reason for visit: 1 month follow up             Assessment and Plan:       1. Hypertension, resistant,  with improved control  -Renal artery ultrasound negative for renal artery stenosis  -On chlorthalidone 12.5mg daily and olmesartan 40mg daily     2.  Family history of premature coronary artery disease       Coronary artery calcifications  -7/2023 stress echocardiogram negative for inducible ischemia  -8/2023 CT calcium score with total score of 2.14, recommendation for moderate intensity statin    3. Possible sleep apnea  -Sleep study completed yesterday, results pending    Patient overall is feeling well without recurrence of his left sided chest discomfort. He has continued to exercise with cardio and swimming. His hypertension is much better controlled however given that he recently stopped his wellbutrin and his stress level is lower, his blood pressure may continue to come down. I will have his stop his chlorthalidone, which should help with his dizziness and leg cramps.     His last lipid panel was done in early July with an LDL of 99 however was not fasting. I would like him to repeat this in the next 2 weeks when fasting. The recommendation based on his CT calcium score would be for moderate intensity statin therapy, dosing can be determined once we have the results of his lipid panel.     Changes today: STOP chlorthalidone, fasting lipid and ALT in the next two weeks    Follow up plan: Follow up with me in 4-6 weeks         History of Presenting Illness:    Sunny Hayes is a very pleasant 48 year old male with a history of hypertension, obstructive sleep apnea, gilbert's syndrome and strong family history of premature coronary disease.     Patient saw Dr. Chester Ward on 7/17/2023 following an a hospital admission the previous week for chest pain, at  which time his troponin was negative and EKG did not demonstrate any ischemic changes.  He underwent a stress echocardiogram which was negative for ischemia.  He was quite hypertensive and his losartan was switched to olmesartan 40 mg daily.  A CT calcium score was ordered for further risk stratification given patient's strong family history of coronary artery disease.    Patient underwent a CT calcium score 23 which showed a total score of 2.14, placing patient in the 64th percentile when compared to age and gender matched control groups.    Patient is here today for a 1 month follow up. Patient reports feeling good. He completed his sleep study last evening and found out he passed the PA boards this morning. When he exercises and gets his heart rate up in the 150's he gets dizzy and lightheaded. He is not having associated palpitations, syncope or pre-syncope. He also swims often, during which he gets leg cramps.     He tapered and discontinued Wellbutrin over the last month, his last dose was Saturday.     Patient denies chest pain or chest tightness. Denies presyncopal symptoms. Denies tachycardia or palpitations.     Blood pressure 124/68 and HR 87 in clinic today.        Recent Hospitalizations   As above          Social History      Social History     Socioeconomic History    Marital status:      Spouse name: Not on file    Number of children: Not on file    Years of education: Not on file    Highest education level: Not on file   Occupational History    Not on file   Tobacco Use    Smoking status: Former     Types: Cigarettes     Quit date: 2003     Years since quittin.7    Smokeless tobacco: Former     Quit date: 2010   Vaping Use    Vaping Use: Never used   Substance and Sexual Activity    Alcohol use: Yes     Comment: 1-2 drinks per week    Drug use: Never    Sexual activity: Yes     Partners: Female   Other Topics Concern    Not on file   Social History Narrative    Not on  file     Social Determinants of Health     Financial Resource Strain: Low Risk  (1/12/2023)    Overall Financial Resource Strain (CARDIA)     Difficulty of Paying Living Expenses: Not hard at all   Food Insecurity: No Food Insecurity (1/12/2023)    Hunger Vital Sign     Worried About Running Out of Food in the Last Year: Never true     Ran Out of Food in the Last Year: Never true   Transportation Needs: No Transportation Needs (1/12/2023)    PRAPARE - Transportation     Lack of Transportation (Medical): No     Lack of Transportation (Non-Medical): No   Physical Activity: Insufficiently Active (1/12/2023)    Exercise Vital Sign     Days of Exercise per Week: 4 days     Minutes of Exercise per Session: 30 min   Stress: Stress Concern Present (1/12/2023)    [employer] - Occupational Stress Questionnaire     Feeling of Stress : Rather much   Social Connections: Moderately Isolated (1/12/2023)    Social Connection and Isolation Panel [NHANES]     Frequency of Communication with Friends and Family: More than three times a week     Frequency of Social Gatherings with Friends and Family: Never     Attends Hoahaoism Services: Never     Active Member of Clubs or Organizations: No     Attends Club or Organization Meetings: Not on file     Marital Status:    Intimate Partner Violence: Not on file   Housing Stability: Low Risk  (1/12/2023)    Housing Stability Vital Sign     Unable to Pay for Housing in the Last Year: No     Number of Places Lived in the Last Year: 1     Unstable Housing in the Last Year: No            Review of Systems:   Skin:        Eyes:       ENT:       Respiratory:  Negative    Cardiovascular:    Positive for;dizziness  Gastroenterology:      Genitourinary:       Musculoskeletal:       Neurologic:       Psychiatric:       Heme/Lymph/Imm:       Endocrine:              Physical Exam:   Vitals: /68 (BP Location: Right arm, Patient Position: Sitting, Cuff Size: Adult  "Regular)   Pulse 87   Ht 1.778 m (5' 10\")   Wt 95.7 kg (211 lb)   SpO2 97%   BMI 30.28 kg/m     Wt Readings from Last 4 Encounters:   09/11/23 95.7 kg (211 lb)   08/14/23 95.3 kg (210 lb 1.6 oz)   07/17/23 94.4 kg (208 lb 3.2 oz)   07/08/23 91.4 kg (201 lb 8 oz)     GEN: well nourished, in no acute distress.  HEENT:  Pupils equal, round. Sclerae nonicteric.   NECK: Supple, no masses appreciated.   C/V:  Regular rate and rhythm, no murmur, rub or gallop.    RESP: Respirations are unlabored. Clear to auscultation bilaterally without wheezing, rales, or rhonchi.  GI: Abdomen soft, nontender.  EXTREM: No LE edema.  NEURO: Alert and oriented, cooperative.  SKIN: Warm and dry.        Data:     LIPID RESULTS:  Lab Results   Component Value Date    CHOL 185 07/08/2023    HDL 50 07/08/2023    LDL 99 07/08/2023    TRIG 180 (H) 07/08/2023     LIVER ENZYME RESULTS:  Lab Results   Component Value Date    AST 20 12/20/2021    ALT 33 12/20/2021     CBC RESULTS:  Lab Results   Component Value Date    WBC 8.6 07/08/2023    RBC 5.61 07/08/2023    HGB 11.4 (L) 07/08/2023    HCT 36.5 (L) 07/08/2023    MCV 65 (L) 07/08/2023    MCH 20.3 (L) 07/08/2023    MCHC 31.2 (L) 07/08/2023    RDW 16.5 (H) 07/08/2023     07/08/2023     BMP RESULTS:  Lab Results   Component Value Date     07/08/2023    POTASSIUM 3.5 07/08/2023    POTASSIUM 4.2 12/20/2021    CHLORIDE 101 07/08/2023    CHLORIDE 107 12/20/2021    CO2 27 07/08/2023    CO2 26 12/20/2021    ANIONGAP 10 07/08/2023    ANIONGAP 5 12/20/2021     (H) 07/08/2023    GLC 91 12/20/2021    BUN 10.9 07/08/2023    BUN 10 12/20/2021    CR 0.85 07/08/2023    GFRESTIMATED >90 07/08/2023    FERNANDO 9.6 07/08/2023      A1C RESULTS:  Lab Results   Component Value Date    A1C 5.0 07/08/2023     INR RESULTS:  No results found for: INR         Medications     Current Outpatient Medications   Medication Sig Dispense Refill    albuterol (PROAIR HFA/PROVENTIL HFA/VENTOLIN HFA) 108 (90 Base) " MCG/ACT inhaler Inhale 2 puffs into the lungs every 6 hours as needed for shortness of breath, wheezing or cough 18 g 3    esomeprazole (NEXIUM) 40 MG DR capsule Take 1 capsule (40 mg) by mouth every morning (before breakfast) 90 capsule 3    fluticasone-salmeterol (ADVAIR) 500-50 MCG/ACT inhaler Inhale 1 puff into the lungs every 12 hours 60 each 3    levothyroxine (SYNTHROID/LEVOTHROID) 175 MCG tablet Take 1 tablet (175 mcg) by mouth daily 90 tablet 3    olmesartan (BENICAR) 40 MG tablet Take 1 tablet (40 mg) by mouth daily 90 tablet 3          Past Medical History     Past Medical History:   Diagnosis Date    Gastroesophageal reflux disease     Hypertension     Hypothyroidism     Uncomplicated asthma      No past surgical history on file.  No family history on file.         Allergies   Ace inhibitors; Bicillin c-r,; and Penicillins        Janelle Heaton NP  [facility]  Pager: [phone]

## 2023-09-10 ENCOUNTER — THERAPY VISIT (OUTPATIENT)
Dept: SLEEP MEDICINE | Facility: CLINIC | Age: 48
End: 2023-09-10
Attending: INTERNAL MEDICINE
Payer: OTHER GOVERNMENT

## 2023-09-10 ENCOUNTER — DOCUMENTATION ONLY (OUTPATIENT)
Dept: SLEEP MEDICINE | Facility: CLINIC | Age: 48
End: 2023-09-10

## 2023-09-10 DIAGNOSIS — G47.33 OSA (OBSTRUCTIVE SLEEP APNEA): Primary | ICD-10-CM

## 2023-09-10 DIAGNOSIS — G47.9 SLEEP DISTURBANCE: ICD-10-CM

## 2023-09-10 PROCEDURE — 95810 POLYSOM 6/> YRS 4/> PARAM: CPT | Performed by: INTERNAL MEDICINE

## 2023-09-11 ENCOUNTER — OFFICE VISIT (OUTPATIENT)
Dept: CARDIOLOGY | Facility: CLINIC | Age: 48
End: 2023-09-11
Attending: INTERNAL MEDICINE
Payer: OTHER GOVERNMENT

## 2023-09-11 VITALS
HEIGHT: 70 IN | DIASTOLIC BLOOD PRESSURE: 68 MMHG | BODY MASS INDEX: 30.21 KG/M2 | WEIGHT: 211 LBS | OXYGEN SATURATION: 97 % | HEART RATE: 87 BPM | SYSTOLIC BLOOD PRESSURE: 124 MMHG

## 2023-09-11 DIAGNOSIS — R07.9 CHEST PAIN, UNSPECIFIED TYPE: ICD-10-CM

## 2023-09-11 DIAGNOSIS — Z82.49 FAMILY HISTORY OF CORONARY ARTERY DISEASE OCCURRING PRIOR TO 55 YEARS OF AGE: ICD-10-CM

## 2023-09-11 DIAGNOSIS — G47.33 OSA (OBSTRUCTIVE SLEEP APNEA): ICD-10-CM

## 2023-09-11 DIAGNOSIS — I25.10 CORONARY ARTERY CALCIFICATION: Primary | ICD-10-CM

## 2023-09-11 DIAGNOSIS — I10 ESSENTIAL HYPERTENSION: ICD-10-CM

## 2023-09-11 PROCEDURE — 99214 OFFICE O/P EST MOD 30 MIN: CPT | Performed by: NURSE PRACTITIONER

## 2023-09-11 NOTE — PATIENT INSTRUCTIONS
Today's Recommendations    Your calcium score was low however places you in the 64th percentile for risk, I would like to see what your cholesterol testing looks like before we discuss a statin medication  Lets stop your chlorthalidone  Have your cholesterol rechecked in the next few weeks  Please follow up with me in 1 month.    Please send a [employer] message or call [phone] to the RN team with questions or concerns.     Scheduling number [phone]  KASANDRA Knight, CNP

## 2023-09-11 NOTE — LETTER
9/11/2023    Bubba Powers MD  [address]  [facility] [address]    RE: Sunny Hayes       Dear Colleague,     [facility] the [facility].  Cardiology Clinic Progress Note  Sunny Hayes MRN# [id number]   YOB: 1975 Age: 48 year old   Primary Cardiologist: Dr. Yip  Reason for visit: 1 month follow up             Assessment and Plan:       1. Hypertension, resistant,  with improved control  -Renal artery ultrasound negative for renal artery stenosis  -On chlorthalidone 12.5mg daily and olmesartan 40mg daily     2.  Family history of premature coronary artery disease       Coronary artery calcifications  -7/2023 stress echocardiogram negative for inducible ischemia  -8/2023 CT calcium score with total score of 2.14, recommendation for moderate intensity statin    3. Possible sleep apnea  -Sleep study completed yesterday, results pending    Patient overall is feeling well without recurrence of his left sided chest discomfort. He has continued to exercise with cardio and swimming. His hypertension is much better controlled however given that he recently stopped his wellbutrin and his stress level is lower, his blood pressure may continue to come down. I will have his stop his chlorthalidone, which should help with his dizziness and leg cramps.     His last lipid panel was done in early July with an LDL of 99 however was not fasting. I would like him to repeat this in the next 2 weeks when fasting. The recommendation based on his CT calcium score would be for moderate intensity statin therapy, dosing can be determined once we have the results of his lipid panel.     Changes today: STOP chlorthalidone, fasting lipid and ALT in the next two weeks    Follow up plan: Follow up with me in 4-6 weeks         History of Presenting Illness:    Sunny Hayes is a very pleasant 48 year old male with a history of hypertension, obstructive sleep  apnea, gilbert's syndrome and strong family history of premature coronary disease.     Patient saw Dr. Chester Ward on 2023 following an a hospital admission the previous week for chest pain, at which time his troponin was negative and EKG did not demonstrate any ischemic changes.  He underwent a stress echocardiogram which was negative for ischemia.  He was quite hypertensive and his losartan was switched to olmesartan 40 mg daily.  A CT calcium score was ordered for further risk stratification given patient's strong family history of coronary artery disease.    Patient underwent a CT calcium score 23 which showed a total score of 2.14, placing patient in the 64th percentile when compared to age and gender matched control groups.    Patient is here today for a 1 month follow up. Patient reports feeling good. He completed his sleep study last evening and found out he passed the PA boards this morning. When he exercises and gets his heart rate up in the 150's he gets dizzy and lightheaded. He is not having associated palpitations, syncope or pre-syncope. He also swims often, during which he gets leg cramps.     He tapered and discontinued Wellbutrin over the last month, his last dose was Saturday.     Patient denies chest pain or chest tightness. Denies presyncopal symptoms. Denies tachycardia or palpitations.     Blood pressure 124/68 and HR 87 in clinic today.        Recent Hospitalizations   As above          Social History      Social History     Socioeconomic History    Marital status:      Spouse name: Not on file    Number of children: Not on file    Years of education: Not on file    Highest education level: Not on file   Occupational History    Not on file   Tobacco Use    Smoking status: Former     Types: Cigarettes     Quit date: 2003     Years since quittin.7    Smokeless tobacco: Former     Quit date: 2010   Vaping Use    Vaping Use: Never used   Substance and Sexual  Activity    Alcohol use: Yes     Comment: 1-2 drinks per week    Drug use: Never    Sexual activity: Yes     Partners: Female   Other Topics Concern    Not on file   Social History Narrative    Not on file     Social Determinants of Health     Financial Resource Strain: Low Risk  (1/12/2023)    Overall Financial Resource Strain (CARDIA)     Difficulty of Paying Living Expenses: Not hard at all   Food Insecurity: No Food Insecurity (1/12/2023)    Hunger Vital Sign     Worried About Running Out of Food in the Last Year: Never true     Ran Out of Food in the Last Year: Never true   Transportation Needs: No Transportation Needs (1/12/2023)    PRAPARE - Transportation     Lack of Transportation (Medical): No     Lack of Transportation (Non-Medical): No   Physical Activity: Insufficiently Active (1/12/2023)    Exercise Vital Sign     Days of Exercise per Week: 4 days     Minutes of Exercise per Session: 30 min   Stress: Stress Concern Present (1/12/2023)    [employer] - Occupational Stress Questionnaire     Feeling of Stress : Rather much   Social Connections: Moderately Isolated (1/12/2023)    Social Connection and Isolation Panel [NHANES]     Frequency of Communication with Friends and Family: More than three times a week     Frequency of Social Gatherings with Friends and Family: Never     Attends Rastafari Services: Never     Active Member of Clubs or Organizations: No     Attends Club or Organization Meetings: Not on file     Marital Status:    Intimate Partner Violence: Not on file   Housing Stability: Low Risk  (1/12/2023)    Housing Stability Vital Sign     Unable to Pay for Housing in the Last Year: No     Number of Places Lived in the Last Year: 1     Unstable Housing in the Last Year: No            Review of Systems:   Skin:        Eyes:       ENT:       Respiratory:  Negative    Cardiovascular:    Positive for;dizziness  Gastroenterology:      Genitourinary:      "  Musculoskeletal:       Neurologic:       Psychiatric:       Heme/Lymph/Imm:       Endocrine:              Physical Exam:   Vitals: /68 (BP Location: Right arm, Patient Position: Sitting, Cuff Size: Adult Regular)   Pulse 87   Ht 1.778 m (5' 10\")   Wt 95.7 kg (211 lb)   SpO2 97%   BMI 30.28 kg/m     Wt Readings from Last 4 Encounters:   09/11/23 95.7 kg (211 lb)   08/14/23 95.3 kg (210 lb 1.6 oz)   07/17/23 94.4 kg (208 lb 3.2 oz)   07/08/23 91.4 kg (201 lb 8 oz)     GEN: well nourished, in no acute distress.  HEENT:  Pupils equal, round. Sclerae nonicteric.   NECK: Supple, no masses appreciated.   C/V:  Regular rate and rhythm, no murmur, rub or gallop.    RESP: Respirations are unlabored. Clear to auscultation bilaterally without wheezing, rales, or rhonchi.  GI: Abdomen soft, nontender.  EXTREM: No LE edema.  NEURO: Alert and oriented, cooperative.  SKIN: Warm and dry.        Data:     LIPID RESULTS:  Lab Results   Component Value Date    CHOL 185 07/08/2023    HDL 50 07/08/2023    LDL 99 07/08/2023    TRIG 180 (H) 07/08/2023     LIVER ENZYME RESULTS:  Lab Results   Component Value Date    AST 20 12/20/2021    ALT 33 12/20/2021     CBC RESULTS:  Lab Results   Component Value Date    WBC 8.6 07/08/2023    RBC 5.61 07/08/2023    HGB 11.4 (L) 07/08/2023    HCT 36.5 (L) 07/08/2023    MCV 65 (L) 07/08/2023    MCH 20.3 (L) 07/08/2023    MCHC 31.2 (L) 07/08/2023    RDW 16.5 (H) 07/08/2023     07/08/2023     BMP RESULTS:  Lab Results   Component Value Date     07/08/2023    POTASSIUM 3.5 07/08/2023    POTASSIUM 4.2 12/20/2021    CHLORIDE 101 07/08/2023    CHLORIDE 107 12/20/2021    CO2 27 07/08/2023    CO2 26 12/20/2021    ANIONGAP 10 07/08/2023    ANIONGAP 5 12/20/2021     (H) 07/08/2023    GLC 91 12/20/2021    BUN 10.9 07/08/2023    BUN 10 12/20/2021    CR 0.85 07/08/2023    GFRESTIMATED >90 07/08/2023    FERNANDO 9.6 07/08/2023      A1C RESULTS:  Lab Results   Component Value Date    A1C 5.0 " 07/08/2023     INR RESULTS:  No results found for: INR         Medications     Current Outpatient Medications   Medication Sig Dispense Refill    albuterol (PROAIR HFA/PROVENTIL HFA/VENTOLIN HFA) 108 (90 Base) MCG/ACT inhaler Inhale 2 puffs into the lungs every 6 hours as needed for shortness of breath, wheezing or cough 18 g 3    esomeprazole (NEXIUM) 40 MG DR capsule Take 1 capsule (40 mg) by mouth every morning (before breakfast) 90 capsule 3    fluticasone-salmeterol (ADVAIR) 500-50 MCG/ACT inhaler Inhale 1 puff into the lungs every 12 hours 60 each 3    levothyroxine (SYNTHROID/LEVOTHROID) 175 MCG tablet Take 1 tablet (175 mcg) by mouth daily 90 tablet 3    olmesartan (BENICAR) 40 MG tablet Take 1 tablet (40 mg) by mouth daily 90 tablet 3          Past Medical History     Past Medical History:   Diagnosis Date    Gastroesophageal reflux disease     Hypertension     Hypothyroidism     Uncomplicated asthma      No past surgical history on file.  No family history on file.         Allergies   Ace inhibitors; Bicillin c-r,; and Penicillins        Janelle Heaton NP  [facility]  Pager: [phone]       Thank you for allowing me to participate in the care of your patient.      Sincerely,     Janelle Heaton NP     [facility]  cc:   Judson Yip MD  [address] [address]

## 2023-09-13 ENCOUNTER — MYC MEDICAL ADVICE (OUTPATIENT)
Dept: FAMILY MEDICINE | Facility: CLINIC | Age: 48
End: 2023-09-13
Payer: OTHER GOVERNMENT

## 2023-09-13 DIAGNOSIS — J45.40 MODERATE PERSISTENT ASTHMA, UNSPECIFIED WHETHER COMPLICATED: ICD-10-CM

## 2023-09-13 RX ORDER — FLUTICASONE PROPIONATE AND SALMETEROL 500; 50 UG/1; UG/1
1 POWDER RESPIRATORY (INHALATION) EVERY 12 HOURS
Qty: 60 EACH | Refills: 3 | Status: SHIPPED | OUTPATIENT
Start: 2023-09-13 | End: 2024-02-02

## 2023-09-13 NOTE — TELEPHONE ENCOUNTER
Patient sends my chart note asking for refill of Advair. Will pend order and send it to refill pool.

## 2023-09-14 ENCOUNTER — LAB (OUTPATIENT)
Dept: LAB | Facility: CLINIC | Age: 48
End: 2023-09-14
Payer: OTHER GOVERNMENT

## 2023-09-14 DIAGNOSIS — I25.10 CORONARY ARTERY CALCIFICATION: ICD-10-CM

## 2023-09-14 LAB
ALT SERPL W P-5'-P-CCNC: 19 U/L (ref 0–70)
CHOLEST SERPL-MCNC: 182 MG/DL
HDLC SERPL-MCNC: 51 MG/DL
LDLC SERPL CALC-MCNC: 109 MG/DL
NONHDLC SERPL-MCNC: 131 MG/DL
TRIGL SERPL-MCNC: 111 MG/DL

## 2023-09-14 PROCEDURE — 80061 LIPID PANEL: CPT

## 2023-09-14 PROCEDURE — 84460 ALANINE AMINO (ALT) (SGPT): CPT

## 2023-09-14 PROCEDURE — 36415 COLL VENOUS BLD VENIPUNCTURE: CPT

## 2023-09-15 ENCOUNTER — TELEPHONE (OUTPATIENT)
Dept: CARDIOLOGY | Facility: CLINIC | Age: 48
End: 2023-09-15
Payer: OTHER GOVERNMENT

## 2023-09-15 DIAGNOSIS — E78.5 HYPERLIPIDEMIA WITH TARGET LDL LESS THAN 70: ICD-10-CM

## 2023-09-15 DIAGNOSIS — I25.10 CORONARY ARTERY CALCIFICATION: Primary | ICD-10-CM

## 2023-09-15 DIAGNOSIS — I25.10 ASCVD (ARTERIOSCLEROTIC CARDIOVASCULAR DISEASE): ICD-10-CM

## 2023-09-15 RX ORDER — ROSUVASTATIN CALCIUM 5 MG/1
5 TABLET, COATED ORAL DAILY
Qty: 90 TABLET | Refills: 0 | Status: SHIPPED | OUTPATIENT
Start: 2023-09-15 | End: 2023-09-18

## 2023-09-15 NOTE — TELEPHONE ENCOUNTER
----- Message from Yolanda Matthews RN sent at 9/15/2023  9:00 AM CDT -----    ----- Message -----  From: Janelle Heaton NP  Sent: 9/14/2023   1:16 PM CDT  To: Yolanda Matthews RN    Lipid panel showed elevated LDL> goal. Given recent CT calcium score would recommend initiation of moderate intensity statin therapy with 5mg rosuvastatin for risk factor modification.

## 2023-09-15 NOTE — TELEPHONE ENCOUNTER
Called patient was recommendations from Ceci Heaton NP to start rosuvastatin 5 mg daily with FLP/ALT in 6 to 8 weeks.  Prescription E scripted to [facility] as requested and order placed for FLP/ALT in 8 weeks as patient states he will take 2 weeks to get prescription from [facility]. Marcy SAMUEL

## 2023-09-17 NOTE — PROCEDURES
"         SLEEP STUDY INTERPRETATION  DIAGNOSTIC POLYSOMNOGRAPHY REPORT      Patient: BOBBY RODRIGUES  YOB: 1975  Study Date: 9/10/2023  MRN: [id number]  Referring Provider: -  Ordering Provider: Dr. Barnes    Indications for Polysomnography: The patient is a 48-year-old Male who is 5' 10\" and weighs 210.0 lbs. His BMI is 29.7, [place] sleepiness scale 18 and neck circumference is 36 cm. Patient reports snoring, observed apneas during sleep, non-restorative sleep, fatigue and excessive daytime sleepiness, in the setting of HTN, hypothyroidism, gastroesophageal reflux disease, Gilbert's syndrome, John's esophagus without dysplasia. A diagnostic polysomnogram was performed to evaluate for sleep apnea/hypoxemia.    Polysomnogram Data: A full night polysomnogram recorded the standard physiologic parameters including EEG, EOG, EMG, ECG, nasal and oral airflow. Respiratory parameters of chest and abdominal movements were recorded with respiratory inductance plethysmography. Oxygen saturation was recorded by pulse oximetry. Hypopnea scoring rule used: 1B 4%.    Sleep Architecture: Sleep architecture was remarkable for sleep fragmentation and increased wake after sleep onset with reduced sleep efficiency.  All sleep stages were seen.  The total recording time of the polysomnogram was 464.9 minutes. The total sleep time was 365.5 minutes. Sleep latency was decreased at 9.1 minutes with the use of a sleep aid (3 mg melatonin at 2050 and another 3 mg at 0105). REM latency was 98.0 minutes. Arousal index was increased at 48.6 arousals per hour (due to spontaneous arousals and respiratory events). Sleep efficiency was decreased at 78.6%. Wake after sleep onset was 90.0 minutes. The patient spent 21.6% of total sleep time in Stage N1, 43.2% in Stage N2, 17.9% in Stage N3, and 17.2% in REM. Time in REM supine was 32.5 minutes.    Respiration: Moderate obstructive sleep apnea was present (both obstructive and central " apneas were seen, and obstructive>central apneas) with sleep associated hypoxemia. The disordered breathing events were pronounced during supine sleep and during REM sleep, particularly pronounced during REM sleep in supine position.    Events ? The polysomnogram revealed a presence of 40 obstructive, 32 central, and 18 mixed apneas resulting in an apnea index of 14.8 events per hour. There were 42 obstructive hypopneas and 16 central hypopneas resulting in an obstructive hypopnea index of 6.9 and central hypopnea index of 2.6 events per hour. The combined apnea/hypopnea index was 24.3 events per hour (central apnea/hypopnea index was 7.9 events per hour). The REM AHI was 41.9 events per hour. The supine AHI was 35.4 events per hour. The RERA index was 2.1 events per hour.  The RDI was 26.4 events per hour.  Snoring - was reported as mild to moderate.  Respiratory rate and pattern - was notable for normal respiratory rate and intermittent periodicity in breathing pattern.  Sustained Sleep Associated Hypoventilation - Transcutaneous carbon dioxide monitoring was not used.  Sleep Associated Hypoxemia - (Greater than 5 minutes O2 sat at or below 88%) was present. Baseline oxygen saturation was 93.8%. Lowest oxygen saturation was 82.0%. Time spent less than or equal to 88% was 5.7 minutes. Time spent less than or equal to 89% was 11.1 minutes.    Movement Activity: There were no significant limb movements during the study.  Abnormal sleep related behaviors were not noted.    Periodic Limb Activity - There were 21 PLMs during the entire study. The PLM index was 3.4 movements per hour. The PLM Arousal Index was 0.5 per hour.  REM EMG Activity - Excessive transient/sustained muscle activity was not present.  Nocturnal Behavior - Abnormal sleep related behaviors were not noted during/arising out of NREM / REM sleep.    Bruxism - Not apparent.    Cardiac Summary: Normal sinus rhythm was noted with occasional sinus  tachycardia.  The average pulse rate was 63.1 bpm. The minimum pulse rate was 45.0 bpm while the maximum pulse rate was 99.0 bpm.  Arrhythmias were not noted.    Assessment:   Sleep architecture was remarkable for sleep fragmentation and increased wake after sleep onset with reduced sleep efficiency.  All sleep stages were seen.  Moderate obstructive sleep apnea was present (both obstructive and central apneas were seen, and obstructive>central apneas) with sleep associated hypoxemia. The disordered breathing events were pronounced during supine sleep and during REM sleep, particularly pronounced during REM sleep in supine position.    There were no significant limb movements during the study.    Abnormal sleep related behaviors were not noted during the study.  Normal sinus rhythm was noted with occasional sinus tachycardia.    Recommendations:  Recommend repeat polysomnography with full night titration of positive airway pressure therapy for the control of sleep disordered breathing.  Based on the presence of moderate obstructive sleep apnea and excessive daytime sleepiness, treatment could be empirically initiated with auto titrating PAP therapy with a range of 5 to 15 cmH2O. Recommend clinical follow up with sleep management team.  Alternative option includes combination of positional therapy during sleep along with dental appliance through referral to Sleep Dentistry for the treatment of obstructive sleep apnea and/or socially disruptive snoring.  If devices are not acceptable or effective, patient may benefit from evaluation of possible surgical options. If he is interested, would recommend referral to specialized ENT-Sleep provider.  Advice regarding the risks of drowsy driving.  Suggest optimizing sleep schedule and avoiding sleep deprivation.  Weight management (if BMI > 30).  Pharmacologic therapy should be used for management of restless legs syndrome only if present and clinically indicated and not based  on the presence of periodic limb movements alone.    Diagnostic Codes:   Obstructive Sleep Apnea G47.33  Sleep Hypoxemia/Hypoventilation G47.36   Repetitive Intrusions Into Sleep F51.8       9/10/2023 [facility] (210.0 lbs) - AHI 24.3, RDI 26.4, Supine AHI 35.4, REM AHI 41.9, Low O2 82.0%, Time Spent ?88% 5.7 minutes / Time Spent ?89% 11.1 minutes.       _____________________________________   Electronically Signed By: (Mel Storm MD), 9/17/23

## 2023-09-18 DIAGNOSIS — I25.10 CORONARY ARTERY CALCIFICATION: ICD-10-CM

## 2023-09-18 DIAGNOSIS — E78.5 HYPERLIPIDEMIA WITH TARGET LDL LESS THAN 70: ICD-10-CM

## 2023-09-18 DIAGNOSIS — I25.10 ASCVD (ARTERIOSCLEROTIC CARDIOVASCULAR DISEASE): ICD-10-CM

## 2023-09-18 LAB — SLPCOMP: NORMAL

## 2023-09-18 RX ORDER — ROSUVASTATIN CALCIUM 5 MG/1
5 TABLET, COATED ORAL DAILY
Qty: 90 TABLET | Refills: 0 | Status: SHIPPED | OUTPATIENT
Start: 2023-09-18 | End: 2023-11-17

## 2023-09-25 ASSESSMENT — SLEEP AND FATIGUE QUESTIONNAIRES
HOW LIKELY ARE YOU TO NOD OFF OR FALL ASLEEP WHILE SITTING QUIETLY AFTER LUNCH WITHOUT ALCOHOL: MODERATE CHANCE OF DOZING
HOW LIKELY ARE YOU TO NOD OFF OR FALL ASLEEP WHILE WATCHING TV: MODERATE CHANCE OF DOZING
HOW LIKELY ARE YOU TO NOD OFF OR FALL ASLEEP WHEN YOU ARE A PASSENGER IN A CAR FOR AN HOUR WITHOUT A BREAK: HIGH CHANCE OF DOZING
HOW LIKELY ARE YOU TO NOD OFF OR FALL ASLEEP WHILE SITTING INACTIVE IN A PUBLIC PLACE: MODERATE CHANCE OF DOZING
HOW LIKELY ARE YOU TO NOD OFF OR FALL ASLEEP WHILE SITTING AND READING: MODERATE CHANCE OF DOZING
HOW LIKELY ARE YOU TO NOD OFF OR FALL ASLEEP WHILE SITTING AND TALKING TO SOMEONE: MODERATE CHANCE OF DOZING
HOW LIKELY ARE YOU TO NOD OFF OR FALL ASLEEP WHILE LYING DOWN TO REST IN THE AFTERNOON WHEN CIRCUMSTANCES PERMIT: HIGH CHANCE OF DOZING
HOW LIKELY ARE YOU TO NOD OFF OR FALL ASLEEP IN A CAR, WHILE STOPPED FOR A FEW MINUTES IN TRAFFIC: MODERATE CHANCE OF DOZING

## 2023-09-26 ENCOUNTER — OFFICE VISIT (OUTPATIENT)
Dept: SLEEP MEDICINE | Facility: CLINIC | Age: 48
End: 2023-09-26
Payer: OTHER GOVERNMENT

## 2023-09-26 VITALS
DIASTOLIC BLOOD PRESSURE: 85 MMHG | OXYGEN SATURATION: 98 % | HEART RATE: 83 BPM | HEIGHT: 70 IN | WEIGHT: 213 LBS | BODY MASS INDEX: 30.49 KG/M2 | SYSTOLIC BLOOD PRESSURE: 147 MMHG

## 2023-09-26 DIAGNOSIS — G47.33 OSA (OBSTRUCTIVE SLEEP APNEA): Primary | ICD-10-CM

## 2023-09-26 PROCEDURE — 99213 OFFICE O/P EST LOW 20 MIN: CPT | Performed by: INTERNAL MEDICINE

## 2023-09-26 NOTE — PATIENT INSTRUCTIONS
Your BMI is Body mass index is 30.56 kg/m .    What is BMI?  Body mass index (BMI) is one way to tell whether you are at a healthy weight, overweight, or obese. It measures your weight in relation to your height.  A BMI of 18.5 to 24.9 is in the healthy range. A person with a BMI of 25 to 29.9 is considered overweight, and someone with a BMI of 30 or greater is considered obese.  Another way to find out if you are at risk for health problems caused by overweight and obesity is to measure your waist. If you are a woman and your waist is more than 35 inches, or if you are a man and your waist is more than 40 inches, your risk of disease may be higher.  More than two-thirds of American adults are considered overweight or obese. Being overweight or obese increases the risk for further weight gain.  Excess weight may lead to heart disease and diabetes. Creating and following plans for healthy eating and physical activity may help you improve your health.    Methods for maintaining or losing weight.  Weight control is part of healthy lifestyle and includes exercise, emotional health, and healthy eating habits.  Careful eating habits lifelong is the mainstay of weight control.  Though there are significant health benefits from weight loss, long-term weight loss with diet alone may be very difficult to achieve- studies show long-term success with dietary management in less than 10% of people. Attaining a healthy weight may be especially difficult to achieve in those with severe obesity. In some cases, medications, devices and surgical management might be considered.    What can you do?  If you are overweight or obese and are interested in methods for weight loss, you should discuss this with your provider. In addition, we recommend that you review healthy life styles and methods for weight loss available through the National Institutes of Health patient information sites:    http://win.niddk.nih.gov/publications/index.htm      Your blood pressure was checked while you were in clinic today.  Please read the guidelines below about what these numbers mean and what you should do about them.  Your systolic blood pressure is the top number.  This is the pressure when the heart is pumping.  Your diastolic blood pressure is the bottom number.  This is the pressure in between beats.  If your systolic blood pressure is less than 120 and your diastolic blood pressure is less than 80, then your blood pressure is normal. There is nothing more that you need to do about it  If your systolic blood pressure is 120-139 or your diastolic blood pressure is 80-89, your blood pressure may be higher than it should be.  You should have your blood pressure re-checked within a year by a primary care provider.  If your systolic blood pressure is 140 or greater or your diastolic blood pressure is 90 or greater, you may have high blood pressure.  High blood pressure is treatable, but if left untreated over time it can put you at risk for heart attack, stroke, or kidney failure.  You should have your blood pressure re-checked by a primary care provider within the next four weeks.

## 2023-09-27 ENCOUNTER — DOCUMENTATION ONLY (OUTPATIENT)
Dept: SLEEP MEDICINE | Facility: CLINIC | Age: 48
End: 2023-09-27

## 2023-09-27 ENCOUNTER — DOCUMENTATION ONLY (OUTPATIENT)
Dept: SLEEP MEDICINE | Facility: CLINIC | Age: 48
End: 2023-09-27
Payer: OTHER GOVERNMENT

## 2023-09-27 DIAGNOSIS — G47.33 OBSTRUCTIVE SLEEP APNEA (ADULT) (PEDIATRIC): Primary | ICD-10-CM

## 2023-09-27 NOTE — PROGRESS NOTES
Patient was offered choice of vendor and chose [facility].  Patient Sunny Hayes was set up at [place] on September 27, 2023. Patient received a Resmed Airsense 10 Pressures were set at  5-15 cm H2O.   Patient s ramp is 5 cm H2O for Auto and FLEX/EPR is 2.  Patient received a Resmed Mask name: AIRTOUCH F20  Full Face mask size Large, heated tubing and heated humidifier.  Patient has the following compliance requirements: none.  Alicia Wilburn

## 2023-10-02 ENCOUNTER — DOCUMENTATION ONLY (OUTPATIENT)
Dept: SLEEP MEDICINE | Facility: CLINIC | Age: 48
End: 2023-10-02
Payer: OTHER GOVERNMENT

## 2023-10-02 NOTE — PROGRESS NOTES
3 day Sleep therapy management telephone visit    Diagnostic AHI: 24.3 PSG    Confirmed with patient at time of call- Yes Patient is still interested in STM service       Subjective measures:  Spoke with patient he stated he has a crooked nose so it's been hard for to keep his CPAP mask on.  He usually wears it it for about four hours.  Patient already has done a mask exchange.  We talked about getting a pillow mask.          Objective data     Order Settings for PAP  CPAP min     CPAP max              Device settings from machine CPAP min 5.0     CPAP max 15.0           EPR Setting TWO    RESMED soft response  OFF     Assessment: Nighty usage most nights over four hours      Action plan: Patient to have 14 day STM visit. Patient has a follow up visit scheduled:   yes within 31-90 days of set up    Replacement device: No  STM ordered by provider: Yes     Total time spent on accessing and  interpreting remote patient PAP therapy data  10 minutes    Total time spent counseling, coaching  and reviewing PAP therapy data with patient  6 minutes    [id number] no

## 2023-10-03 ENCOUNTER — DOCUMENTATION ONLY (OUTPATIENT)
Dept: SLEEP MEDICINE | Facility: CLINIC | Age: 48
End: 2023-10-03
Payer: OTHER GOVERNMENT

## 2023-10-03 NOTE — PROGRESS NOTES
10/3/23- JL- APPT MADE ON 10/11/23.   MASK EXCHANGE APPROVED BY NOAH WETZEL.  HE WANTS TO TRY F30I DUE TO HAVING HAD A BROKEN NOSE, OTHER MASKS HURT THE BRIDGE OF HIS NOSE.  HE KNOWS [facility] WILL NOT HONOR ANY MORE MASK EXCHANGES.

## 2023-10-11 ENCOUNTER — DOCUMENTATION ONLY (OUTPATIENT)
Dept: SLEEP MEDICINE | Facility: CLINIC | Age: 48
End: 2023-10-11
Payer: OTHER GOVERNMENT

## 2023-10-11 NOTE — PROGRESS NOTES
Patient came to [facility] for mask fitting appointment on October 11, 2023. Patient requested to switch masks because soreness/skin irritation . Discussed the following masks: Airfit F30i Patient selected a Resmed Mask name: F30i Full Face mask size Large frame, med cushion.  Second 30 day mask exchange approved by mgr Jana HOGAN

## 2023-10-18 ENCOUNTER — DOCUMENTATION ONLY (OUTPATIENT)
Dept: SLEEP MEDICINE | Facility: CLINIC | Age: 48
End: 2023-10-18
Payer: OTHER GOVERNMENT

## 2023-10-18 NOTE — PROGRESS NOTES
14  DAY STM VISIT    Diagnostic AHI: 24.3  PSG    Subjective measures: Pt reports doing better on CPAP since he switched to the F30i. He still sometimes struggles with mask leak. Will send pt mask fitting instructions. Pt was given my contact information and instructed to reach out with any questions or concerns.       Assessment: Pt not meeting objective benchmarks for compliance  Patient failing following subjective benchmarks: leak issues     Action plan: pt to have 30 day STM visit.      Device type: Auto-CPAP    PAP settings:  DEVICE TYPE CPAP MIN CPAP MAX 95TH % PRESSURE EPR MASK DISPENSED   Auto-CPAP    5.0 cm  H20 15.0 cm  H20 9.9 cm  H20  TWO Full Face Mask     Mask type:  Full Face Mask    Objective measures: 14 day rolling measures   COMPLIANCE LEAK AHI AVERAGE USE IN MINUTES   14 % 5.12 5.41 156   GOAL >70% GOAL < 24 LPM GOAL <5 GOAL >240      Patient has the following upcoming sleep appts:  Future Sleep Appointments         Provider Department    12/4/2023 3:30 PM (Arrive by 3:15 PM) Maru Adams, KASANDRA [facility]            Total time spent on accessing and interpreting remote patient PAP therapy data  10 minutes    Total time spent counseling, coaching  and reviewing PAP therapy data with patient  3 minutes    [id number]  [id number]  no (3 day STM)

## 2023-11-02 ENCOUNTER — DOCUMENTATION ONLY (OUTPATIENT)
Dept: SLEEP MEDICINE | Facility: CLINIC | Age: 48
End: 2023-11-02
Payer: OTHER GOVERNMENT

## 2023-11-02 NOTE — PROGRESS NOTES
30 DAY STM VISIT    Diagnostic AHI: 24.3  PSG    PAP settings:  CPAP MIN CPAP MAX 95TH % PRESSURE EPR RESMED SOFT RESPONSE SETTING   5.0 cm  H20 15.0 cm  H20 9.5 cm  H20  TWO OFF     Device type: Auto-CPAP  Mask type:  Full Face Mask    Objective measures: 14 day rolling measures:    COMPLIANCE LEAK AHI AVERAGE USE IN MINUTES   14 % 2.96 4.12 147   GOAL >70% GOAL < 24 LPM GOAL <5 GOAL >240        Assessment: Pt not meeting objective benchmarks for compliance  Patient failing following subjective benchmarks: mask discomfort   Subjective measures: Pt reports he has been struggling to use CPAP all night due to frequent awakenings and being unable to fall back asleep on CPAP. He denies any discomforts other than occasionally feeling it is hard to breathe out. Sometimes he has difficulty getting a good seal after waking up to use the bathroom.  Changed EPR from 2 to 3. Advised pt to try to keep wearing mask.  Pt was given my contact information and instructed to reach out with any questions or concerns.     Action plan: pt to have 6 month [facility] visit  Patient has the following upcoming sleep appts:  Future Sleep Appointments         Provider Department    12/4/2023 3:30 PM (Arrive by 3:15 PM) Maru Adams, KASANDRA [facility] [place]          Total time spent on accessing and interpreting remote patient PAP therapy data  10 minutes    Total time spent counseling, coaching  and reviewing PAP therapy data with patient  7 minutes     [id number] this call  [id number] no  at 3 or 14 day [facility]

## 2023-11-10 ENCOUNTER — LAB (OUTPATIENT)
Dept: LAB | Facility: CLINIC | Age: 48
End: 2023-11-10
Payer: OTHER GOVERNMENT

## 2023-11-10 DIAGNOSIS — I25.10 CORONARY ARTERY CALCIFICATION: ICD-10-CM

## 2023-11-10 DIAGNOSIS — I25.10 ASCVD (ARTERIOSCLEROTIC CARDIOVASCULAR DISEASE): ICD-10-CM

## 2023-11-10 DIAGNOSIS — E78.5 HYPERLIPIDEMIA WITH TARGET LDL LESS THAN 70: ICD-10-CM

## 2023-11-10 LAB
ALT SERPL W P-5'-P-CCNC: 25 U/L (ref 0–70)
CHOLEST SERPL-MCNC: 125 MG/DL
HDLC SERPL-MCNC: 55 MG/DL
LDLC SERPL CALC-MCNC: 55 MG/DL
NONHDLC SERPL-MCNC: 70 MG/DL
TRIGL SERPL-MCNC: 77 MG/DL

## 2023-11-10 PROCEDURE — 84460 ALANINE AMINO (ALT) (SGPT): CPT

## 2023-11-10 PROCEDURE — 36415 COLL VENOUS BLD VENIPUNCTURE: CPT

## 2023-11-10 PROCEDURE — 80061 LIPID PANEL: CPT

## 2023-11-13 ENCOUNTER — DOCUMENTATION ONLY (OUTPATIENT)
Dept: SLEEP MEDICINE | Facility: CLINIC | Age: 48
End: 2023-11-13
Payer: OTHER GOVERNMENT

## 2023-11-13 NOTE — PROGRESS NOTES
DATE: 11/13/23  LOCATION OF MASK FITTING: [facility] [facility]  REASON FOR MASK FITTING: NEED DIFFERENT SIZE  DISCUSSED/VIEWED THE FOLLOWING MASKS: AIRFIT F30I WIDE, N30I WIDE    MASK AND SIZE SELECTED: N30I WIDE CUSHION  MASK CLARIFICATION NEEDED: N

## 2023-11-16 NOTE — PROGRESS NOTES
Cardiology Clinic Progress Note  Sunny Hayes MRN# [id number]   YOB: 1975 Age: 48 year old   Primary Cardiologist: Dr. Yip  Reason for visit: 2 month follow up             Assessment and Plan:       1. Hypertension, resistant, with improved control  -Renal artery ultrasound negative for renal artery stenosis  -Previously under significant stress and on Wellbutrin  -On olmesartan 40mg daily     2.  Family history of premature coronary artery disease       Coronary artery calcifications  -7/2023 stress echocardiogram negative for inducible ischemia  -8/2023 CT calcium score with total score of 2.14, recommendation for moderate intensity statin  -9/2023 initiated on rosuvastatin 5 mg daily after panel showed LDL was 109  -11/2023 repeat lipid panel showed an LDL 55    3.  Moderate obstructive sleep apnea  -Sleep study positive for sleep apnea and patient was fitted for a CPAP machine, he has been compliant with this    Patient overall is feeling well without recurrence of his left sided chest discomfort. He has continued to exercise with cardio and swimming. His hypertension remains well controlled and his leg cramps have resolved, however he continues to have dizziness following an hour of prolonged high heart rates, however I feel like this is related to extreme exertion given the fact he had a negative stress echocardiogram in July and a low CT calcium score.  He believes this started after he started the olmesartan.  I do think it be reasonable if his blood pressure remains well controlled, or even close slightly lower as he continues to make healthy lifestyle changes and uses his CPAP, to pull this back to 20 mg daily.  He does check his blood pressure at home and I advised him to contact us if that should occur.  He can also discuss this with his PCP at his January visit    His last lipid panel significant improvement following initiation of low-dose rosuvastatin without any side  effects.  His LDL now at goal.    Plan:  Continue olmesartan 40 mg daily  Continue rosuvastatin 5 mg daily    Follow up plan: Follow up with  in July 2024        History of Presenting Illness:    Sunny Hayes is a very pleasant 48 year old male with a history of hypertension, obstructive sleep apnea, gilbert's syndrome and strong family history of premature coronary disease.     Patient saw Dr. Chester Ward on 7/17/2023 following an a hospital admission the previous week for chest pain, at which time his troponin was negative and EKG did not demonstrate any ischemic changes.  He underwent a stress echocardiogram which was negative for ischemia.  He was quite hypertensive and his losartan was switched to olmesartan 40 mg daily.  A CT calcium score was ordered for further risk stratification given patient's strong family history of coronary artery disease.    Patient underwent a CT calcium score 8/28/23 which showed a total score of 2.14, placing patient in the 64th percentile when compared to age and gender matched control groups.    At our last visit in September 2023 his blood pressure had improved following stopping his wellbutrin and we decided to stop his chlorthalidone due to leg cramps. His lipid panel done 9/14/23 showed ALT 19, total cholesterol 182, HDL 51, , triglycerides 111.  We initiated low-dose rosuvastatin 5 mg daily for risk factor modification given his recent elevated CT calcium score.    Patient saw sleep medicine in late September 2023 and diagnosed with moderate obstructive sleep apnea.    Patient is here today for a 2 month follow up. Patient reports feeling good. When he exercises and gets his heart rate up in the 160's sustained for an hour, he gets dizzy and lightheaded. He is not having associated sensation of tachycardia, palpitations, syncope or pre-syncope. He has not had any further leg cramps since discontinuation of hydrochlorothiazide.     Patient denies  chest pain or chest tightness. Denies presyncopal symptoms.      Blood pressure 120/68 and HR 80 in clinic today.        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   Social History     Socioeconomic History    Marital status:      Spouse name: Not on file    Number of children: Not on file    Years of education: Not on file    Highest education level: Not on file   Occupational History    Not on file   Tobacco Use    Smoking status: Former     Types: Cigarettes     Quit date: 2003     Years since quittin.9    Smokeless tobacco: Former     Quit date: 2010   Vaping Use    Vaping Use: Never used   Substance and Sexual Activity    Alcohol use: Yes     Comment: 1-2 drinks per week    Drug use: Never    Sexual activity: Yes     Partners: Female   Other Topics Concern    Not on file   Social History Narrative    Not on file     Social Determinants of Health     Financial Resource Strain: Low Risk  (2023)    Overall Financial Resource Strain (CARDIA)     Difficulty of Paying Living Expenses: Not hard at all   Food Insecurity: No Food Insecurity (2023)    Hunger Vital Sign     Worried About Running Out of Food in the Last Year: Never true     Ran Out of Food in the Last Year: Never true   Transportation Needs: No Transportation Needs (2023)    PRAPARE - Transportation     Lack of Transportation (Medical): No     Lack of Transportation (Non-Medical): No   Physical Activity: Insufficiently Active (2023)    Exercise Vital Sign     Days of Exercise per Week: 4 days     Minutes of Exercise per Session: 30 min   Stress: Stress Concern Present (2023)    [employer] [place] [employer] - Occupational Stress Questionnaire     Feeling of Stress : Rather much   Social Connections: Moderately Isolated (2023)    Social Connection and Isolation Panel  "[NHANES]     Frequency of Communication with Friends and Family: More than three times a week     Frequency of Social Gatherings with Friends and Family: Never     Attends Shinto Services: Never     Active Member of Clubs or Organizations: No     Attends Club or Organization Meetings: Not on file     Marital Status:    Interpersonal Safety: Not on file   Housing Stability: Low Risk  (1/12/2023)    Housing Stability Vital Sign     Unable to Pay for Housing in the Last Year: No     Number of Places Lived in the Last Year: 1     Unstable Housing in the Last Year: No            Review of Systems:   Skin:        Eyes:       ENT:       Respiratory:  Negative    Cardiovascular:    fatigue;Positive for  Gastroenterology:      Genitourinary:       Musculoskeletal:       Neurologic:       Psychiatric:       Heme/Lymph/Imm:       Endocrine:              Physical Exam:   Vitals: /68   Pulse 80   Ht 1.778 m (5' 10\")   Wt 94.8 kg (209 lb)   BMI 29.99 kg/m     Wt Readings from Last 4 Encounters:   11/17/23 94.8 kg (209 lb)   09/26/23 96.6 kg (213 lb)   09/11/23 95.7 kg (211 lb)   08/14/23 95.3 kg (210 lb 1.6 oz)     GEN: well nourished, in no acute distress.  HEENT:  Pupils equal, round. Sclerae nonicteric.   NECK: Supple, no masses appreciated.   C/V:  Regular rate and rhythm, no murmur, rub or gallop.    RESP: Respirations are unlabored. Clear to auscultation bilaterally without wheezing, rales, or rhonchi.  GI: Abdomen soft, nontender.  EXTREM: No LE edema.  NEURO: Alert and oriented, cooperative.  SKIN: Warm and dry.        Data:     LIPID RESULTS:  Lab Results   Component Value Date    CHOL 125 11/10/2023    HDL 55 11/10/2023    LDL 55 11/10/2023    TRIG 77 11/10/2023     LIVER ENZYME RESULTS:  Lab Results   Component Value Date    AST 20 12/20/2021    ALT 25 11/10/2023     CBC RESULTS:  Lab Results   Component Value Date    WBC 8.6 07/08/2023    RBC 5.61 07/08/2023    HGB 11.4 (L) 07/08/2023    HCT 36.5 " "(L) 07/08/2023    MCV 65 (L) 07/08/2023    MCH 20.3 (L) 07/08/2023    MCHC 31.2 (L) 07/08/2023    RDW 16.5 (H) 07/08/2023     07/08/2023     BMP RESULTS:  Lab Results   Component Value Date     07/08/2023    POTASSIUM 3.5 07/08/2023    POTASSIUM 4.2 12/20/2021    CHLORIDE 101 07/08/2023    CHLORIDE 107 12/20/2021    CO2 27 07/08/2023    CO2 26 12/20/2021    ANIONGAP 10 07/08/2023    ANIONGAP 5 12/20/2021     (H) 07/08/2023    GLC 91 12/20/2021    BUN 10.9 07/08/2023    BUN 10 12/20/2021    CR 0.85 07/08/2023    GFRESTIMATED >90 07/08/2023    FERNANDO 9.6 07/08/2023      A1C RESULTS:  Lab Results   Component Value Date    A1C 5.0 07/08/2023     INR RESULTS:  No results found for: \"INR\"         Medications     Current Outpatient Medications   Medication Sig Dispense Refill    albuterol (PROAIR HFA/PROVENTIL HFA/VENTOLIN HFA) 108 (90 Base) MCG/ACT inhaler Inhale 2 puffs into the lungs every 6 hours as needed for shortness of breath, wheezing or cough 18 g 3    esomeprazole (NEXIUM) 40 MG DR capsule Take 1 capsule (40 mg) by mouth every morning (before breakfast) 90 capsule 3    fluticasone-salmeterol (ADVAIR) 500-50 MCG/ACT inhaler Inhale 1 puff into the lungs every 12 hours 60 each 3    levothyroxine (SYNTHROID/LEVOTHROID) 175 MCG tablet Take 1 tablet (175 mcg) by mouth daily 90 tablet 3    olmesartan (BENICAR) 40 MG tablet Take 1 tablet (40 mg) by mouth daily 90 tablet 3    rosuvastatin (CRESTOR) 5 MG tablet Take 1 tablet (5 mg) by mouth daily 90 tablet 3          Past Medical History     Past Medical History:   Diagnosis Date    ASCVD (arteriosclerotic cardiovascular disease) 09/15/2023    Gastroesophageal reflux disease     Hypertension     Hypothyroidism     Uncomplicated asthma      No past surgical history on file.  History reviewed. No pertinent family history.         Allergies   Ace inhibitors; Bicillin c-r,; and Penicillins        Janelle Heaton NP  [facility]  Pager: [phone]

## 2023-11-17 ENCOUNTER — OFFICE VISIT (OUTPATIENT)
Dept: CARDIOLOGY | Facility: CLINIC | Age: 48
End: 2023-11-17
Attending: NURSE PRACTITIONER
Payer: OTHER GOVERNMENT

## 2023-11-17 VITALS
DIASTOLIC BLOOD PRESSURE: 68 MMHG | HEART RATE: 80 BPM | WEIGHT: 209 LBS | SYSTOLIC BLOOD PRESSURE: 120 MMHG | BODY MASS INDEX: 29.92 KG/M2 | HEIGHT: 70 IN

## 2023-11-17 DIAGNOSIS — I25.10 CORONARY ARTERY CALCIFICATION: ICD-10-CM

## 2023-11-17 DIAGNOSIS — G47.33 OSA (OBSTRUCTIVE SLEEP APNEA): ICD-10-CM

## 2023-11-17 DIAGNOSIS — I25.10 ASCVD (ARTERIOSCLEROTIC CARDIOVASCULAR DISEASE): ICD-10-CM

## 2023-11-17 DIAGNOSIS — E78.5 HYPERLIPIDEMIA WITH TARGET LDL LESS THAN 70: ICD-10-CM

## 2023-11-17 PROCEDURE — 99214 OFFICE O/P EST MOD 30 MIN: CPT | Performed by: NURSE PRACTITIONER

## 2023-11-17 RX ORDER — ROSUVASTATIN CALCIUM 5 MG/1
5 TABLET, COATED ORAL DAILY
Qty: 90 TABLET | Refills: 3 | Status: SHIPPED | OUTPATIENT
Start: 2023-11-17

## 2023-11-17 NOTE — PATIENT INSTRUCTIONS
Today's Recommendations    Your cholesterol looks ideal and ALT was normal  Your blood pressure is still very well controlled after we stopped the HCTZ  Keep an eye on your blood pressure and if it is dropping lower than it is now, we could consider cutting back on your olmesartan  Continue all medications without changes.  Please follow up with Dr. Yip in July.    Please send a [employer] message or call [phone] to the RN team with questions or concerns.     Scheduling number [phone]  KASANDRA Knight, CNP

## 2023-11-17 NOTE — LETTER
11/17/2023    Bubba Powers MD  [id number]  [facility] [address]    RE: Sunny Hayes       Dear Colleague,     I had the pleasure of seeing Sunny Hayes in the [facility].  Cardiology Clinic Progress Note  Sunny Hayes MRN# [id number]   YOB: 1975 Age: 48 year old   Primary Cardiologist: Dr. Yip  Reason for visit: 2 month follow up             Assessment and Plan:       1. Hypertension, resistant, with improved control  -Renal artery ultrasound negative for renal artery stenosis  -Previously under significant stress and on Wellbutrin  -On olmesartan 40mg daily     2.  Family history of premature coronary artery disease       Coronary artery calcifications  -7/2023 stress echocardiogram negative for inducible ischemia  -8/2023 CT calcium score with total score of 2.14, recommendation for moderate intensity statin  -9/2023 initiated on rosuvastatin 5 mg daily after panel showed LDL was 109  -11/2023 repeat lipid panel showed an LDL 55    3.  Moderate obstructive sleep apnea  -Sleep study positive for sleep apnea and patient was fitted for a CPAP machine, he has been compliant with this    Patient overall is feeling well without recurrence of his left sided chest discomfort. He has continued to exercise with cardio and swimming. His hypertension remains well controlled and his leg cramps have resolved, however he continues to have dizziness following an hour of prolonged high heart rates, however I feel like this is related to extreme exertion given the fact he had a negative stress echocardiogram in July and a low CT calcium score.  He believes this started after he started the olmesartan.  I do think it be reasonable if his blood pressure remains well controlled, or even close slightly lower as he continues to make healthy lifestyle changes and uses his CPAP, to pull this back to 20 mg daily.  He does check his blood pressure at home and I advised him to contact  us if that should occur.  He can also discuss this with his PCP at his January visit    His last lipid panel significant improvement following initiation of low-dose rosuvastatin without any side effects.  His LDL now at goal.    Plan:  Continue olmesartan 40 mg daily  Continue rosuvastatin 5 mg daily    Follow up plan: Follow up with  in July 2024        History of Presenting Illness:    Sunny Hayes is a very pleasant 48 year old male with a history of hypertension, obstructive sleep apnea, gilbert's syndrome and strong family history of premature coronary disease.     Patient saw Dr. Chester Ward on 7/17/2023 following an a hospital admission the previous week for chest pain, at which time his troponin was negative and EKG did not demonstrate any ischemic changes.  He underwent a stress echocardiogram which was negative for ischemia.  He was quite hypertensive and his losartan was switched to olmesartan 40 mg daily.  A CT calcium score was ordered for further risk stratification given patient's strong family history of coronary artery disease.    Patient underwent a CT calcium score 8/28/23 which showed a total score of 2.14, placing patient in the 64th percentile when compared to age and gender matched control groups.    At our last visit in September 2023 his blood pressure had improved following stopping his wellbutrin and we decided to stop his chlorthalidone due to leg cramps. His lipid panel done 9/14/23 showed ALT 19, total cholesterol 182, HDL 51, , triglycerides 111.  We initiated low-dose rosuvastatin 5 mg daily for risk factor modification given his recent elevated CT calcium score.    Patient saw sleep medicine in late September 2023 and diagnosed with moderate obstructive sleep apnea.    Patient is here today for a 2 month follow up. Patient reports feeling good. When he exercises and gets his heart rate up in the 160's sustained for an hour, he gets dizzy and lightheaded. He  is not having associated sensation of tachycardia, palpitations, syncope or pre-syncope. He has not had any further leg cramps since discontinuation of hydrochlorothiazide.     Patient denies chest pain or chest tightness. Denies presyncopal symptoms.      Blood pressure 120/68 and HR 80 in clinic today.        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   Social History     Socioeconomic History    Marital status:      Spouse name: Not on file    Number of children: Not on file    Years of education: Not on file    Highest education level: Not on file   Occupational History    Not on file   Tobacco Use    Smoking status: Former     Types: Cigarettes     Quit date: 2003     Years since quittin.9    Smokeless tobacco: Former     Quit date: 2010   Vaping Use    Vaping Use: Never used   Substance and Sexual Activity    Alcohol use: Yes     Comment: 1-2 drinks per week    Drug use: Never    Sexual activity: Yes     Partners: Female   Other Topics Concern    Not on file   Social History Narrative    Not on file     Social Determinants of Health     Financial Resource Strain: Low Risk  (2023)    Overall Financial Resource Strain (CARDIA)     Difficulty of Paying Living Expenses: Not hard at all   Food Insecurity: No Food Insecurity (2023)    Hunger Vital Sign     Worried About Running Out of Food in the Last Year: Never true     Ran Out of Food in the Last Year: Never true   Transportation Needs: No Transportation Needs (2023)    PRAPARE - Transportation     Lack of Transportation (Medical): No     Lack of Transportation (Non-Medical): No   Physical Activity: Insufficiently Active (2023)    Exercise Vital Sign     Days of Exercise per Week: 4 days     Minutes of Exercise per Session: 30 min   Stress: Stress Concern Present (2023)    [facility] of  "Occupational Health - Occupational Stress Questionnaire     Feeling of Stress : Rather much   Social Connections: Moderately Isolated (1/12/2023)    Social Connection and Isolation Panel [NHANES]     Frequency of Communication with Friends and Family: More than three times a week     Frequency of Social Gatherings with Friends and Family: Never     Attends Caodaism Services: Never     Active Member of Clubs or Organizations: No     Attends Club or Organization Meetings: Not on file     Marital Status:    Interpersonal Safety: Not on file   Housing Stability: Low Risk  (1/12/2023)    Housing Stability Vital Sign     Unable to Pay for Housing in the Last Year: No     Number of Places Lived in the Last Year: 1     Unstable Housing in the Last Year: No            Review of Systems:   Skin:        Eyes:       ENT:       Respiratory:  Negative    Cardiovascular:    fatigue;Positive for  Gastroenterology:      Genitourinary:       Musculoskeletal:       Neurologic:       Psychiatric:       Heme/Lymph/Imm:       Endocrine:              Physical Exam:   Vitals: /68   Pulse 80   Ht 1.778 m (5' 10\")   Wt 94.8 kg (209 lb)   BMI 29.99 kg/m     Wt Readings from Last 4 Encounters:   11/17/23 94.8 kg (209 lb)   09/26/23 96.6 kg (213 lb)   09/11/23 95.7 kg (211 lb)   08/14/23 95.3 kg (210 lb 1.6 oz)     GEN: well nourished, in no acute distress.  HEENT:  Pupils equal, round. Sclerae nonicteric.   NECK: Supple, no masses appreciated.   C/V:  Regular rate and rhythm, no murmur, rub or gallop.    RESP: Respirations are unlabored. Clear to auscultation bilaterally without wheezing, rales, or rhonchi.  GI: Abdomen soft, nontender.  EXTREM: No LE edema.  NEURO: Alert and oriented, cooperative.  SKIN: Warm and dry.        Data:     LIPID RESULTS:  Lab Results   Component Value Date    CHOL 125 11/10/2023    HDL 55 11/10/2023    LDL 55 11/10/2023    TRIG 77 11/10/2023     LIVER ENZYME RESULTS:  Lab Results   Component " "Value Date    AST 20 12/20/2021    ALT 25 11/10/2023     CBC RESULTS:  Lab Results   Component Value Date    WBC 8.6 07/08/2023    RBC 5.61 07/08/2023    HGB 11.4 (L) 07/08/2023    HCT 36.5 (L) 07/08/2023    MCV 65 (L) 07/08/2023    MCH 20.3 (L) 07/08/2023    MCHC 31.2 (L) 07/08/2023    RDW 16.5 (H) 07/08/2023     07/08/2023     BMP RESULTS:  Lab Results   Component Value Date     07/08/2023    POTASSIUM 3.5 07/08/2023    POTASSIUM 4.2 12/20/2021    CHLORIDE 101 07/08/2023    CHLORIDE 107 12/20/2021    CO2 27 07/08/2023    CO2 26 12/20/2021    ANIONGAP 10 07/08/2023    ANIONGAP 5 12/20/2021     (H) 07/08/2023    GLC 91 12/20/2021    BUN 10.9 07/08/2023    BUN 10 12/20/2021    CR 0.85 07/08/2023    GFRESTIMATED >90 07/08/2023    FERNANDO 9.6 07/08/2023      A1C RESULTS:  Lab Results   Component Value Date    A1C 5.0 07/08/2023     INR RESULTS:  No results found for: \"INR\"         Medications     Current Outpatient Medications   Medication Sig Dispense Refill    albuterol (PROAIR HFA/PROVENTIL HFA/VENTOLIN HFA) 108 (90 Base) MCG/ACT inhaler Inhale 2 puffs into the lungs every 6 hours as needed for shortness of breath, wheezing or cough 18 g 3    esomeprazole (NEXIUM) 40 MG DR capsule Take 1 capsule (40 mg) by mouth every morning (before breakfast) 90 capsule 3    fluticasone-salmeterol (ADVAIR) 500-50 MCG/ACT inhaler Inhale 1 puff into the lungs every 12 hours 60 each 3    levothyroxine (SYNTHROID/LEVOTHROID) 175 MCG tablet Take 1 tablet (175 mcg) by mouth daily 90 tablet 3    olmesartan (BENICAR) 40 MG tablet Take 1 tablet (40 mg) by mouth daily 90 tablet 3    rosuvastatin (CRESTOR) 5 MG tablet Take 1 tablet (5 mg) by mouth daily 90 tablet 3          Past Medical History     Past Medical History:   Diagnosis Date    ASCVD (arteriosclerotic cardiovascular disease) 09/15/2023    Gastroesophageal reflux disease     Hypertension     Hypothyroidism     Uncomplicated asthma      No past surgical history on " file.  History reviewed. No pertinent family history.         Allergies   Ace inhibitors; Bicillin c-r,; and Penicillins        Janelle Heaton NP  [facility]  Pager: [phone]       Thank you for allowing me to participate in the care of your patient.      Sincerely,     Janelle Heaton NP     [facility]  cc:   Janelle Heaton NP  [address]  [place] [address]

## 2023-12-20 ASSESSMENT — SLEEP AND FATIGUE QUESTIONNAIRES
HOW LIKELY ARE YOU TO NOD OFF OR FALL ASLEEP WHEN YOU ARE A PASSENGER IN A CAR FOR AN HOUR WITHOUT A BREAK: SLIGHT CHANCE OF DOZING
HOW LIKELY ARE YOU TO NOD OFF OR FALL ASLEEP WHILE WATCHING TV: SLIGHT CHANCE OF DOZING
HOW LIKELY ARE YOU TO NOD OFF OR FALL ASLEEP WHILE SITTING QUIETLY AFTER LUNCH WITHOUT ALCOHOL: SLIGHT CHANCE OF DOZING
HOW LIKELY ARE YOU TO NOD OFF OR FALL ASLEEP WHILE SITTING AND TALKING TO SOMEONE: SLIGHT CHANCE OF DOZING
HOW LIKELY ARE YOU TO NOD OFF OR FALL ASLEEP WHILE SITTING INACTIVE IN A PUBLIC PLACE: SLIGHT CHANCE OF DOZING
HOW LIKELY ARE YOU TO NOD OFF OR FALL ASLEEP WHILE SITTING AND READING: MODERATE CHANCE OF DOZING
HOW LIKELY ARE YOU TO NOD OFF OR FALL ASLEEP WHILE LYING DOWN TO REST IN THE AFTERNOON WHEN CIRCUMSTANCES PERMIT: HIGH CHANCE OF DOZING
HOW LIKELY ARE YOU TO NOD OFF OR FALL ASLEEP IN A CAR, WHILE STOPPED FOR A FEW MINUTES IN TRAFFIC: WOULD NEVER DOZE

## 2023-12-21 ENCOUNTER — VIRTUAL VISIT (OUTPATIENT)
Dept: SLEEP MEDICINE | Facility: CLINIC | Age: 48
End: 2023-12-21
Payer: OTHER GOVERNMENT

## 2023-12-21 VITALS — HEIGHT: 72 IN | WEIGHT: 205 LBS | BODY MASS INDEX: 27.77 KG/M2

## 2023-12-21 DIAGNOSIS — F43.12 NIGHTMARES ASSOCIATED WITH CHRONIC POST-TRAUMATIC STRESS DISORDER: ICD-10-CM

## 2023-12-21 DIAGNOSIS — F51.5 NIGHTMARES ASSOCIATED WITH CHRONIC POST-TRAUMATIC STRESS DISORDER: ICD-10-CM

## 2023-12-21 DIAGNOSIS — G47.33 OSA (OBSTRUCTIVE SLEEP APNEA): Primary | ICD-10-CM

## 2023-12-21 PROCEDURE — 99213 OFFICE O/P EST LOW 20 MIN: CPT | Mod: VID

## 2023-12-21 RX ORDER — PRAZOSIN HYDROCHLORIDE 1 MG/1
1 CAPSULE ORAL AT BEDTIME
Qty: 30 CAPSULE | Refills: 0 | Status: SHIPPED | OUTPATIENT
Start: 2023-12-21 | End: 2024-02-02 | Stop reason: SINTOL

## 2023-12-21 ASSESSMENT — PAIN SCALES - GENERAL: PAINLEVEL: MILD PAIN (2)

## 2023-12-21 NOTE — NURSING NOTE
Is the patient currently in the state of [place]? YES    Visit mode:VIDEO    If the visit is dropped, the patient can be reconnected by: VIDEO VISIT: Send to e-mail at: [email or web address]    Will anyone else be joining the visit? NO  (If patient encounters technical issues they should call [phone])    How would you like to obtain your AVS? MyChart    Are changes needed to the allergy or medication list? No    Reason for visit: ISAAC RODRÍGUEZF

## 2023-12-21 NOTE — PATIENT INSTRUCTIONS
Dream Rehearsal: Write down an average nightmare in as much detail as you can remember (this step is actually optional if it is anxiety provoking to do this). Then, rewrite the dream changing the negative aspects into positive aspects. Alternatively, you can come up with an entirely new dream (it can be literally anything as long as the content is pleasant). Spend 1-2 minutes, several times per day, imagining the pleasant dream.  Walk yourself through the entire dream in your mind as if you were dreaming it. Do this before bed as well as other times of the day. Create new dreams no more than twice per week. Sometimes practicing more pleasant content like that can change the emotion and content of your dreams.

## 2023-12-21 NOTE — PROGRESS NOTES
Virtual Visit Details    Type of service: Video Visit   Start time: 3:51 PM  End time: 4:04 PM  Originating Location (pt. Location): Home    Distant Location (provider location): Off-site  Platform used for Video Visit: [facility]    Sleep Apnea - Follow-up Visit:    Impression/Plan:   (G47.33) GUCCI (obstructive sleep apnea) (F51.5, F43.12) Nightmares associated with chronic post-traumatic stress disorder  Comment: Sunny presents for follow-up of his moderate sleep apnea, managed with CPAP. He received his machine on 09/27/2023 but has no compliance requirements. He can fall asleep with CPAP on and he is able to sleep for a couple hours. He then takes it off in the night after he wakes up from a nightmare. He has nightmares every night, and they are related to past trauma from the . He is scheduled to see Dr. Abraham Dennison in February, and he is going to make a mental health appointment as well. Sunny has gasp arousals when awakening from a nightmare. He has tried a few different mask styles to find one that doesn't leak or make him feel claustrophobic. His download shows his apnea is adequately treated with a residual AHI of 3.3 events/hr. His leak is acceptable.    Plan: prazosin (MINIPRESS) 1 MG capsule, [facility]  His pressure settings were changed from 5-15 cmH2O to CPAP 8 cmH2O based on his 95th percentile pressure to help with comfort. A prescription was written for new supplies. We reviewed recommendations for cleaning and replacing supplies. Sunny is interested in starting prazosin for treatment of his nightmares associated with PTSD. We will start at 1 mg at bedtime. 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 He was also provided information regarding dream rehearsal therapy.       Sunny Hayes will follow up in about 1 year(s). I will check in via [facility] to see how he is tolerating the pressure change and prazosin.   Maru Adams, KASANDRA CNP     Total time spent  reviewing medical records, history and physical examination, review of previous testing and interpretation as well as documentation on this date: 20 minutes    CC: Bubba Powers MD    History of Present Illness:  Chief Complaint   Patient presents with    RECHECK     Sunny Hayes presents for follow-up of his moderate sleep apnea, managed with CPAP. He received his machine on 09/27/2023 but has no compliance requirements. He can fall asleep with CPAP on and he is able to sleep a couple hours. He takes it off in the night after he wakes up from a nightmare. He has nightmares every night, and they are related to past trauma.     9/10/2023 [facility] (210.0 lbs) - AHI 24.3, RDI 26.4, Supine AHI 35.4, REM AHI 41.9, Low O2 82.0%, Time Spent ?88% 5.7 minutes / Time Spent ?89% 11.1 minutes. No abnormal sleep related behaviors were noted.     DME: [facility]    Do you use a CPAP Machine at home: Yes  Overall, on a scale of 0-10 how would you rate your CPAP (0 poor, 10 great): 5    What type of mask do you use: Under the nose and over the mouth   Is your mask comfortable: No  If not, why:      Is your mask leaking: Yes  If yes, where do you feel it: Eyes  How many night per week does the mask leak (0-7): 5    Do you notice snoring with mask on: No  Do you notice gasping arousals with mask on: Yes  Are you having significant oral or nasal dryness: Yes  Is the pressure setting comfortable: Yes  If not, why:      What is your typical bedtime: 9pm  How long does it take you to go to sleep on PAP therapy: 30-60 minutes  What time do you typically get out of bed for the day: 5am  How many hours on average per night are you using PAP therapy: 3-4  How many hours are you sleeping per night: 4-6  Do you feel well rested in the morning: No    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     ResMed AirSense 10  Auto-PAP 5.0 - 15.0 cmH2O 30 day usage data: 11/18/2023-12/17/2023    7% of  days with > 4 hours of use. 0/30 days with no use.   Average use 2 hours 30 minutes per day.   95%ile Leak 4.4 L/min.   CPAP 95% pressure 8.8 cm.   AHI 3.3 events per hour.     EPWORTH SLEEPINESS SCALE         12/20/2023     4:15 PM    Phoenix Sleepiness Scale ( CHACHA Alan  [id number]<br>ESS - [place]/English - Final version - 21 Nov 07 - [facility] [employer] [place].)   Sitting and reading Moderate chance of dozing   Watching TV Slight chance of dozing   Sitting, inactive in a public place (e.g. a theatre or a meeting) Slight chance of dozing   As a passenger in a car for an hour without a break Slight chance of dozing   Lying down to rest in the afternoon when circumstances permit High chance of dozing   Sitting and talking to someone Slight chance of dozing   Sitting quietly after a lunch without alcohol Slight chance of dozing   In a car, while stopped for a few minutes in traffic Would never doze   Phoenix Score (MC) 10   Phoenix Score (Sleep) 10       INSOMNIA SEVERITY INDEX (ALFONSO)          12/20/2023     4:09 PM   Insomnia Severity Index (ALFONSO)   Difficulty falling asleep 4   Difficulty staying asleep 4   Problems waking up too early 4   How SATISFIED/DISSATISFIED are you with your CURRENT sleep pattern? 4   How NOTICEABLE to others do you think your sleep problem is in terms of impairing the quality of your life? 4   How WORRIED/DISTRESSED are you about your current sleep problem? 4   To what extent do you consider your sleep problem to INTERFERE with your daily functioning (e.g. daytime fatigue, mood, ability to function at work/daily chores, concentration, memory, mood, etc.) CURRENTLY? 4   ALFONSO Total Score 28       Guidelines for Scoring/Interpretation:  Total score categories:  0-7 = No clinically significant insomnia   8-14 = Subthreshold insomnia   15-21 = Clinical insomnia (moderate severity)  22-28 = Clinical insomnia (severe)  Used via courtesy of www.Social Projectealth.va.gov with permission from Leobardo Miller PhD.,  [facility]      Past medical/surgical history, family history, social history, medications and allergies were reviewed.        Problem List:  Patient Active Problem List    Diagnosis Date Noted    ASCVD (arteriosclerotic cardiovascular disease) 09/15/2023     Priority: Medium    GUCCI (obstructive sleep apnea) 07/17/2023     Priority: Medium    Family history of coronary artery disease occurring prior to 55 years of age 07/17/2023     Priority: Medium    Chest pain, unspecified type 07/08/2023     Priority: Medium    Precordial pain 07/08/2023     Priority: Medium    Gilbert's syndrome 01/20/2022     Priority: Medium    John's esophagus without dysplasia  - lifelong ppi 02/25/2021     Priority: Medium     Formatting of this note might be different from the original.  Repeat scope winter 2021      Essential hypertension 01/02/2019     Priority: Medium    Hypothyroidism 06/07/2003     Priority: Medium     Formatting of this note might be different from the original.  Hypothyroidism Acquired      Asthma 06/07/2003     Priority: Medium     Formatting of this note might be different from the original.  Asthma  NOS      Beta thalassemia - baseline Hgb 10 1974     Priority: Medium        Ht 1.829 m (6')   Wt 93 kg (205 lb)   BMI 27.80 kg/m      Maru Adams, APRN CNP 12/21/2023

## 2024-02-01 SDOH — HEALTH STABILITY: PHYSICAL HEALTH: ON AVERAGE, HOW MANY DAYS PER WEEK DO YOU ENGAGE IN MODERATE TO STRENUOUS EXERCISE (LIKE A BRISK WALK)?: 4 DAYS

## 2024-02-01 SDOH — HEALTH STABILITY: PHYSICAL HEALTH: ON AVERAGE, HOW MANY MINUTES DO YOU ENGAGE IN EXERCISE AT THIS LEVEL?: 50 MIN

## 2024-02-01 ASSESSMENT — ASTHMA QUESTIONNAIRES
QUESTION_4 LAST FOUR WEEKS HOW OFTEN HAVE YOU USED YOUR RESCUE INHALER OR NEBULIZER MEDICATION (SUCH AS ALBUTEROL): ONCE A WEEK OR LESS
QUESTION_1 LAST FOUR WEEKS HOW MUCH OF THE TIME DID YOUR ASTHMA KEEP YOU FROM GETTING AS MUCH DONE AT WORK, SCHOOL OR AT HOME: NONE OF THE TIME
QUESTION_2 LAST FOUR WEEKS HOW OFTEN HAVE YOU HAD SHORTNESS OF BREATH: NOT AT ALL
QUESTION_5 LAST FOUR WEEKS HOW WOULD YOU RATE YOUR ASTHMA CONTROL: WELL CONTROLLED
ACT_TOTALSCORE: 22
ACT_TOTALSCORE: 22
QUESTION_3 LAST FOUR WEEKS HOW OFTEN DID YOUR ASTHMA SYMPTOMS (WHEEZING, COUGHING, SHORTNESS OF BREATH, CHEST TIGHTNESS OR PAIN) WAKE YOU UP AT NIGHT OR EARLIER THAN USUAL IN THE MORNING: ONCE OR TWICE

## 2024-02-01 ASSESSMENT — SOCIAL DETERMINANTS OF HEALTH (SDOH): HOW OFTEN DO YOU GET TOGETHER WITH FRIENDS OR RELATIVES?: NEVER

## 2024-02-02 ENCOUNTER — OFFICE VISIT (OUTPATIENT)
Dept: FAMILY MEDICINE | Facility: CLINIC | Age: 49
End: 2024-02-02
Payer: OTHER GOVERNMENT

## 2024-02-02 VITALS
DIASTOLIC BLOOD PRESSURE: 72 MMHG | SYSTOLIC BLOOD PRESSURE: 118 MMHG | HEART RATE: 82 BPM | BODY MASS INDEX: 27.09 KG/M2 | HEIGHT: 72 IN | RESPIRATION RATE: 14 BRPM | TEMPERATURE: 98.4 F | WEIGHT: 200 LBS | OXYGEN SATURATION: 98 %

## 2024-02-02 DIAGNOSIS — G47.33 OSA (OBSTRUCTIVE SLEEP APNEA): ICD-10-CM

## 2024-02-02 DIAGNOSIS — Z00.00 ROUTINE GENERAL MEDICAL EXAMINATION AT A HEALTH CARE FACILITY: Primary | ICD-10-CM

## 2024-02-02 DIAGNOSIS — I10 ESSENTIAL HYPERTENSION: ICD-10-CM

## 2024-02-02 DIAGNOSIS — J45.40 MODERATE PERSISTENT ASTHMA WITHOUT COMPLICATION: ICD-10-CM

## 2024-02-02 DIAGNOSIS — K22.70 BARRETT'S ESOPHAGUS WITHOUT DYSPLASIA: ICD-10-CM

## 2024-02-02 DIAGNOSIS — Z12.5 SCREENING FOR PROSTATE CANCER: ICD-10-CM

## 2024-02-02 DIAGNOSIS — E03.9 HYPOTHYROIDISM, UNSPECIFIED TYPE: ICD-10-CM

## 2024-02-02 PROBLEM — R07.2 PRECORDIAL PAIN: Status: RESOLVED | Noted: 2023-07-08 | Resolved: 2024-02-02

## 2024-02-02 PROBLEM — R07.9 CHEST PAIN, UNSPECIFIED TYPE: Status: RESOLVED | Noted: 2023-07-08 | Resolved: 2024-02-02

## 2024-02-02 PROCEDURE — 36415 COLL VENOUS BLD VENIPUNCTURE: CPT | Performed by: PHYSICIAN ASSISTANT

## 2024-02-02 PROCEDURE — G0103 PSA SCREENING: HCPCS | Performed by: PHYSICIAN ASSISTANT

## 2024-02-02 PROCEDURE — 90677 PCV20 VACCINE IM: CPT | Performed by: PHYSICIAN ASSISTANT

## 2024-02-02 PROCEDURE — 80048 BASIC METABOLIC PNL TOTAL CA: CPT | Performed by: PHYSICIAN ASSISTANT

## 2024-02-02 PROCEDURE — 99396 PREV VISIT EST AGE 40-64: CPT | Mod: 25 | Performed by: PHYSICIAN ASSISTANT

## 2024-02-02 PROCEDURE — 84443 ASSAY THYROID STIM HORMONE: CPT | Performed by: PHYSICIAN ASSISTANT

## 2024-02-02 PROCEDURE — 84439 ASSAY OF FREE THYROXINE: CPT | Performed by: PHYSICIAN ASSISTANT

## 2024-02-02 PROCEDURE — 99214 OFFICE O/P EST MOD 30 MIN: CPT | Mod: 25 | Performed by: PHYSICIAN ASSISTANT

## 2024-02-02 PROCEDURE — 90471 IMMUNIZATION ADMIN: CPT | Performed by: PHYSICIAN ASSISTANT

## 2024-02-02 RX ORDER — ESOMEPRAZOLE MAGNESIUM 40 MG/1
40 CAPSULE, DELAYED RELEASE ORAL
Qty: 90 CAPSULE | Refills: 3 | Status: SHIPPED | OUTPATIENT
Start: 2024-02-02

## 2024-02-02 RX ORDER — FLUTICASONE PROPIONATE AND SALMETEROL 500; 50 UG/1; UG/1
1 POWDER RESPIRATORY (INHALATION) EVERY 12 HOURS
Qty: 60 EACH | Refills: 5 | Status: SHIPPED | OUTPATIENT
Start: 2024-02-02 | End: 2024-08-13

## 2024-02-02 RX ORDER — LEVOTHYROXINE SODIUM 175 UG/1
175 TABLET ORAL DAILY
Qty: 90 TABLET | Refills: 3 | Status: SHIPPED | OUTPATIENT
Start: 2024-02-02 | End: 2024-03-26 | Stop reason: DRUGHIGH

## 2024-02-02 RX ORDER — ALBUTEROL SULFATE 90 UG/1
2 AEROSOL, METERED RESPIRATORY (INHALATION) EVERY 6 HOURS PRN
Qty: 18 G | Refills: 3 | Status: SHIPPED | OUTPATIENT
Start: 2024-02-02

## 2024-02-02 ASSESSMENT — PAIN SCALES - GENERAL: PAINLEVEL: NO PAIN (0)

## 2024-02-02 NOTE — PATIENT INSTRUCTIONS
"Eating Healthy Foods: Care Instructions  With every meal, you can make healthy food choices. Try to eat a variety of fruits, vegetables, whole grains, lean proteins, and low-fat dairy products. This can help you get the right balance of nutrients, including vitamins and minerals. Small changes add up over time. You can start by adding one healthy food to your meals each day.    Try to make half your plate fruits and vegetables, one-fourth whole grains, and one-fourth lean proteins. Try including dairy with your meals.   Eat more fruits and vegetables. Try to have them with most meals and snacks.   Foods for healthy eating    Fruits    These can be fresh, frozen, canned, or dried.  Try to choose whole fruit rather than fruit juice.  Eat a variety of colors.    Vegetables    These can be fresh, frozen, canned, or dried.  Beans, peas, and lentils count too.    Whole grains    Choose whole-grain breads, cereals, and noodles.  Try brown rice.    Lean proteins    These can include lean meat, poultry, fish, and eggs.  You can also have tofu, beans, peas, lentils, nuts, and seeds.    Dairy    Try milk, yogurt, and cheese.  Choose low-fat or fat-free when you can.  If you need to, use lactose-free milk or fortified plant-based milk products, such as soy milk.    Water    Drink water when you're thirsty.  Limit sugar-sweetened drinks, including soda, fruit drinks, and sports drinks.  Where can you learn more?  Go to https://www.Zumeo.com.net/patiented  Enter T756 in the search box to learn more about \"Eating Healthy Foods: Care Instructions.\"  Current as of: February 28, 2023               Content Version: 13.8    3475-9548 [employer].   Care instructions adapted under license by your healthcare professional. If you have questions about a medical condition or this instruction, always ask your healthcare professional. [employer] disclaims any warranty or liability for your use of this " information.      Relationships for Good Health  Relationships are important for our health and happiness. Social isolation, loneliness and lack of support are bad for your health. Studies show that loneliness can harm health and limit your life span as much as high blood pressure and smoking.   Take some time to reflect on your relationships. Then answer these questions:  Are there people in your life that cause you stress or drain your energy? What can you do to set limits?  ________________________________________________________________________________________________________________________________________________________________________________________________________________________________________________________________________________________________________________________________________________  Who do you enjoy spending time with? Who can you go to for support?  ________________________________________________________________________________________________________________________________________________________________________________________________________________________________________________________________________________________________________________________________________________  What can you do to improve your relationships with others?  __________________________________________________________________________________________________________________________________________________________________________________________________________________  ______________________________________________________________________________________________________________________________  What do you like most about your relationships with  others?  ________________________________________________________________________________________________________________________________________________________________________________________________________________________________________________________________________________________________________________________________________________  My goal: ______________________________________________________________________  I will ______________________________________________________________________________________________________________________________________________________________________________________________    For informational purposes only. Not to replace the advice of your health care provider. Copyright   2018 [facility]. All rights reserved. Clinically reviewed by [facility]  Team. [employer] 468944 - Rev 04/21.     A Good Support System Is Important (02:04)  Your health professional recommends that you watch this short online health video.  Learn how to connect with family, friends, and others for support with health issues.  Purpose:  Teaches how to reach out to family, friends, and groups for support when managing a health problem.  Goal:  User will learn how a good support system can improve confidence to manage health and live well.     How to watch the video    Scan the QR code   OR Visit the website    https://link.HappyBox.net/r/Bhzbeida9bkmt   Current as of: June 25, 2023               Content Version: 13.8    3564-7579 [employer]per[employer].   Care instructions adapted under license by your healthcare professional. If you have questions about a medical condition or this instruction, always ask your healthcare professional. [employer] disclaims any warranty or liability for your use of this information.      Learning About Stress  What is stress?     Stress is your body's response to a hard situation. Your body can have a physical, emotional, or  mental response. Stress is a fact of life for most people, and it affects everyone differently. What causes stress for you may not be stressful for someone else.  A lot of things can cause stress. You may feel stress when you go on a job interview, take a test, or run a race. This kind of short-term stress is normal and even useful. It can help you if you need to work hard or react quickly. For example, stress can help you finish an important job on time.  Long-term stress is caused by ongoing stressful situations or events. Examples of long-term stress include long-term health problems, ongoing problems at work, or conflicts in your family. Long-term stress can harm your health.  How does stress affect your health?  When you are stressed, your body responds as though you are in danger. It makes hormones that speed up your heart, make you breathe faster, and give you a burst of energy. This is called the fight-or-flight stress response. If the stress is over quickly, your body goes back to normal and no harm is done.  But if stress happens too often or lasts too long, it can have bad effects. Long-term stress can make you more likely to get sick, and it can make symptoms of some diseases worse. If you tense up when you are stressed, you may develop neck, shoulder, or low back pain. Stress is linked to high blood pressure and heart disease.  Stress also harms your emotional health. It can make you griffin, tense, or depressed. Your relationships may suffer, and you may not do well at work or school.  What can you do to manage stress?  You can try these things to help manage stress:   Do something active. Exercise or activity can help reduce stress. Walking is a great way to get started. Even everyday activities such as housecleaning or yard work can help.  Try yoga or piyush chi. These techniques combine exercise and meditation. You may need some training at first to learn them.  Do something you enjoy. For example,  "listen to music or go to a movie. Practice your hobby or do volunteer work.  Meditate. This can help you relax, because you are not worrying about what happened before or what may happen in the future.  Do guided imagery. Imagine yourself in any setting that helps you feel calm. You can use online videos, books, or a teacher to guide you.  Do breathing exercises. For example:  From a standing position, bend forward from the waist with your knees slightly bent. Let your arms dangle close to the floor.  Breathe in slowly and deeply as you return to a standing position. Roll up slowly and lift your head last.  Hold your breath for just a few seconds in the standing position.  Breathe out slowly and bend forward from the waist.  Let your feelings out. Talk, laugh, cry, and express anger when you need to. Talking with supportive friends or family, a counselor, or a quan leader about your feelings is a healthy way to relieve stress. Avoid discussing your feelings with people who make you feel worse.  Write. It may help to write about things that are bothering you. This helps you find out how much stress you feel and what is causing it. When you know this, you can find better ways to cope.  What can you do to prevent stress?  You might try some of these things to help prevent stress:  Manage your time. This helps you find time to do the things you want and need to do.  Get enough sleep. Your body recovers from the stresses of the day while you are sleeping.  Get support. Your family, friends, and community can make a difference in how you experience stress.  Limit your news feed. Avoid or limit time on social media or news that may make you feel stressed.  Do something active. Exercise or activity can help reduce stress. Walking is a great way to get started.  Where can you learn more?  Go to https://www.healthwise.net/patiented  Enter N032 in the search box to learn more about \"Learning About Stress.\"  Current as of: " February 26, 2023               Content Version: 13.8    0911-4249 [employer].   Care instructions adapted under license by your healthcare professional. If you have questions about a medical condition or this instruction, always ask your healthcare professional. [employer] disclaims any warranty or liability for your use of this information.      Preventive Care Advice   This is general advice given by our system to help you stay healthy. However, your care team may have specific advice just for you. Please talk to your care team about your preventive care needs.  Nutrition  Eat 5 or more servings of fruits and vegetables each day.  Try wheat bread, brown rice and whole grain pasta (instead of white bread, rice, and pasta).  Get enough calcium and vitamin D. Check the label on foods and aim for 100% of the RDA (recommended daily allowance).  Lifestyle  Exercise at least 150 minutes each week  (30 minutes a day, 5 days a week).  Do muscle strengthening activities 2 days a week. These help control your weight and prevent disease.  No smoking.  Wear sunscreen to prevent skin cancer.  Have a dental exam and cleaning every 6 months.  Yearly exams  See your health care team every year to talk about:  Any changes in your health.  Any medicines your care team has prescribed.  Preventive care, family planning, and ways to prevent chronic diseases.  Shots (vaccines)   HPV shots (up to age 26), if you've never had them before.  Hepatitis B shots (up to age 59), if you've never had them before.  COVID-19 shot: Get this shot when it's due.  Flu shot: Get a flu shot every year.  Tetanus shot: Get a tetanus shot every 10 years.  Pneumococcal, hepatitis A, and RSV shots: Ask your care team if you need these based on your risk.  Shingles shot (for age 50 and up)  General health tests  Diabetes screening:  Starting at age 35, Get screened for diabetes at least every 3 years.  If you are younger than age  35, ask your care team if you should be screened for diabetes.  Cholesterol test: At age 39, start having a cholesterol test every 5 years, or more often if advised.  Bone density scan (DEXA): At age 50, ask your care team if you should have this scan for osteoporosis (brittle bones).  Hepatitis C: Get tested at least once in your life.  STIs (sexually transmitted infections)  Before age 24: Ask your care team if you should be screened for STIs.  After age 24: Get screened for STIs if you're at risk. You are at risk for STIs (including HIV) if:  You are sexually active with more than one person.  You don't use condoms every time.  You or a partner was diagnosed with a sexually transmitted infection.  If you are at risk for HIV, ask about PrEP medicine to prevent HIV.  Get tested for HIV at least once in your life, whether you are at risk for HIV or not.  Cancer screening tests  Cervical cancer screening: If you have a cervix, begin getting regular cervical cancer screening tests starting at age 21.  Breast cancer scan (mammogram): If you've ever had breasts, begin having regular mammograms starting at age 40. This is a scan to check for breast cancer.  Colon cancer screening: It is important to start screening for colon cancer at age 45.  Have a colonoscopy test every 10 years (or more often if you're at risk) Or, ask your provider about stool tests like a FIT test every year or Cologuard test every 3 years.  To learn more about your testing options, visit:   https://www.[employer]K [employer]wide/615179.pdf.  For help making a decision, visit:   https://bit.ly/am96485.  Prostate cancer screening test: If you have a prostate, ask your care team if a prostate cancer screening test (PSA) at age 55 is right for you.  Lung cancer screening: If you are a current or former smoker ages 50 to 80, ask your care team if ongoing lung cancer screenings are right for you.  For informational purposes only. Not to replace the advice of your  health care provider. Copyright   2023 [facility]. All rights reserved. Clinically reviewed by the [facility] Program. [employer] [id number] - REV 01/24.

## 2024-02-02 NOTE — LETTER
My Asthma Action Plan    Name: Sunny Hayes   YOB: 1975  Date: 2/2/2024   My doctor: Kathryn Hernandez PA-C   My clinic: [facility]        My Control Medicine:  Advair 500-50 mcg/act 1 puff BID  My Rescue Medicine: Albuterol (Proair/Ventolin/Proventil HFA) 2-4 puffs EVERY 4 HOURS as needed. Use a spacer if recommended by your provider.   My Asthma Severity:   Moderate Persistent  Know your asthma triggers:                GREEN ZONE   Good Control  I feel good  No cough or wheeze  Can work, sleep and play without asthma symptoms       Take your asthma control medicine every day.     If exercise triggers your asthma, take your rescue medication  15 minutes before exercise or sports, and  During exercise if you have asthma symptoms  Spacer to use with inhaler: If you have a spacer, make sure to use it with your inhaler             YELLOW ZONE Getting Worse  I have ANY of these:  I do not feel good  Cough or wheeze  Chest feels tight  Wake up at night   Keep taking your Green Zone medications  Start taking your rescue medicine:  every 20 minutes for up to 1 hour. Then every 4 hours for 24-48 hours.  If you stay in the Yellow Zone for more than 12-24 hours, contact your doctor.  If you do not return to the Green Zone in 12-24 hours or you get worse, start taking your oral steroid medicine if prescribed by your provider.           RED ZONE Medical Alert - Get Help  I have ANY of these:  I feel awful  Medicine is not helping  Breathing getting harder  Trouble walking or talking  Nose opens wide to breathe       Take your rescue medicine NOW  If your provider has prescribed an oral steroid medicine, start taking it NOW  Call your doctor NOW  If you are still in the Red Zone after 20 minutes and you have not reached your doctor:  Take your rescue medicine again and  Call 911 or go to the emergency room right away    See your regular doctor within 2 weeks of an Emergency Room or  Urgent Care visit for follow-up treatment.          Annual Reminders:  Meet with Asthma Educator,  Flu Shot in the Fall, consider Pneumonia Vaccination for patients with asthma (aged 19 and older).    Pharmacy:    [facility] [email or web address] [id number] - [place], [place] - [address]  [id number] - [place], [place] - [address]  [facility] - [place], [place] - [address] [place] [address]    Electronically signed by Kathryn Hernandez PA-C   Date: 02/02/24                      Asthma Triggers  How To Control Things That Make Your Asthma Worse    Triggers are things that make your asthma worse.  Look at the list below to help you find your triggers and what you can do about them.  You can help prevent asthma flare-ups by staying away from your triggers.      Trigger                                                          What you can do   Cigarette Smoke  Tobacco smoke can make asthma worse. Do not allow smoking in your home, car or around you.  Be sure no one smokes at a child s day care or school.  If you smoke, ask your health care provider for ways to help you quit.  Ask family members to quit too.  Ask your health care provider for a referral to Quit Plan to help you quit smoking, or call 2-865-487-PLAN.     Colds, Flu, Bronchitis  These are common triggers of asthma. Wash your hands often.  Don t touch your eyes, nose or mouth.  Get a flu shot every year.     Dust Mites  These are tiny bugs that live in cloth or carpet. They are too small to see. Wash sheets and blankets in hot water every week.   Encase pillows and mattress in dust mite proof covers.  Avoid having carpet if you can. If you have carpet, vacuum weekly.   Use a dust mask and HEPA vacuum.   Pollen and Outdoor Mold  Some people are allergic to trees, grass, or weed pollen, or molds. Try to keep your windows closed.  Limit time out doors when pollen count  is high.   Ask you health care provider about taking medicine during allergy season.     Animal Dander  Some people are allergic to skin flakes, urine or saliva from pets with fur or feathers. Keep pets with fur or feathers out of your home.    If you can t keep the pet outdoors, then keep the pet out of your bedroom.  Keep the bedroom door closed.  Keep pets off cloth furniture and away from stuffed toys.     Mice, Rats, and Cockroaches   Some people are allergic to the waste from these pests.   Cover food and garbage.  Clean up spills and food crumbs.  Store grease in the refrigerator.   Keep food out of the bedroom.   Indoor Mold  This can be a trigger if your home has high moisture. Fix leaking faucets, pipes, or other sources of water.   Clean moldy surfaces.  Dehumidify basement if it is damp and smelly.   Smoke, Strong Odors, and Sprays  These can reduce air quality. Stay away from strong odors and sprays, such as perfume, powder, hair spray, paints, smoke incense, paint, cleaning products, candles and new carpet.   Exercise or Sports  Some people with asthma have this trigger. Be active!  Ask your doctor about taking medicine before sports or exercise to prevent symptoms.    Warm up for 5-10 minutes before and after sports or exercise.     Other Triggers of Asthma  Cold air:  Cover your nose and mouth with a scarf.  Sometimes laughing or crying can be a trigger.  Some medicines and food can trigger asthma.

## 2024-02-02 NOTE — PROGRESS NOTES
Preventive Care Visit  [facility] GEOFF Hernandez PA-C, Family Medicine  Feb 2, 2024    Assessment & Plan     Routine general medical examination at a health care facility  Reviewed personal and family history. Reviewed age appropriate screenings. Reviewed healthy BP and BMI ranges. Counseled on lifestyle modifications for optimal mental and physical health.  Discussed age-appropriate health maintenance. Recommended any needed vaccinations. Continue to focus on well balanced diet and exercise.     Essential hypertension  Controlled with olmesartan. He has follow-up scheduled with cardiology in a few months and if he remains stable, he would like to transition his BP management here.  - Basic metabolic panel  (Ca, Cl, CO2, Creat, Gluc, K, Na, BUN); Future  - Basic metabolic panel  (Ca, Cl, CO2, Creat, Gluc, K, Na, BUN)    Hypothyroidism, unspecified type  Chronic, clinically euthyroid. Will check labs today so timing of his labs aligns with his physical.  - TSH with free T4 reflex; Future  - levothyroxine (SYNTHROID/LEVOTHROID) 175 MCG tablet; Take 1 tablet (175 mcg) by mouth daily  - TSH with free T4 reflex    John's esophagus without dysplasia  Doing well. Due for repeat EGD in 2025.  - esomeprazole (NEXIUM) 40 MG DR capsule; Take 1 capsule (40 mg) by mouth every morning (before breakfast)    Screening for prostate cancer  - PSA, screen; Future  - PSA, screen    Moderate persistent asthma without complication  Chronic, well controlled. Continue.  - fluticasone-salmeterol (ADVAIR) 500-50 MCG/ACT inhaler; Inhale 1 puff into the lungs every 12 hours  - esomeprazole (NEXIUM) 40 MG DR capsule; Take 1 capsule (40 mg) by mouth every morning (before breakfast)  - OFFICE/OUTPT VISIT,EST,LEVL IV    GUCIC (obstructive sleep apnea)  Following with sleep clinic, using CPAP      BMI  Estimated body mass index is 27.12 kg/m  as calculated from the following:    Height as of this encounter: 1.829 m  (6').    Weight as of this encounter: 90.7 kg (200 lb).   Weight management plan: Discussed healthy diet and exercise guidelines    Counseling  Appropriate preventive services were discussed with this patient, including applicable screening as appropriate for fall prevention, nutrition, physical activity, Tobacco-use cessation, weight loss and cognition.  Checklist reviewing preventive services available has been given to the patient.  Reviewed patient's diet, addressing concerns and/or questions.   Patient is at risk for social isolation and has been provided with information about the benefit of social connection.       Toby Correa is a 49 year old, presenting for the following:  Physical        2/2/2024     1:17 PM   Additional Questions   Roomed by Chela CAMPOVERDE        Establish care.    Needs med refills.       Health Care Directive  Patient does not have a Health Care Directive or Living Will:     HPI    New patient to me today. 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   Recently moved to [place] from [place]. Hopes to move to [place] in the next few years.  He has a 9 year old son with autism    John's   Last EGD 1/2021 - repeat in 4 years  Lifelong PPI - esomeprazole 40 mg daily  Denies symptoms    HTN: was following with cardiology recently to get BP under control and made some medication changes. With olmesartan, BP has been well controlled.   He did have renal artery US that was negative for renal artery stenosis.    Family history of premature coronary artery disease       Coronary artery calcifications  -7/2023 stress echocardiogram negative for inducible ischemia  -8/2023 CT calcium score with total score of 2.14, recommendation for moderate intensity statin  -9/2023 initiated on rosuvastatin 5 mg daily after panel showed LDL was 109  -11/2023 repeat lipid panel showed an LDL 55    Moderate obstructive sleep apnea  -Sleep  study positive for sleep apnea, started using CPAP machine 9/2023  Sleep interrupted by nightmares, tried prazosin but caused headaches. Has follow-up with sleep clinic in a few weeks.    Asthma - moderate persistent  Feels well controlled.  Advair daily  Albuterol 1-2 times per month      1/12/2023     5:07 PM 6/26/2023     5:52 PM 2/1/2024     1:46 PM   ACT Total Scores   ACT TOTAL SCORE (Goal Greater than or Equal to 20) 21 25 22   In the past 12 months, how many times did you visit the emergency room for your asthma without being admitted to the hospital? 0 0 0   In the past 12 months, how many times were you hospitalized overnight because of your asthma? 0 0 0     Hypothyroidism  Taking levothyroxine 175 mcg daily  Thyroid feels stable        2/1/2024   General Health   How would you rate your overall physical health? Good   Feel stress (tense, anxious, or unable to sleep) Rather much   (!) STRESS CONCERN      2/1/2024   Nutrition   Three or more servings of calcium each day? Yes   Diet: Regular (no restrictions)    Low salt   How many servings of fruit and vegetables per day? 4 or more   How many sweetened beverages each day? 0-1         2/1/2024   Exercise   Days per week of moderate/strenous exercise 4 days   Average minutes spent exercising at this level 50 min         2/1/2024   Social Factors   Frequency of gathering with friends or relatives Never   Worry food won't last until get money to buy more No   Food not last or not have enough money for food? No   Do you have housing?  Yes   Are you worried about losing your housing? No   Lack of transportation? No   Unable to get utilities (heat,electricity)? No   (!) SOCIAL CONNECTIONS CONCERN      2/1/2024   Fall Risk   Fallen 2 or more times in the past year? No   Trouble with walking or balance? No          2/1/2024   Dental   Dentist two times every year? Yes          No data to display                  Today's PHQ-2 Score:       2/1/2024     1:43 PM    PHQ-2 (  [employer])   Q1: Little interest or pleasure in doing things 0   Q2: Feeling down, depressed or hopeless 0   PHQ-2 Score 0   Q1: Little interest or pleasure in doing things Not at all   Q2: Feeling down, depressed or hopeless Not at all   PHQ-2 Score 0           2024   Substance Use   Alcohol more than 3/day or more than 7/wk No   Do you use any other substances recreationally? No     Social History     Tobacco Use    Smoking status: Former     Packs/day: 1.00     Years: 10.00     Additional pack years: 0.00     Total pack years: 10.00     Types: Cigarettes, Dip, chew, snus or snuff     Start date: 1990     Quit date: 2005     Years since quittin.0    Smokeless tobacco: Former     Quit date: 2010   Vaping Use    Vaping Use: Never used   Substance Use Topics    Alcohol use: Not Currently     Alcohol/week: 1.0 - 2.0 standard drink of alcohol     Types: 1 - 2 Standard drinks or equivalent per week     Comment: 1-2 drinks per week    Drug use: Never           2024   STI Screening   New sexual partner(s) since last STI/HIV test? No   The ASCVD Risk score (Dilip LE, et al., 2019) failed to calculate for the following reasons:    The valid total cholesterol range is 130 to 320 mg/dL            2024   Contraception/Family Planning   Questions about contraception or family planning No        Reviewed and updated as needed this visit by Provider   Tobacco  Allergies  Meds  Problems  Med Hx  Surg Hx  Fam Hx            Past Medical History:   Diagnosis Date    ASCVD (arteriosclerotic cardiovascular disease) 09/15/2023    Chest pain, unspecified type     Gastroesophageal reflux disease     Heart disease     Hypertension     Hypothyroidism     Precordial pain     Uncomplicated asthma      Past Surgical History:   Procedure Laterality Date    CHOLECYSTECTOMY      COLONOSCOPY      ENT SURGERY      Tonsils    HERNIA REPAIR       Lab work is in process  Labs reviewed in  EPIC  BP Readings from Last 3 Encounters:   24 118/72   23 120/68   23 (!) 147/85    Wt Readings from Last 3 Encounters:   24 90.7 kg (200 lb)   23 93 kg (205 lb)   23 94.8 kg (209 lb)                  Patient Active Problem List   Diagnosis    Hypothyroidism    Essential hypertension    John's esophagus without dysplasia  - lifelong ppi    Moderate persistent asthma without complication    Gilbert's syndrome    Beta thalassemia - baseline Hgb 10    GUCCI (obstructive sleep apnea)    Family history of coronary artery disease occurring prior to 55 years of age    ASCVD (arteriosclerotic cardiovascular disease)     Past Surgical History:   Procedure Laterality Date    CHOLECYSTECTOMY      COLONOSCOPY      ENT SURGERY      Tonsils    HERNIA REPAIR         Social History     Tobacco Use    Smoking status: Former     Packs/day: 1.00     Years: 10.00     Additional pack years: 0.00     Total pack years: 10.00     Types: Cigarettes, Dip, chew, snus or snuff     Start date: 1990     Quit date: 2005     Years since quittin.0    Smokeless tobacco: Former     Quit date: 2010   Substance Use Topics    Alcohol use: Not Currently     Alcohol/week: 1.0 - 2.0 standard drink of alcohol     Types: 1 - 2 Standard drinks or equivalent per week     Comment: 1-2 drinks per week     Family History   Problem Relation Age of Onset    Diabetes Mother     Cerebrovascular Disease Mother     Coronary Artery Disease Father     Hypertension Father     Hyperlipidemia Father     Obesity Father     Cerebrovascular Disease Maternal Grandfather     Prostate Cancer No family hx of     Colon Cancer No family hx of     Breast Cancer No family hx of          Current Outpatient Medications   Medication Sig Dispense Refill    albuterol (PROAIR HFA/PROVENTIL HFA/VENTOLIN HFA) 108 (90 Base) MCG/ACT inhaler Inhale 2 puffs into the lungs every 6 hours as needed for shortness of breath, wheezing  or cough 18 g 3    esomeprazole (NEXIUM) 40 MG DR capsule Take 1 capsule (40 mg) by mouth every morning (before breakfast) 90 capsule 3    fluticasone-salmeterol (ADVAIR) 500-50 MCG/ACT inhaler Inhale 1 puff into the lungs every 12 hours 60 each 5    levothyroxine (SYNTHROID/LEVOTHROID) 175 MCG tablet Take 1 tablet (175 mcg) by mouth daily 90 tablet 3    olmesartan (BENICAR) 40 MG tablet Take 1 tablet (40 mg) by mouth daily 90 tablet 3    rosuvastatin (CRESTOR) 5 MG tablet Take 1 tablet (5 mg) by mouth daily 90 tablet 3     Allergies   Allergen Reactions    Ace Inhibitors Unknown     Cough, leg swelling    Bicillin C-R, Unknown    Penicillins Difficulty breathing, Hives, Itching and Swelling     rash       Review of Systems    Review of Systems  Constitutional, HEENT, cardiovascular, pulmonary, gi and gu systems are negative, except as otherwise noted.     Objective    Exam  /72 (BP Location: Right arm, Patient Position: Sitting, Cuff Size: Adult Large)   Pulse 82   Temp 98.4  F (36.9  C) (Oral)   Resp 14   Ht 1.829 m (6')   Wt 90.7 kg (200 lb)   SpO2 98%   BMI 27.12 kg/m     Estimated body mass index is 27.12 kg/m  as calculated from the following:    Height as of this encounter: 1.829 m (6').    Weight as of this encounter: 90.7 kg (200 lb).    Physical Exam  GENERAL: alert and no distress  EYES: Eyes grossly normal to inspection, PERRL and conjunctivae and sclerae normal  HENT: ear canals and TM's normal, nose and mouth without ulcers or lesions  NECK: no adenopathy, no asymmetry, masses, or scars  RESP: lungs clear to auscultation - no rales, rhonchi or wheezes  CV: regular rate and rhythm, normal S1 S2, no S3 or S4, no murmur, click or rub, no peripheral edema  ABDOMEN: soft, nontender, no hepatosplenomegaly, no masses and bowel sounds normal  MS: no gross musculoskeletal defects noted, no edema  SKIN: no suspicious lesions or rashes  NEURO: Normal strength and tone, mentation intact and speech  normal  PSYCH: mentation appears normal, affect normal/bright  LYMPH: no cervical, supraclavicular adenopathy      Signed Electronically by: Kathryn Hernandez PA-C

## 2024-02-03 LAB
ANION GAP SERPL CALCULATED.3IONS-SCNC: 10 MMOL/L (ref 7–15)
BUN SERPL-MCNC: 12.2 MG/DL (ref 6–20)
CALCIUM SERPL-MCNC: 9 MG/DL (ref 8.6–10)
CHLORIDE SERPL-SCNC: 106 MMOL/L (ref 98–107)
CREAT SERPL-MCNC: 0.89 MG/DL (ref 0.67–1.17)
DEPRECATED HCO3 PLAS-SCNC: 24 MMOL/L (ref 22–29)
EGFRCR SERPLBLD CKD-EPI 2021: >90 ML/MIN/1.73M2
GLUCOSE SERPL-MCNC: 96 MG/DL (ref 70–99)
POTASSIUM SERPL-SCNC: 4.1 MMOL/L (ref 3.4–5.3)
PSA SERPL DL<=0.01 NG/ML-MCNC: 0.39 NG/ML (ref 0–3.5)
SODIUM SERPL-SCNC: 140 MMOL/L (ref 135–145)
T4 FREE SERPL-MCNC: 1.54 NG/DL (ref 0.9–1.7)
TSH SERPL DL<=0.005 MIU/L-ACNC: 0.16 UIU/ML (ref 0.3–4.2)

## 2024-02-05 DIAGNOSIS — E03.9 HYPOTHYROIDISM, UNSPECIFIED TYPE: Primary | ICD-10-CM

## 2024-02-21 NOTE — PROGRESS NOTES
Virtual Visit Details    Type of service:  Video Visit     Originating Location (pt. Location): Home    Distant Location (provider location):  Off-site  Platform used for Video Visit: AmWell  Visit Start Time: 11:04 AM  Visit End Time: 11:45 AM      SLEEP MEDICINE CONSULTATION  Sleep Psychology  2024    Name: Sunny Hayes MRN# [id number]   Age: 49 year old YOB: 1975     Date of Consultation: 2024  Consultation is requested by: Sigrid Storm MD  [address] [address]  Primary care provider: Kathryn Hernandez    Reason for Sleep Consultation     Sunny Hayes is a 49 year old male seen today for a behavioral sleep medicine consultation because of insomnia and nightmares    Assessment and Plan     Sleep Diagnoses:            Chronic insomnia  Nightmares associated with chronic post-traumatic stress disorder  GUCCI (obstructive sleep apnea)  Shift Work Sleep Disturbance    Co-occurring Conditions:         PTSD per history       Persistent Moderate Asthma       ASCVD       Essential Hypertension           Clinical Impressions:    Sunny Hayes was seen for a sleep psychology consultation and possible behavioral sleep intervention and treatment. History and clinical presentation suggest multifactor to insomnia associated with exposure to traumatic experiences in early adulthood and report of posttraumatic stress symptoms in the past including hypervigilance, intrusive images and memory of experiences, and recurrent nightmares.  Patient reports that most of the symptoms have diminished and improved over time with the exception of chronic recurrent nightmares.  He is using CPAP nightly but when awaking from a nightmare he suddenly takes his mask off and does not return to using it resulting in suboptimal treatment for sleep apnea.    Recommendations and Counselin.  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  He was advised to continue with basic sleep hygiene strategies surrounding his shift including avoiding caffeine in the later part of his shift, taking time to wind before attempting to sleep, and ensuring that his sleep space is quiet, comfortable and dark and without disruptions.    2.  At follow-up we will introduce imagery rehearsal therapy technique focusing on recurrent nightmares.    3.  Patient will take time to do mindful breathing exercises to reduce heart rate and sympathetic nervous system arousal when awakening from nightmares and will resume use of mask after he has become calm and relaxed.    4.  Follow-up with sleep medicine for management of sleep apnea.    Sunny was provided information about the pathophysiology of insomnia, psychophysiological factors contributing to the onset and maintenance of insomnia and how co-occurring medical conditions and intrinsic sleep disorders can affect sleep.  Treatment options were discussed  as applicable to patient specific sleep concerns and symptoms. The benefits and potential early side effects of treatment including increased daytime sleepiness were discussed.     Patient was advised to consult with their prescribing provider around use of or changes to prescription sleep medication.  Patient was counseled on the importance of avoiding driving if drowsy.        Services provided are compliant with the requirements of [place] Statute SS 256B.0625 Subd. 3b and paragraph (b)     Follow-up: 4 weeks     History of Present Sleep Complaint     Sunny Hayes is a 49 year old year old male who presents with a longstanding history of insomnia and nightmares associated with  service related PTSD.  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  He reports hypervigilance, startle response to loud noises,     He has tried prazosin and other  agents for nightmares but has not found them helpful.    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    He works three on, three off 8 hour shifts that progress later by 2 hours.    He reports recent initiation of CPAP for GUCCI.  He notes he takes off his CPAP mask when he awakens from a nightmare and does not put it back on again.  He simply stays in bed waiting for his heart rate to slow and to relax.  He usually falls back asleep within 10-15 minutes without placing the mask back on his head.  Patient uses a fullface mask.    Sunny reports a longstanding history of recurrent nightmares emerging in the setting of stressful work as a paramedic and  service.  He does report recurrent episodes of exposure to violence, trauma, and acute and severe medical events and stressors.  Initially Sunny reports he had intrusive thoughts, flashbacks and markedly elevated sympathetic nervous system response to environmental triggers or cues.  Over time, the symptoms have abated though he does report having an increased startle response.  However, nightmares have persisted over the last couple of decades.    Nightmare themes involve location of being in self training as locations with a sense of being trapped or claustrophobic.  The nightmares tend to involve  or decayed individuals who come back to life to amee, attack or pursue him.        Activities in bed: Read    Prescribed or OTC stimulants: None    Prescribed or OTC sleep medication: None    Previous sleep medications patients has tried: Ambien, caused sleep walking.    SLEEP-WAKE SCHEDULE:    Work/School Days: Patient goes to school/work: Yes     Time to Bed:  Different every day    Sleep Latency: 5 minutes - 2 hours to fall asleep    Difficulty falling asleep:  3-4 nights nights per week    Number of awakenings: 4-5 times per night due to External stimuli (bed partner, pets, noise, etc), Nightmares    Trouble falling back asleep  4-5  times a week     Usually takes 30 minutes or I just give up to get back to sleep              Rise time: Different every day    Uses alarm? Yes    Weekends/Non-work Days/All Other Days    Usually gets into bed at Different every day     Sleep latency:  5 minutes - 45 minutes     Rise time: Different every day    Uses alarm? Yes    Patient sleep estimates:    Average total sleep time:  6-7 hours per night     Patient estimate of sleep need: 7-8 hours    Preferred sleep position(s): Back, Side     Naps    Intentional naps: 0 times a week    Duration:  0 in duration    Feels better after a nap: No    Unintentional Dozing:  3-4 days per week    Driving accident or near-miss due to sleepiness/drowsiness? No    SLEEP DISRUPTIONS:    Breathing/Snoring    Patient snores:Yes    Other people complain about His snoring: Yes    Patient has been told He stops breathing in His sleep: Yes    He has issues with any of the following: Morning headaches, Morning mouth dryness, Stuffy nose when you wake up    Movement:    Patient gets pain, discomfort, with an urge to move: Yes    Symptoms occur when He is resting: Yes    Symptoms occur more at night:Yes    Patient has been told He kicks His legs at night:Yes     Behaviors in Sleep:    Sunny Hayes has experienced the following behaviors while sleeping: Recurring Nightmares, Sleep-talking, Sleepwalking, Teeth grinding, Night terrors (screaming,yelling or acting afraid but not recalling event), See or hear things that are not really there upon awakening or just falling asleep    He has experienced sudden muscle weakness during the day: No    Caffeine, Alcohol and Other Substances:    Number of caffeinated beverages(per day: 2    Last caffeine use is usually: 12 pm    Uses alcohol to promote sleep: No    Drinks alcohol near bedtime: No      FAMILY HISTORY OF SLEEP DISORDERS    Patient has a family member been diagnosed with a sleep disorder: Yes  Insomnia           SCALES       EPWORTH SLEEPINESS SCALE    Likeliness of dozing or falling  asleep:    Sitting and reading: Moderate chance of dozing    Watching TV: Moderate chance of dozing    Sitting, inactive in a public place (e.g. a theatre or a meeting): Slight chance of dozing    As a passenger in a car for an hour without a break: High chance of dozing    Lying down to rest in the afternoon when circumstances permit: Moderate chance of dozing    Sitting and talking to someone: Slight chance of dozing    Sitting quietly after a lunch without alcohol: Slight chance of dozing    In a car, while stopped for a few minutes in traffic: Slight chance of dozing    Total score - [place]: 13      INSOMNIA SEVERITY INDEX (ALFONSO)      The Insomnia Severity Index measures the severity of insomnia symptoms over the past two weeks.    1. Difficulty falling asleep: Mild    2. Difficulty staying asleep: Severe    3. Problems waking up too early: Moderate    4. How SATISFIED/DISSATISFIED are youwith your CURRENT sleep pattern? Dissatisfied    5. How NOTICEABLE to others do you think your sleep problem is in terms of impairing the quality of your life? Much      6. How WORRIED/DISTRESSED are you about your sleep problem? Somewhat    7. To what extent do you consider your sleep problem to INTERFERE with your daily functioning (e.g. daytime fatigue, mood, ability to [place]on at work/daily chores, concentration, memory, mood, etc.) CURRENTLY?   Much     ALFONSO Total Score: 17    Guidelines for Scoring/Interpretation:   0-7 = No clinically significant insomnia   8-14 = Subthreshold insomnia   15-21 = Clinical insomnia (moderate severity)  22-28 = Clinical insomnia (severe)      STOP BANG     1. Snoring: Do you snore loudly (louder than talking or loud enough to be heard through closed doors)?  Yes    2. Tired: Do you often feel tired, fatigued, or sleepy during daytime?  Yes    3. Observed: Has anyone observed you stop breathing during your sleep?  Yes    4. Blood  pressure: Do you have or are you being treated for high blood pressure?  Yes    5. BMI: BMI more than 35 kg/m2?  No    6. Age: Age over 50 yr old?  Yes    7. Neck circumference: Neck circumference greater than 40 cm?  No    8. Gender: Gender male?  Yes    STOP-BANG Total Score: 6    GUCCI Risk  0-2 Low risk for GUCCI  3-4  Intermediate risk for GUCCI  5-8 High risk    PHQ-9     Over the last 2 weeks, how often have you been bothered by any of the following problems?    1. Little interest or pleasure in doing things -      2. Feeling down, depressed, or hopeless -      3. Trouble falling or staying asleep, or sleeping too much -     4. Feeling tired or having little energy -      5. Poor appetite or overeating -      6. Feeling bad about yourself - or that you are a failure or have let yourself or your family down -      7. Trouble concentrating on things, such as reading the newspaper or watching television -     8. Moving or speaking so slowly that other people could have noticed? Or the opposite - being so fidgety or restless that you have been moving around a lot more than usual     9. Thoughts that you would be better off dead or of hurting  yourself in some way     Total Score:       If you checked off any problems, how difficult have these problems made it for you to do your work, take care of things at home, or get along with other people?      Developed by Ej Meadows, Patricia Hoyt, Anuj Silva and colleagues, with an educational ziggy from [employer] No permission required to reproduce, translate, display or distribute. permission required to reproduce, translate, display or distribute.     HOLLIE-7        8/11/2022     4:41 PM 5/12/2023     8:31 AM   HOLLIE-7 SCORE   Total Score 7 (mild anxiety) 2 (minimal anxiety)   Total Score 7 2         Previous Sleep Consultations/Studies       KASANDRA Aragon, SLeep Medicine Consultation, 12/21/23?  Impression/Plan:   (G47.33) GUCCI (obstructive sleep apnea)  "(F51.5, F43.12) Nightmares associated with chronic post-traumatic stress disorder  Comment: Sunny presents for follow-up of his moderate sleep apnea, managed with CPAP. He received his machine on 09/27/2023 but has no compliance requirements. He can fall asleep with CPAP on and he is able to sleep for a couple hours. He then takes it off in the night after he wakes up from a nightmare. He has nightmares every night, and they are related to past trauma from the . He is scheduled to see Dr. Abraham Dennison in February, and he is going to make a mental health appointment as well. Sunny has gasp arousals when awakening from a nightmare. He has tried a few different mask styles to find one that doesn't leak or make him feel claustrophobic. His download shows his apnea is adequately treated with a residual AHI of 3.3 events/hr. His leak is acceptable.    Plan: prazosin (MINIPRESS) 1 MG capsule, [facility]  His pressure settings were changed from 5-15 cmH2O to CPAP 8 cmH2O based on his 95th percentile pressure to help with comfort. A prescription was written for new supplies. We reviewed recommendations for cleaning and replacing supplies. Sunny is interested in starting prazosin for treatment of his nightmares associated with PTSD. We will start at 1 mg at bedtime. 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 He was also provided information regarding dream rehearsal therapy.       Study Date: 9/10/2023 MRN: [id number] Referring Provider: - Ordering Provider: Dr. Barnes     Indications for Polysomnography: The patient is a 48-year-old Male who is 5' 10\" and weighs 210.0 lbs. His BMI is 29.7, [place] sleepiness scale 18 and neck circumference is 36 cm. Patient reports snoring, observed apneas during sleep, non-restorative sleep, fatigue and excessive daytime sleepiness, in the setting of HTN, hypothyroidism, gastroesophageal reflux disease, Gilbert's syndrome, John's esophagus without " dysplasia. A diagnostic polysomnogram was performed to evaluate for sleep apnea/hypoxemia. Polysomnogram Data: A full night polysomnogram recorded the standard physiologic parameters including EEG, EOG, EMG, ECG, nasal and oral airflow. Respiratory parameters of chest and abdominal movements were recorded with respiratory inductance plethysmography. Oxygen saturation was recorded by pulse oximetry. Hypopnea scoring rule used: 1B 4%. Sleep Architecture: Sleep architecture was remarkable for sleep fragmentation and increased wake after sleep onset with reduced sleep efficiency. All sleep stages were seen. The total recording time of the polysomnogram was 464.9 minutes. The total sleep time was 365.5 minutes. Sleep latency was decreased at 9.1 minutes with the use of a sleep aid (3 mg melatonin at 2050 and another 3 mg at 0105). REM latency was 98.0 minutes. Arousal index was increased at 48.6 arousals per hour (due to spontaneous arousals and respiratory events). Sleep efficiency was decreased at 78.6%. Wake after sleep onset was 90.0 minutes. The patient spent 21.6% of total sleep time in Stage N1, 43.2% in Stage N2, 17.9% in Stage N3, and 17.2% in REM. Time in REM supine was 32.5 minutes. Respiration: Moderate obstructive sleep apnea was present (both obstructive and central apneas were seen, and obstructive>central apneas) with sleep associated hypoxemia. The disordered breathing events were pronounced during supine sleep and during REM sleep, particularly pronounced during REM sleep in supine position. Events ? The polysomnogram revealed a presence of 40 obstructive, 32 central, and 18 mixed apneas resulting in an apnea index of 14.8 events per hour. There were 42 obstructive hypopneas and 16 central hypopneas resulting in an obstructive hypopnea index of 6.9 and central hypopnea index of 2.6 events per hour. The combined apnea/hypopnea index was 24.3 events per hour (central apnea/hypopnea index was 7.9 events  per hour). The REM AHI was 41.9 events per hour. The supine AHI was 35.4 events per hour. The RERA index was 2.1 events per hour. The RDI was 26.4 events per hour.  Snoring - was reported as mild to moderate.  Respiratory rate and pattern - was notable for normal respiratory rate and intermittent periodicity in breathing pattern.  Sustained Sleep Associated Hypoventilation - Transcutaneous carbon dioxide monitoring was not used.  Sleep Associated Hypoxemia - (Greater than 5 minutes O2 sat at or below 88%) was present. Baseline oxygen saturation was 93.8%. Lowest oxygen saturation was 82.0%. Time spent less than or equal to 88% was 5.7 minutes. Time spent less than or equal to 89% was 11.1 minutes. Movement Activity: There were no significant limb movements during the study. Abnormal sleep related behaviors were not noted.     Periodic Limb Activity - There were 21 PLMs during the entire study. The PLM index was 3.4 movements per hour. The PLM Arousal Index was 0.5 per hour.  REM EMG Activity - Excessive transient/sustained muscle activity was not present.  Nocturnal Behavior - Abnormal sleep related behaviors were not noted during/arising out of NREM / REM sleep.  Bruxism - Not apparent.     Cardiac Summary: Normal sinus rhythm was noted with occasional sinus tachycardia. The average pulse rate was 63.1 bpm. The minimum pulse rate was 45.0 bpm while the maximum pulse rate was 99.0 bpm. Arrhythmias were not noted.     Assessment:  Sleep architecture was remarkable for sleep fragmentation and increased wake after sleep onset with reduced sleep efficiency. All sleep stages were seen.  Moderate obstructive sleep apnea was present (both obstructive and central apneas were seen, and obstructive>central apneas) with sleep associated hypoxemia. The disordered breathing events were pronounced during supine sleep and during REM sleep, particularly pronounced during REM sleep in supine position.  There were no significant limb  movements during the study.  Abnormal sleep related behaviors were not noted during the study.  Normal sinus rhythm was noted with occasional sinus tachycardia.     Recommendations:  Recommend repeat polysomnography with full night titration of positive airway pressure therapy for the control of sleep disordered breathing.  Based on the presence of moderate obstructive sleep apnea and excessive daytime sleepiness, treatment could be empirically initiated with auto titrating PAP therapy with a range of 5 to 15 cmH2O. Recommend clinical follow up with sleep management team.  Alternative option includes combination of positional therapy during sleep along with dental appliance through referral to Sleep Dentistry for the treatment of obstructive sleep apnea and/or socially disruptive snoring.  If devices are not acceptable or effective, patient may benefit from evaluation of possible surgical options. If he is interested, would recommend referral to specialized ENT-Sleep provider.  Advice regarding the risks of drowsy driving.  Suggest optimizing sleep schedule and avoiding sleep deprivation.  Weight management (if BMI > 30).  Pharmacologic therapy should be used for management of restless legs syndrome only if present and clinically indicated and not based on the presence of periodic limb movements alone. Diagnostic Codes: Obstructive Sleep Apnea G47.33 Sleep Hypoxemia/Hypoventilation G47.36 Repetitive Intrusions Into Sleep F51.8 9/10/2023     [place] [facility] Sleep Study (210.0 lbs) - AHI 24.3, RDI 26.4, Supine AHI 35.4, REM AHI 41.9, Low O2 82.0%, Time Spent ?88% 5.7 minutes / Time Spent ?89% 11.1 minutes  Vitals     There were no vitals taken for this visit.     Medical History     Allergies:    Allergies   Allergen Reactions    Ace Inhibitors Unknown     Cough, leg swelling    Bicillin C-R, Unknown    Penicillins Difficulty breathing, Hives, Itching and Swelling     rash         Medications:    Current  Outpatient Medications   Medication Sig Dispense Refill    albuterol (PROAIR HFA/PROVENTIL HFA/VENTOLIN HFA) 108 (90 Base) MCG/ACT inhaler Inhale 2 puffs into the lungs every 6 hours as needed for shortness of breath, wheezing or cough 18 g 3    esomeprazole (NEXIUM) 40 MG DR capsule Take 1 capsule (40 mg) by mouth every morning (before breakfast) 90 capsule 3    fluticasone-salmeterol (ADVAIR) 500-50 MCG/ACT inhaler Inhale 1 puff into the lungs every 12 hours 60 each 5    levothyroxine (SYNTHROID/LEVOTHROID) 175 MCG tablet Take 1 tablet (175 mcg) by mouth daily 90 tablet 3    olmesartan (BENICAR) 40 MG tablet Take 1 tablet (40 mg) by mouth daily 90 tablet 3    rosuvastatin (CRESTOR) 5 MG tablet Take 1 tablet (5 mg) by mouth daily 90 tablet 3       Problem List:  Patient Active Problem List    Diagnosis Date Noted    ASCVD (arteriosclerotic cardiovascular disease) 09/15/2023     Priority: Medium    GUCCI (obstructive sleep apnea) 07/17/2023     Priority: Medium    Family history of coronary artery disease occurring prior to 55 years of age 07/17/2023     Priority: Medium    Gilbert's syndrome 01/20/2022     Priority: Medium    John's esophagus without dysplasia  - lifelong ppi 02/25/2021     Priority: Medium     EGD 2021 - told to repeat in 4 years      Essential hypertension 01/02/2019     Priority: Medium    Hypothyroidism 06/07/2003     Priority: Medium     Formatting of this note might be different from the original.  Hypothyroidism Acquired      Moderate persistent asthma without complication 06/07/2003     Priority: Medium     Formatting of this note might be different from the original.  Asthma  NOS      Beta thalassemia - baseline Hgb 10 1975     Priority: Medium        Past Medical/Surgical History:  Past Medical History:   Diagnosis Date    ASCVD (arteriosclerotic cardiovascular disease) 09/15/2023    Chest pain, unspecified type     Gastroesophageal reflux disease     Heart disease     Hypertension      Hypothyroidism     Precordial pain     Uncomplicated asthma      Patient Active Problem List    Diagnosis Date Noted    ASCVD (arteriosclerotic cardiovascular disease) 09/15/2023     Priority: Medium    GUCCI (obstructive sleep apnea) 07/17/2023     Priority: Medium    Family history of coronary artery disease occurring prior to 55 years of age 07/17/2023     Priority: Medium    Gilbert's syndrome 01/20/2022     Priority: Medium    John's esophagus without dysplasia  - lifelong ppi 02/25/2021     Priority: Medium     EGD 2021 - told to repeat in 4 years      Essential hypertension 01/02/2019     Priority: Medium    Hypothyroidism 06/07/2003     Priority: Medium     Formatting of this note might be different from the original.  Hypothyroidism Acquired      Moderate persistent asthma without complication 06/07/2003     Priority: Medium     Formatting of this note might be different from the original.  Asthma  NOS      Beta thalassemia - baseline Hgb 10 1975     Priority: Medium     Patient Active Problem List   Diagnosis    Hypothyroidism    Essential hypertension    John's esophagus without dysplasia  - lifelong ppi    Moderate persistent asthma without complication    Gilbert's syndrome    Beta thalassemia - baseline Hgb 10    GUCCI (obstructive sleep apnea)    Family history of coronary artery disease occurring prior to 55 years of age    ASCVD (arteriosclerotic cardiovascular disease)        Most Recent Labs:  Office Visit on 02/02/2024   Component Date Value Ref Range Status    TSH 02/02/2024 0.16 (L)  0.30 - 4.20 uIU/mL Final    Sodium 02/02/2024 140  135 - 145 mmol/L Final    Reference intervals for this test were updated on 09/26/2023 to more accurately reflect our healthy population. There may be differences in the flagging of prior results with similar values performed with this method. Interpretation of those prior results can be made in the context of the updated reference intervals.      Potassium 2024 4.1  3.4 - 5.3 mmol/L Final    Chloride 2024 106  98 - 107 mmol/L Final    Carbon Dioxide (CO2) 2024 24  22 - 29 mmol/L Final    Anion Gap 2024 10  7 - 15 mmol/L Final    Urea Nitrogen 2024 12.2  6.0 - 20.0 mg/dL Final    Creatinine 2024 0.89  0.67 - 1.17 mg/dL Final    GFR Estimate 2024 >90  >60 mL/min/1.73m2 Final    Calcium 2024 9.0  8.6 - 10.0 mg/dL Final    Glucose 2024 96  70 - 99 mg/dL Final    Prostate Specific Antigen Screen 2024 0.39  0.00 - 3.50 ng/mL Final    Free T4 2024 1.54  0.90 - 1.70 ng/dL Final     Mental Health History     Prior Mental Health Diagnosis:   PTSD, by history of symptom presentation    Mental Health Treatment:   None reported    Chemical Abuse/Treatment:    None reported        Family History     Family History   Problem Relation Age of Onset    Diabetes Mother     Cerebrovascular Disease Mother     Coronary Artery Disease Father     Hypertension Father     Hyperlipidemia Father     Obesity Father     Cerebrovascular Disease Maternal Grandfather     Prostate Cancer No family hx of     Colon Cancer No family hx of     Breast Cancer No family hx of        Social History     Social History     Socioeconomic History    Marital status:    Tobacco Use    Smoking status: Former     Packs/day: 1.00     Years: 10.00     Additional pack years: 0.00     Total pack years: 10.00     Types: Cigarettes, Dip, chew, snus or snuff     Start date: 1990     Quit date: 2005     Years since quittin.1    Smokeless tobacco: Former     Quit date: 2010   Vaping Use    Vaping Use: Never used   Substance and Sexual Activity    Alcohol use: Not Currently     Alcohol/week: 1.0 - 2.0 standard drink of alcohol     Types: 1 - 2 Standard drinks or equivalent per week     Comment: 1-2 drinks per week    Drug use: Never    Sexual activity: Yes     Partners: Female     Birth control/protection: Pill   Other Topics  Concern    Parent/sibling w/ CABG, MI or angioplasty before 65F 55M? No     Social Determinants of Health     Financial Resource Strain: Low Risk  (2/1/2024)    Financial Resource Strain     Within the past 12 months, have you or your family members you live with been unable to get utilities (heat, electricity) when it was really needed?: No   Food Insecurity: Low Risk  (2/1/2024)    Food Insecurity     Within the past 12 months, did you worry that your food would run out before you got money to buy more?: No     Within the past 12 months, did the food you bought just not last and you didn t have money to get more?: No   Transportation Needs: Low Risk  (2/1/2024)    Transportation Needs     Within the past 12 months, has lack of transportation kept you from medical appointments, getting your medicines, non-medical meetings or appointments, work, or from getting things that you need?: No   Physical Activity: Sufficiently Active (2/1/2024)    Exercise Vital Sign     Days of Exercise per Week: 4 days     Minutes of Exercise per Session: 50 min   Stress: Stress Concern Present (2/1/2024)    [employer] - Occupational Stress Questionnaire     Feeling of Stress : Rather much   Social Connections: Unknown (2/1/2024)    Social Connection and Isolation Panel [NHANES]     Frequency of Social Gatherings with Friends and Family: Never   Interpersonal Safety: Low Risk  (2/2/2024)    Interpersonal Safety     Do you feel physically and emotionally safe where you currently live?: Yes     Within the past 12 months, have you been hit, slapped, kicked or otherwise physically hurt by someone?: No     Within the past 12 months, have you been humiliated or emotionally abused in other ways by your partner or ex-partner?: No   Housing Stability: Low Risk  (2/1/2024)    Housing Stability     Do you have housing? : Yes     Are you worried about losing your housing?: No            Mental Status Examination      Sunny presented as oriented X3 with speech and language intact.  The patient was cooperative throughout the evaluation with no signs of hallucinations, delusions, loosening of associations or other thought disturbance.  Mood was normal Affect was congruent with mood. Insight and judgement were intact.  Memory appeared intact for recent and remote elements.  There was no report of suicidal ideation, intention or plan. Attention and concentration were within normal.        Eamon Dennison, PsTejal, LP, DBSM  Diplomate, Behavioral Sleep Medicine  [facility]      Copy:   Kathryn Hernandez MD  [address] [facility] [address]  [place] [address]    Note: This dictation was created using voice recognition software. This document may contain an error not identified before finalizing the document. If the error changes the accuracy of the document, I would appreciate it being brought to my attention.

## 2024-02-22 ENCOUNTER — VIRTUAL VISIT (OUTPATIENT)
Dept: SLEEP MEDICINE | Facility: CLINIC | Age: 49
End: 2024-02-22
Attending: INTERNAL MEDICINE
Payer: OTHER GOVERNMENT

## 2024-02-22 VITALS — BODY MASS INDEX: 27.09 KG/M2 | HEIGHT: 72 IN | WEIGHT: 200 LBS

## 2024-02-22 DIAGNOSIS — G47.33 OSA (OBSTRUCTIVE SLEEP APNEA): ICD-10-CM

## 2024-02-22 DIAGNOSIS — F51.04 CHRONIC INSOMNIA: Primary | ICD-10-CM

## 2024-02-22 DIAGNOSIS — F51.5 NIGHTMARES: ICD-10-CM

## 2024-02-22 DIAGNOSIS — F51.5 NIGHTMARES ASSOCIATED WITH CHRONIC POST-TRAUMATIC STRESS DISORDER: ICD-10-CM

## 2024-02-22 DIAGNOSIS — F43.12 NIGHTMARES ASSOCIATED WITH CHRONIC POST-TRAUMATIC STRESS DISORDER: ICD-10-CM

## 2024-02-22 PROCEDURE — 90791 PSYCH DIAGNOSTIC EVALUATION: CPT | Mod: 95 | Performed by: PSYCHOLOGIST

## 2024-02-22 ASSESSMENT — SLEEP AND FATIGUE QUESTIONNAIRES
HOW LIKELY ARE YOU TO NOD OFF OR FALL ASLEEP WHILE WATCHING TV: MODERATE CHANCE OF DOZING
HOW LIKELY ARE YOU TO NOD OFF OR FALL ASLEEP WHILE SITTING QUIETLY AFTER LUNCH WITHOUT ALCOHOL: SLIGHT CHANCE OF DOZING
HOW LIKELY ARE YOU TO NOD OFF OR FALL ASLEEP WHILE SITTING AND READING: MODERATE CHANCE OF DOZING
HOW LIKELY ARE YOU TO NOD OFF OR FALL ASLEEP WHILE LYING DOWN TO REST IN THE AFTERNOON WHEN CIRCUMSTANCES PERMIT: MODERATE CHANCE OF DOZING
HOW LIKELY ARE YOU TO NOD OFF OR FALL ASLEEP WHILE SITTING INACTIVE IN A PUBLIC PLACE: SLIGHT CHANCE OF DOZING
HOW LIKELY ARE YOU TO NOD OFF OR FALL ASLEEP WHILE SITTING AND TALKING TO SOMEONE: SLIGHT CHANCE OF DOZING
HOW LIKELY ARE YOU TO NOD OFF OR FALL ASLEEP WHEN YOU ARE A PASSENGER IN A CAR FOR AN HOUR WITHOUT A BREAK: HIGH CHANCE OF DOZING
HOW LIKELY ARE YOU TO NOD OFF OR FALL ASLEEP IN A CAR, WHILE STOPPED FOR A FEW MINUTES IN TRAFFIC: SLIGHT CHANCE OF DOZING

## 2024-02-22 ASSESSMENT — PAIN SCALES - GENERAL: PAINLEVEL: NO PAIN (0)

## 2024-02-22 NOTE — NURSING NOTE
Is the patient currently in the state of [place]? YES    Visit mode:VIDEO    If the visit is dropped, the patient can be reconnected by: VIDEO VISIT: Send to e-mail at: [email or web address]    Will anyone else be joining the visit? NO  (If patient encounters technical issues they should call [phone]    How would you like to obtain your AVS? MyChart    Are changes needed to the allergy or medication list? Pt stated no med changes    Reason for visit: Consult    Maggie MONTILLA  Has patient had flu shot for current/most recent flu season? If so, when? Yes: 10.11.23

## 2024-02-23 NOTE — PATIENT INSTRUCTIONS
CBT-I Module - Relaxation Training    Relaxation and Sleep    Winding Down Time    A wind-down time before you go to bed can help you relax your mind and body. Ways to help transition from a busy day to bedtime include things like:    Listening to calm music  Reading  Watching a pleasant or relaxing TV show  Taking a bath    We recommend you set a reminder to begin your wind down time one hour before bedtime.  Many mobile sleep apps such as Insomnia  have wind-down time reminders.    Relaxation and Sleep    Research shows relaxing before bed can reduce the time it takes to fall asleep and improve sleep quality.  Relaxation training works by reducing physical, emotional and mental arousal that can interfere with your sleep.     Relaxation skills are easy to learn on your own using widely available free mobile apps or online resources.  We recommend doing some form of relaxation exercise daily for at least 10 minutes.  This can include short breathing exercises used throughout the day to help manage stress.    If you are using the Insomnia  mobile musa you will find  relaxation exercises by pressing the Tools icon  going to the Relax Your Body or Quiet Your Mind section.     Insight Timer, Calm, and Headspace are examples of well-reviewed mobile apps that offer free, for-purchase, and subscription guided relaxation exercises and meditations.    Do not use guided relaxation tapes while driving or operating equipment.      Managing Worry before Bed and During the Night    All human beings worry.  Uncontrolled worry can affect your sleep and health by activating your arousal system.  Worry makes your brain, body and heart behave like there is a danger or threat.  This stress response can make it more difficult to fall asleep and stay asleep. The most common types of worry include:    Concerned about unfinished tasks  Concern over family, finances or work  Worry about things beyond your  control    Scheduling Constructive Worry Time    The part of our brain that plans, sorts, and helps manage our emotions is less effective in doing its job as nighttime comes.  Without the usual distractions of the day, we tend to worry more and have trouble putting aside our worries.    A simple technique called Constructive Worry Time can help to reduce and manage worry as you approach bedtime or in the middle of the night. It is most effective when practiced daily.        Imagery Rehearsal Therapy (IRT) for persistent nightmares    Note:  .    If you are working with a psychologist or therapist to address trauma, discuss this strategy with your therapist before utilizing.    Guidelines:    Many people choose to start with the most frequent recurring nightmare or theme, but you choose which nightmare to start with.    Use IRT for only one nightmare or theme at a time.    Practice the imagery rehearsal for minimum of 10 minutes in the evening or before bedtime each night for four weeks. Some people notice improvement in nightmare frequency or emotional intensity within a few weeks, but for some it takes longer. If there is no improvement after four weeks of nightly practice, try a new rescription.    General instructions for developing a rescription:    Picture the first part of the recurring nightmare or theme with only neutral content, then rescript the ending.  Rescription should be consistent with your values.  Rescription should include no violence.  Rescription should include minimal movement on your part. (Some people report that the new imagery shows up in their dreams, so it is best if you are not moving much in the rescription, to ensure more calm sleep.) The goal of IRT is to reduce or stop the nightmares or decrease the emotional distress level so they are easier to cope with.  The goal is not to change the content of the actual dreams, but that sometimes does happen.  The new ending should be vivid in  detail, including what you see, hear, feel, etc.  The more detailed, the better IRT seems to work.  The rescription should be somewhat creative or implausible.  If it is too firmly rooted in reality, it may not work as well.    PLEASE know that if the nightmares are related to a real past traumatic memory, IRT in NO WAY invalidates the memory.  IRT is targeting a symptom of nightmares to help improve sleep.  It does not target the memories themselves.   Memories are stored in a part of the brain different from the sleep centers. IRT targets the neuronal pathways of nightmares that have become stuck in the sleep center of the brain      Updated July 2018 Merline Lopez, PhD, ABPP, [facility]

## 2024-03-25 ENCOUNTER — LAB (OUTPATIENT)
Dept: LAB | Facility: CLINIC | Age: 49
End: 2024-03-25
Payer: OTHER GOVERNMENT

## 2024-03-25 DIAGNOSIS — E03.9 HYPOTHYROIDISM, UNSPECIFIED TYPE: ICD-10-CM

## 2024-03-25 LAB
T4 FREE SERPL-MCNC: 1.61 NG/DL (ref 0.9–1.7)
TSH SERPL DL<=0.005 MIU/L-ACNC: 0.24 UIU/ML (ref 0.3–4.2)

## 2024-03-25 PROCEDURE — 84443 ASSAY THYROID STIM HORMONE: CPT

## 2024-03-25 PROCEDURE — 36415 COLL VENOUS BLD VENIPUNCTURE: CPT

## 2024-03-25 PROCEDURE — 84439 ASSAY OF FREE THYROXINE: CPT

## 2024-03-26 DIAGNOSIS — E03.9 HYPOTHYROIDISM, UNSPECIFIED TYPE: Primary | ICD-10-CM

## 2024-03-26 RX ORDER — LEVOTHYROXINE SODIUM 150 UG/1
150 TABLET ORAL DAILY
Qty: 90 TABLET | Refills: 0 | Status: SHIPPED | OUTPATIENT
Start: 2024-03-26 | End: 2024-05-17

## 2024-05-01 ASSESSMENT — SLEEP AND FATIGUE QUESTIONNAIRES
HOW LIKELY ARE YOU TO NOD OFF OR FALL ASLEEP WHILE SITTING INACTIVE IN A PUBLIC PLACE: WOULD NEVER DOZE
HOW LIKELY ARE YOU TO NOD OFF OR FALL ASLEEP WHILE SITTING AND TALKING TO SOMEONE: WOULD NEVER DOZE
HOW LIKELY ARE YOU TO NOD OFF OR FALL ASLEEP WHILE WATCHING TV: SLIGHT CHANCE OF DOZING
HOW LIKELY ARE YOU TO NOD OFF OR FALL ASLEEP IN A CAR, WHILE STOPPED FOR A FEW MINUTES IN TRAFFIC: WOULD NEVER DOZE
HOW LIKELY ARE YOU TO NOD OFF OR FALL ASLEEP WHEN YOU ARE A PASSENGER IN A CAR FOR AN HOUR WITHOUT A BREAK: MODERATE CHANCE OF DOZING
HOW LIKELY ARE YOU TO NOD OFF OR FALL ASLEEP WHILE SITTING QUIETLY AFTER LUNCH WITHOUT ALCOHOL: SLIGHT CHANCE OF DOZING
HOW LIKELY ARE YOU TO NOD OFF OR FALL ASLEEP WHILE LYING DOWN TO REST IN THE AFTERNOON WHEN CIRCUMSTANCES PERMIT: HIGH CHANCE OF DOZING
HOW LIKELY ARE YOU TO NOD OFF OR FALL ASLEEP WHILE SITTING AND READING: SLIGHT CHANCE OF DOZING

## 2024-05-02 ENCOUNTER — VIRTUAL VISIT (OUTPATIENT)
Dept: SLEEP MEDICINE | Facility: CLINIC | Age: 49
End: 2024-05-02
Payer: OTHER GOVERNMENT

## 2024-05-02 VITALS
HEIGHT: 72 IN | DIASTOLIC BLOOD PRESSURE: 69 MMHG | BODY MASS INDEX: 27.09 KG/M2 | WEIGHT: 200 LBS | SYSTOLIC BLOOD PRESSURE: 120 MMHG

## 2024-05-02 DIAGNOSIS — F51.5 NIGHTMARES ASSOCIATED WITH CHRONIC POST-TRAUMATIC STRESS DISORDER: ICD-10-CM

## 2024-05-02 DIAGNOSIS — F43.12 NIGHTMARES ASSOCIATED WITH CHRONIC POST-TRAUMATIC STRESS DISORDER: ICD-10-CM

## 2024-05-02 DIAGNOSIS — G47.33 OSA (OBSTRUCTIVE SLEEP APNEA): ICD-10-CM

## 2024-05-02 DIAGNOSIS — F51.04 CHRONIC INSOMNIA: Primary | ICD-10-CM

## 2024-05-02 PROCEDURE — 90834 PSYTX W PT 45 MINUTES: CPT | Mod: 95 | Performed by: PSYCHOLOGIST

## 2024-05-02 ASSESSMENT — PAIN SCALES - GENERAL: PAINLEVEL: NO PAIN (0)

## 2024-05-02 NOTE — PROGRESS NOTES
Virtual Visit Details    Type of service:  Video Visit     Originating Location (pt. Location): Home    Distant Location (provider location):  Off-site  Platform used for Video Visit: AmWell    Visit Start Time: 3:36 PM  Visit End Time: 4:25 PM        SLEEP MEDICINE VIRTUAL FOLLOW-UP VISIT  Behavioral Sleep Medicine    Patient Name: Sunny Hayes  MRN:  [id number]  Date of Service: May 2, 2024       Subjective Report     Sunny Hayes  returns for a telehealth video visit to discuss progress in implementing behavioral strategies for the management of insomnia.  Patient consent for initiation of video visit was obtained and documented prior to initiation of visit.     Sunny reports he continues to work different shifts and has been taking more early shifts starting at 4 AM.  He will then shift to evening shifts in the new year which is much more accommodating to his typical sleep schedule and needs.    .  Discussed and reviewed importance of relaxation before bed.  Discussed strategies, particularly on evenings when his son is having difficulty settling for the night.    Introduced imagery rehearsal therapy in detail.  Patient identifies themes of nightmares related to danger for his son and his dying.  Second major recurrent theme is horrible things happening at work that resulted in death of patient's which is his fault and in which those who have  come back to sleep revenge or to harm him.  Reviewed in detail the application of formulating a positive dreams/daydreaming that with a focus on imagery detail, and a focus on positive outcome and positive emotional valence of the created training.  .     Sleep Data:     Source of Sleep Estimates:  Verbal Self-report    Average Time in Bed: Variable hrs.  Due to changing shifts average Total Sleep Time: Varies and ranges between 4-7 hrs  Sleep Efficiency estimate: Variable %    EPWORTH SLEEPINESS SCALE    Sitting and reading 1   Watching TV 1   Sitting, inactive  in a public place (theatre or mtg.) 0    As a passenger in a car 2   Lying down to rest in the afternoon when circumstance permit 3   Sitting and talking to someone 0   Sitting quietly after lunch without alcohol 1   In a car, while stopped for a few minutes in traffic 0   TOTAL SCORE (nl <11) 8     INSOMNIA SEVERITY INDEX     Difficulty falling asleep 2   Difficult staying asleep 3   Problems waking up to early 2   How SATISFIED/DISSATISFIED are you with your CURRENT sleep pattern? 3   How NOTICEABLE to others do you think your sleep pattern is in terms of your quality of life? 2   How WORRIED/DISTRESSED are you about your current sleep pattern? 2   To what extent do you consider your sleep problem to INTERFERE with your daily fuctioning(e.g. daytime fatigue, mood, ability to function at work/daily chores, concentration, mood,etc.) CURRENTLY? 2   INSOMNIA SEVERITY INDEX TOTAL SCORE 16    Absence of insomnia (0-7); sub-threshold insomnia (8-14); moderate insomnia (15-21); and severe insomnia (22-28)       Interventions     Strategies and recommendations including  revisiting strategies to manage shift work, relaxation and sleep, and introduction of imagery rehearsal therapy technique for practice  were reviewed and discussed today.     Services provided are compliant with the requirements of [place] Statute SS 256B.0625 Subd. 3b and paragraph (b)       Vital Signs     /69   Ht 1.829 m (6')   Wt 90.7 kg (200 lb)   BMI 27.12 kg/m       Mental Status     Orientation:  X3  Mood:  normal  Affect:  Congruent with mood  Speech/Language:  Normal  Thought Process: Intact  Associations:  Normal  Thought Content: Normal  Patient does not report any suicidal ideation, intention or plan.    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  This appears to also be affecting REM sleep and frequency of nightmares.  Introduced imagery rehearsal therapy for application of one of the 2 major nightmare themes involving son and work scenarios.    Plan:  Follow-up: 4 weeks      Eamon Dennison PsyD, MONIQUE, DBSM  Diplomate, Behavioral Sleep Medicine  [facility]      Note: This dictation was created using voice recognition software. This document may contain an error not identified before finalizing the document. If the error changes the accuracy of the document, I would appreciate it being brought to my attention.                             .

## 2024-05-02 NOTE — NURSING NOTE
Has patient had flu shot for current/most recent flu season? If so, when? Yes: 10/11/23      Is the patient currently in the state of [place]? YES    Visit mode:VIDEO    If the visit is dropped, the patient can be reconnected by: VIDEO VISIT: Text to cell phone:   Telephone Information:   Mobile [phone]       Will anyone else be joining the visit? NO  (If patient encounters technical issues they should call [phone]    How would you like to obtain your AVS? MyChart    Are changes needed to the allergy or medication list? No    Are refills needed on medications prescribed by this physician? NO    Reason for visit: RECHECK    Estefania MONTILLA

## 2024-05-17 ENCOUNTER — LAB (OUTPATIENT)
Dept: LAB | Facility: CLINIC | Age: 49
End: 2024-05-17
Payer: OTHER GOVERNMENT

## 2024-05-17 DIAGNOSIS — E03.9 HYPOTHYROIDISM, UNSPECIFIED TYPE: ICD-10-CM

## 2024-05-17 LAB — TSH SERPL DL<=0.005 MIU/L-ACNC: 1.08 UIU/ML (ref 0.3–4.2)

## 2024-05-17 PROCEDURE — 36415 COLL VENOUS BLD VENIPUNCTURE: CPT

## 2024-05-17 PROCEDURE — 84443 ASSAY THYROID STIM HORMONE: CPT

## 2024-05-17 RX ORDER — LEVOTHYROXINE SODIUM 150 UG/1
150 TABLET ORAL DAILY
Qty: 90 TABLET | Refills: 3 | Status: SHIPPED | OUTPATIENT
Start: 2024-05-17

## 2024-06-18 DIAGNOSIS — I10 ESSENTIAL HYPERTENSION: ICD-10-CM

## 2024-06-18 RX ORDER — OLMESARTAN MEDOXOMIL 40 MG/1
40 TABLET ORAL DAILY
Qty: 90 TABLET | Refills: 1 | Status: SHIPPED | OUTPATIENT
Start: 2024-06-18

## 2024-08-12 DIAGNOSIS — J45.40 MODERATE PERSISTENT ASTHMA WITHOUT COMPLICATION: ICD-10-CM

## 2024-08-13 ENCOUNTER — MYC MEDICAL ADVICE (OUTPATIENT)
Dept: FAMILY MEDICINE | Facility: CLINIC | Age: 49
End: 2024-08-13
Payer: OTHER GOVERNMENT

## 2024-08-13 RX ORDER — FLUTICASONE PROPIONATE AND SALMETEROL 500; 50 UG/1; UG/1
POWDER RESPIRATORY (INHALATION)
Qty: 60 EACH | Refills: 0 | Status: SHIPPED | OUTPATIENT
Start: 2024-08-13

## 2024-10-31 ENCOUNTER — OFFICE VISIT (OUTPATIENT)
Dept: CARDIOLOGY | Facility: CLINIC | Age: 49
End: 2024-10-31
Attending: NURSE PRACTITIONER
Payer: OTHER GOVERNMENT

## 2024-10-31 VITALS
OXYGEN SATURATION: 95 % | HEART RATE: 85 BPM | SYSTOLIC BLOOD PRESSURE: 100 MMHG | WEIGHT: 206 LBS | BODY MASS INDEX: 27.9 KG/M2 | DIASTOLIC BLOOD PRESSURE: 62 MMHG | HEIGHT: 72 IN

## 2024-10-31 DIAGNOSIS — E78.5 HYPERLIPIDEMIA WITH TARGET LDL LESS THAN 70: ICD-10-CM

## 2024-10-31 DIAGNOSIS — I10 ESSENTIAL HYPERTENSION: ICD-10-CM

## 2024-10-31 DIAGNOSIS — I25.10 ASCVD (ARTERIOSCLEROTIC CARDIOVASCULAR DISEASE): ICD-10-CM

## 2024-10-31 DIAGNOSIS — I25.10 CORONARY ARTERY DISEASE INVOLVING NATIVE CORONARY ARTERY OF NATIVE HEART WITHOUT ANGINA PECTORIS: Primary | ICD-10-CM

## 2024-10-31 DIAGNOSIS — I25.10 CORONARY ARTERY CALCIFICATION: ICD-10-CM

## 2024-10-31 PROCEDURE — G2211 COMPLEX E/M VISIT ADD ON: HCPCS | Performed by: INTERNAL MEDICINE

## 2024-10-31 PROCEDURE — 99214 OFFICE O/P EST MOD 30 MIN: CPT | Performed by: INTERNAL MEDICINE

## 2024-10-31 RX ORDER — ASPIRIN 81 MG/1
81 TABLET ORAL DAILY
COMMUNITY
Start: 2024-10-31

## 2024-10-31 RX ORDER — ROSUVASTATIN CALCIUM 5 MG/1
5 TABLET, COATED ORAL DAILY
Qty: 90 TABLET | Refills: 4 | Status: SHIPPED | OUTPATIENT
Start: 2024-10-31

## 2024-10-31 RX ORDER — OLMESARTAN MEDOXOMIL 40 MG/1
40 TABLET ORAL DAILY
Qty: 90 TABLET | Refills: 4 | Status: SHIPPED | OUTPATIENT
Start: 2024-10-31

## 2024-10-31 RX ORDER — MULTIVITAMIN
1 TABLET ORAL DAILY
COMMUNITY

## 2024-10-31 NOTE — PROGRESS NOTES
HPI and Plan:   It is my pleasure to see your patient Sunny Hayes who is a very pleasant 49-year-old patient with a history of coronary artery disease based upon a positive calcium score of 2.14 which is mainly in the right coronary artery but to a lesser extent in the circumflex artery.  This patient also had a history of chest discomfort and stress echocardiography suggested no evidence of ischemia.  This patient also with a history of essential hypertension.  He has been treated with olmesartan for this successfully.  He is taking rosuvastatin 5 mg/day and his most recent lipid profile in November was excellent with an LDL of 55, HDL of 55 and triglycerides of 77 with a total cholesterol of 125.  His liver function tests last November were normal with an ALT of 25.  He is blood pressure is somewhat on the lower side today at 100/62 but evidently he was up over the last 2 nights with a sick son.  Normally his blood pressure is in the 1 teens systolic.  He has no chest pains or chest pressure and feels well.    Impression:  1.  Coronary artery disease based upon coronary calcification and a calcium score of 2.14.  The patient is asymptomatic and had a negative stress echocardiogram last year.  2.  Essential hypertension.  His blood pressure is well-controlled.  3.  Strong family history of premature coronary atherosclerosis.  4.  Patient is not taking a baby aspirin which I would recommend to him.    Plan:  1.  The patient will start taking 81 mg of aspirin per day on a full stomach.  2.  We will continue all of his present medications  3.  He is due to have a yearly lipid profile in the next few weeks and I will arrange that for him  4.  I will see the patient back again in a years time and I will repeat his lipid profile and basic metabolic profile.    As always the patient be told to contact me if he has any questions or any concerns.    The longitudinal plan of care for the diagnosis(es)/condition(s) as  documented were addressed during this visit. Due to the added complexity in care, I will continue to support Sunny in the subsequent management and with ongoing continuity of care.     Judson Yip MD, FACC, FRCPI      Orders Placed This Encounter   Procedures    Lipid Profile    ALT    Basic metabolic panel    Lipid Profile    ALT    Follow-Up with Cardiology       Orders Placed This Encounter   Medications    multivitamin w/minerals (MULTI-VITAMIN) tablet     Sig: Take 1 tablet by mouth daily.    aspirin 81 MG EC tablet     Sig: Take 1 tablet (81 mg) by mouth daily.    olmesartan (BENICAR) 40 MG tablet     Sig: Take 1 tablet (40 mg) by mouth daily.     Dispense:  90 tablet     Refill:  4    rosuvastatin (CRESTOR) 5 MG tablet     Sig: Take 1 tablet (5 mg) by mouth daily.     Dispense:  90 tablet     Refill:  4       Medications Discontinued During This Encounter   Medication Reason    rosuvastatin (CRESTOR) 5 MG tablet Reorder (No AVS)    olmesartan (BENICAR) 40 MG tablet Reorder (No AVS)         Encounter Diagnoses   Name Primary?    Coronary artery calcification     Hyperlipidemia with target LDL less than 70     Coronary artery disease involving native coronary artery of native heart without angina pectoris Yes    Essential hypertension     ASCVD (arteriosclerotic cardiovascular disease)        CURRENT MEDICATIONS:  Current Outpatient Medications   Medication Sig Dispense Refill    albuterol (PROAIR HFA/PROVENTIL HFA/VENTOLIN HFA) 108 (90 Base) MCG/ACT inhaler Inhale 2 puffs into the lungs every 6 hours as needed for shortness of breath, wheezing or cough 18 g 3    aspirin 81 MG EC tablet Take 1 tablet (81 mg) by mouth daily.      esomeprazole (NEXIUM) 40 MG DR capsule Take 1 capsule (40 mg) by mouth every morning (before breakfast) 90 capsule 3    fluticasone-salmeterol (WIXELA INHUB) 500-50 MCG/ACT inhaler USE 1 INHALATION EVERY 12 HOURS 60 each 0    levothyroxine (SYNTHROID/LEVOTHROID) 150 MCG tablet  Take 1 tablet (150 mcg) by mouth daily 90 tablet 3    multivitamin w/minerals (MULTI-VITAMIN) tablet Take 1 tablet by mouth daily.      olmesartan (BENICAR) 40 MG tablet Take 1 tablet (40 mg) by mouth daily. 90 tablet 4    rosuvastatin (CRESTOR) 5 MG tablet Take 1 tablet (5 mg) by mouth daily. 90 tablet 4       ALLERGIES     Allergies   Allergen Reactions    Ace Inhibitors Unknown     Cough, leg swelling    Bicillin C-R, Unknown    Penicillins Difficulty breathing, Hives, Itching and Swelling     rash         PAST MEDICAL HISTORY:  Past Medical History:   Diagnosis Date    ASCVD (arteriosclerotic cardiovascular disease) 09/15/2023    Chest pain, unspecified type     Gastroesophageal reflux disease     Heart disease     Hypertension     Hypothyroidism     Precordial pain     Uncomplicated asthma        PAST SURGICAL HISTORY:  Past Surgical History:   Procedure Laterality Date    CHOLECYSTECTOMY      COLONOSCOPY      ENT SURGERY      Tonsils    HERNIA REPAIR         FAMILY HISTORY:  Family History   Problem Relation Age of Onset    Diabetes Mother     Cerebrovascular Disease Mother     Coronary Artery Disease Father     Hypertension Father     Hyperlipidemia Father     Obesity Father     Cerebrovascular Disease Maternal Grandfather     Prostate Cancer No family hx of     Colon Cancer No family hx of     Breast Cancer No family hx of        SOCIAL HISTORY:  Social History     Socioeconomic History    Marital status:      Spouse name: None    Number of children: None    Years of education: None    Highest education level: None   Tobacco Use    Smoking status: Former     Current packs/day: 0.00     Average packs/day: 1 pack/day for 15.0 years (15.0 ttl pk-yrs)     Types: Cigarettes, Dip, chew, snus or snuff     Start date: 1990     Quit date: 2005     Years since quittin.8     Passive exposure: Never    Smokeless tobacco: Former     Quit date: 2010   Vaping Use    Vaping status: Never  Used   Substance and Sexual Activity    Alcohol use: Not Currently     Alcohol/week: 1.0 - 2.0 standard drink of alcohol     Types: 1 - 2 Standard drinks or equivalent per week     Comment: 1-2 drinks per week    Drug use: Never    Sexual activity: Yes     Partners: Female     Birth control/protection: Pill   Other Topics Concern    Parent/sibling w/ CABG, MI or angioplasty before 65F 55M? No     Social Drivers of Health     Financial Resource Strain: Low Risk  (2/1/2024)    Financial Resource Strain     Within the past 12 months, have you or your family members you live with been unable to get utilities (heat, electricity) when it was really needed?: No   Food Insecurity: Low Risk  (2/1/2024)    Food Insecurity     Within the past 12 months, did you worry that your food would run out before you got money to buy more?: No     Within the past 12 months, did the food you bought just not last and you didn t have money to get more?: No   Transportation Needs: Low Risk  (2/1/2024)    Transportation Needs     Within the past 12 months, has lack of transportation kept you from medical appointments, getting your medicines, non-medical meetings or appointments, work, or from getting things that you need?: No   Physical Activity: Sufficiently Active (2/1/2024)    Exercise Vital Sign     Days of Exercise per Week: 4 days     Minutes of Exercise per Session: 50 min   Stress: Stress Concern Present (2/1/2024)    [employer] - Occupational Stress Questionnaire     Feeling of Stress : Rather much   Social Connections: Unknown (2/1/2024)    Social Connection and Isolation Panel [NHANES]     Frequency of Social Gatherings with Friends and Family: Never   Interpersonal Safety: Low Risk  (2/2/2024)    Interpersonal Safety     Do you feel physically and emotionally safe where you currently live?: Yes     Within the past 12 months, have you been hit, slapped, kicked or otherwise physically hurt by someone?:  No     Within the past 12 months, have you been humiliated or emotionally abused in other ways by your partner or ex-partner?: No   Housing Stability: Low Risk  (2/1/2024)    Housing Stability     Do you have housing? : Yes     Are you worried about losing your housing?: No       Review of Systems:  Skin:  not assessed       Eyes:  not assessed      ENT:  not assessed      Respiratory:  Positive for sleep apnea;CPAP     Cardiovascular:  Negative      Gastroenterology: not assessed      Genitourinary:  not assessed      Musculoskeletal:  not assessed      Neurologic:  not assessed      Psychiatric:  not assessed      Heme/Lymph/Imm:  not assessed      Endocrine:  not assessed        Physical Exam:  Vitals: /62 (BP Location: Right arm, Patient Position: Sitting, Cuff Size: Adult Large)   Pulse 85   Ht 1.829 m (6')   Wt 93.4 kg (206 lb)   SpO2 95%   BMI 27.94 kg/m      Constitutional:  cooperative, alert and oriented, well developed, well nourished, in no acute distress        Skin:  warm and dry to the touch          Head:  normocephalic        Eyes:  pupils equal and round        Lymph:      ENT:  no pallor or cyanosis        Neck:  carotid pulses are full and equal bilaterally;JVP normal        Respiratory:  normal breath sounds, clear to auscultation, normal A-P diameter, normal symmetry, normal respiratory excursion, no use of accessory muscles         Cardiac: regular rhythm, normal S1/S2, no S3 or S4, apical impulse not displaced, no murmurs, gallops or rubs                pulses full and equal                                        GI:  not assessed this visit        Extremities and Muscular Skeletal:  no deformities, clubbing, cyanosis, erythema observed;no edema              Neurological:  no gross motor deficits;affect appropriate        Psych:  Alert and Oriented x 3        CC  Janelle Heaton NP  [address] [place] [address]

## 2024-10-31 NOTE — LETTER
10/31/2024    Kathryn Hernandez PA-C  [id number] [id number]    RE: Sunny Hayes       Dear Colleague,     I had the pleasure of seeing Sunny Hayes in the [facility].  HPI and Plan:   It is my pleasure to see your patient Sunny Hayes who is a very pleasant 49-year-old patient with a history of coronary artery disease based upon a positive calcium score of 2.14 which is mainly in the right coronary artery but to a lesser extent in the circumflex artery.  This patient also had a history of chest discomfort and stress echocardiography suggested no evidence of ischemia.  This patient also with a history of essential hypertension.  He has been treated with olmesartan for this successfully.  He is taking rosuvastatin 5 mg/day and his most recent lipid profile in November was excellent with an LDL of 55, HDL of 55 and triglycerides of 77 with a total cholesterol of 125.  His liver function tests last November were normal with an ALT of 25.  He is blood pressure is somewhat on the lower side today at 100/62 but evidently he was up over the last 2 nights with a sick son.  Normally his blood pressure is in the 1 teens systolic.  He has no chest pains or chest pressure and feels well.    Impression:  1.  Coronary artery disease based upon coronary calcification and a calcium score of 2.14.  The patient is asymptomatic and had a negative stress echocardiogram last year.  2.  Essential hypertension.  His blood pressure is well-controlled.  3.  Strong family history of premature coronary atherosclerosis.  4.  Patient is not taking a baby aspirin which I would recommend to him.    Plan:  1.  The patient will start taking 81 mg of aspirin per day on a full stomach.  2.  We will continue all of his present medications  3.  He is due to have a yearly lipid profile in the next few weeks and I will arrange that for him  4.  I will see the patient back again in a years time and I will  repeat his lipid profile and basic metabolic profile.    As always the patient be told to contact me if he has any questions or any concerns.    The longitudinal plan of care for the diagnosis(es)/condition(s) as documented were addressed during this visit. Due to the added complexity in care, I will continue to support Sunny in the subsequent management and with ongoing continuity of care.     Judson Yip MD, FACC, FRCPI      Orders Placed This Encounter   Procedures     Lipid Profile     ALT     Basic metabolic panel     Lipid Profile     ALT     Follow-Up with Cardiology       Orders Placed This Encounter   Medications     multivitamin w/minerals (MULTI-VITAMIN) tablet     Sig: Take 1 tablet by mouth daily.     aspirin 81 MG EC tablet     Sig: Take 1 tablet (81 mg) by mouth daily.     olmesartan (BENICAR) 40 MG tablet     Sig: Take 1 tablet (40 mg) by mouth daily.     Dispense:  90 tablet     Refill:  4     rosuvastatin (CRESTOR) 5 MG tablet     Sig: Take 1 tablet (5 mg) by mouth daily.     Dispense:  90 tablet     Refill:  4       Medications Discontinued During This Encounter   Medication Reason     rosuvastatin (CRESTOR) 5 MG tablet Reorder (No AVS)     olmesartan (BENICAR) 40 MG tablet Reorder (No AVS)         Encounter Diagnoses   Name Primary?     Coronary artery calcification      Hyperlipidemia with target LDL less than 70      Coronary artery disease involving native coronary artery of native heart without angina pectoris Yes     Essential hypertension      ASCVD (arteriosclerotic cardiovascular disease)        CURRENT MEDICATIONS:  Current Outpatient Medications   Medication Sig Dispense Refill     albuterol (PROAIR HFA/PROVENTIL HFA/VENTOLIN HFA) 108 (90 Base) MCG/ACT inhaler Inhale 2 puffs into the lungs every 6 hours as needed for shortness of breath, wheezing or cough 18 g 3     aspirin 81 MG EC tablet Take 1 tablet (81 mg) by mouth daily.       esomeprazole (NEXIUM) 40 MG DR capsule Take 1  capsule (40 mg) by mouth every morning (before breakfast) 90 capsule 3     fluticasone-salmeterol (WIXELA INHUB) 500-50 MCG/ACT inhaler USE 1 INHALATION EVERY 12 HOURS 60 each 0     levothyroxine (SYNTHROID/LEVOTHROID) 150 MCG tablet Take 1 tablet (150 mcg) by mouth daily 90 tablet 3     multivitamin w/minerals (MULTI-VITAMIN) tablet Take 1 tablet by mouth daily.       olmesartan (BENICAR) 40 MG tablet Take 1 tablet (40 mg) by mouth daily. 90 tablet 4     rosuvastatin (CRESTOR) 5 MG tablet Take 1 tablet (5 mg) by mouth daily. 90 tablet 4       ALLERGIES     Allergies   Allergen Reactions     Ace Inhibitors Unknown     Cough, leg swelling     Bicillin C-R, Unknown     Penicillins Difficulty breathing, Hives, Itching and Swelling     rash         PAST MEDICAL HISTORY:  Past Medical History:   Diagnosis Date     ASCVD (arteriosclerotic cardiovascular disease) 09/15/2023     Chest pain, unspecified type      Gastroesophageal reflux disease      Heart disease      Hypertension      Hypothyroidism      Precordial pain      Uncomplicated asthma        PAST SURGICAL HISTORY:  Past Surgical History:   Procedure Laterality Date     CHOLECYSTECTOMY       COLONOSCOPY  2021     ENT SURGERY  1980    Tonsils     HERNIA REPAIR  1980       FAMILY HISTORY:  Family History   Problem Relation Age of Onset     Diabetes Mother      Cerebrovascular Disease Mother      Coronary Artery Disease Father      Hypertension Father      Hyperlipidemia Father      Obesity Father      Cerebrovascular Disease Maternal Grandfather      Prostate Cancer No family hx of      Colon Cancer No family hx of      Breast Cancer No family hx of        SOCIAL HISTORY:  Social History     Socioeconomic History     Marital status:      Spouse name: None     Number of children: None     Years of education: None     Highest education level: None   Tobacco Use     Smoking status: Former     Current packs/day: 0.00     Average packs/day: 1 pack/day for 15.0  years (15.0 ttl pk-yrs)     Types: Cigarettes, Dip, chew, snus or snuff     Start date: 1990     Quit date: 2005     Years since quittin.8     Passive exposure: Never     Smokeless tobacco: Former     Quit date: 2010   Vaping Use     Vaping status: Never Used   Substance and Sexual Activity     Alcohol use: Not Currently     Alcohol/week: 1.0 - 2.0 standard drink of alcohol     Types: 1 - 2 Standard drinks or equivalent per week     Comment: 1-2 drinks per week     Drug use: Never     Sexual activity: Yes     Partners: Female     Birth control/protection: Pill   Other Topics Concern     Parent/sibling w/ CABG, MI or angioplasty before 65F 55M? No     Social Drivers of Health     Financial Resource Strain: Low Risk  (2024)    Financial Resource Strain      Within the past 12 months, have you or your family members you live with been unable to get utilities (heat, electricity) when it was really needed?: No   Food Insecurity: Low Risk  (2024)    Food Insecurity      Within the past 12 months, did you worry that your food would run out before you got money to buy more?: No      Within the past 12 months, did the food you bought just not last and you didn t have money to get more?: No   Transportation Needs: Low Risk  (2024)    Transportation Needs      Within the past 12 months, has lack of transportation kept you from medical appointments, getting your medicines, non-medical meetings or appointments, work, or from getting things that you need?: No   Physical Activity: Sufficiently Active (2024)    Exercise Vital Sign      Days of Exercise per Week: 4 days      Minutes of Exercise per Session: 50 min   Stress: Stress Concern Present (2024)    [employer] - Occupational Stress Questionnaire      Feeling of Stress : Rather much   Social Connections: Unknown (2024)    Social Connection and Isolation Panel [NHANES]      Frequency of Social Gatherings  with Friends and Family: Never   Interpersonal Safety: Low Risk  (2/2/2024)    Interpersonal Safety      Do you feel physically and emotionally safe where you currently live?: Yes      Within the past 12 months, have you been hit, slapped, kicked or otherwise physically hurt by someone?: No      Within the past 12 months, have you been humiliated or emotionally abused in other ways by your partner or ex-partner?: No   Housing Stability: Low Risk  (2/1/2024)    Housing Stability      Do you have housing? : Yes      Are you worried about losing your housing?: No       Review of Systems:  Skin:  not assessed       Eyes:  not assessed      ENT:  not assessed      Respiratory:  Positive for sleep apnea;CPAP     Cardiovascular:  Negative      Gastroenterology: not assessed      Genitourinary:  not assessed      Musculoskeletal:  not assessed      Neurologic:  not assessed      Psychiatric:  not assessed      Heme/Lymph/Imm:  not assessed      Endocrine:  not assessed        Physical Exam:  Vitals: /62 (BP Location: Right arm, Patient Position: Sitting, Cuff Size: Adult Large)   Pulse 85   Ht 1.829 m (6')   Wt 93.4 kg (206 lb)   SpO2 95%   BMI 27.94 kg/m      Constitutional:  cooperative, alert and oriented, well developed, well nourished, in no acute distress        Skin:  warm and dry to the touch          Head:  normocephalic        Eyes:  pupils equal and round        Lymph:      ENT:  no pallor or cyanosis        Neck:  carotid pulses are full and equal bilaterally;JVP normal        Respiratory:  normal breath sounds, clear to auscultation, normal A-P diameter, normal symmetry, normal respiratory excursion, no use of accessory muscles         Cardiac: regular rhythm, normal S1/S2, no S3 or S4, apical impulse not displaced, no murmurs, gallops or rubs                pulses full and equal                                        GI:  not assessed this visit        Extremities and Muscular Skeletal:  no  deformities, clubbing, cyanosis, erythema observed;no edema              Neurological:  no gross motor deficits;affect appropriate        Psych:  Alert and Oriented x 3        CC  Janelle Heaton NP  [id number] [id number]                  Thank you for allowing me to participate in the care of your patient.      Sincerely,     Judson Yip MD, MD     [facility]  cc:   Janelle Heaton NP  [id number] [id number]

## 2024-11-04 ENCOUNTER — OFFICE VISIT (OUTPATIENT)
Dept: FAMILY MEDICINE | Facility: CLINIC | Age: 49
End: 2024-11-04
Payer: OTHER GOVERNMENT

## 2024-11-04 VITALS
HEART RATE: 80 BPM | BODY MASS INDEX: 27.77 KG/M2 | SYSTOLIC BLOOD PRESSURE: 100 MMHG | RESPIRATION RATE: 16 BRPM | WEIGHT: 205 LBS | HEIGHT: 72 IN | OXYGEN SATURATION: 97 % | TEMPERATURE: 98.4 F | DIASTOLIC BLOOD PRESSURE: 67 MMHG

## 2024-11-04 DIAGNOSIS — J45.40 MODERATE PERSISTENT ASTHMA WITHOUT COMPLICATION: ICD-10-CM

## 2024-11-04 PROCEDURE — G2211 COMPLEX E/M VISIT ADD ON: HCPCS | Performed by: PHYSICIAN ASSISTANT

## 2024-11-04 PROCEDURE — 99213 OFFICE O/P EST LOW 20 MIN: CPT | Performed by: PHYSICIAN ASSISTANT

## 2024-11-04 RX ORDER — FLUTICASONE PROPIONATE AND SALMETEROL 500; 50 UG/1; UG/1
1 POWDER RESPIRATORY (INHALATION) 2 TIMES DAILY
Qty: 60 EACH | Refills: 11 | Status: SHIPPED | OUTPATIENT
Start: 2024-11-04

## 2024-11-04 ASSESSMENT — ASTHMA QUESTIONNAIRES
ACT_TOTALSCORE: 25
QUESTION_5 LAST FOUR WEEKS HOW WOULD YOU RATE YOUR ASTHMA CONTROL: COMPLETELY CONTROLLED
ACT_TOTALSCORE: 25
QUESTION_4 LAST FOUR WEEKS HOW OFTEN HAVE YOU USED YOUR RESCUE INHALER OR NEBULIZER MEDICATION (SUCH AS ALBUTEROL): NOT AT ALL
QUESTION_3 LAST FOUR WEEKS HOW OFTEN DID YOUR ASTHMA SYMPTOMS (WHEEZING, COUGHING, SHORTNESS OF BREATH, CHEST TIGHTNESS OR PAIN) WAKE YOU UP AT NIGHT OR EARLIER THAN USUAL IN THE MORNING: NOT AT ALL
QUESTION_2 LAST FOUR WEEKS HOW OFTEN HAVE YOU HAD SHORTNESS OF BREATH: NOT AT ALL
QUESTION_1 LAST FOUR WEEKS HOW MUCH OF THE TIME DID YOUR ASTHMA KEEP YOU FROM GETTING AS MUCH DONE AT WORK, SCHOOL OR AT HOME: NONE OF THE TIME

## 2024-11-04 ASSESSMENT — PAIN SCALES - GENERAL: PAINLEVEL_OUTOF10: EXTREME PAIN (8)

## 2024-11-04 NOTE — PROGRESS NOTES
Assessment & Plan     Moderate persistent asthma without complication  Chronic, well controlled. Continue present management. Does not need albuterol inhaler refill at this time.  - fluticasone-salmeterol (WIXELA INHUB) 500-50 MCG/ACT inhaler; Inhale 1 puff into the lungs 2 times daily.    The longitudinal plan of care for the diagnosis(es)/condition(s) as documented were addressed during this visit. Due to the added complexity in care, I will continue to support Sunny in the subsequent management and with ongoing continuity of care.      Subjective   Sunny is a 49 year old, presenting for the following health issues:  Refill Request        11/4/2024     2:14 PM   Additional Questions   Roomed by faustino frey   Accompanied by self         11/4/2024     2:14 PM   Patient Reported Additional Medications   Patient reports taking the following new medications na     History of Present Illness     Asthma:  He presents for follow up of asthma.  He has no cough, no wheezing, and no shortness of breath.  He is using a relief medication a few times a month. He typically misses taking his controller medication 1 time(s) per week. Patient is aware of the following triggers: animal dander, exercise or sports and strong odors and fumes. The patient has not had a visit to the Emergency Room, Urgent Care or Hospital due to asthma since the last clinic visit.     He eats 4 or more servings of fruits and vegetables daily.He consumes 0 sweetened beverage(s) daily.He exercises with enough effort to increase his heart rate 30 to 60 minutes per day.  He exercises with enough effort to increase his heart rate 5 days per week.   He is taking medications regularly.     Moderate persistent asthma  Feels well controlled.  Advair BID (rinses mouth after use)  Albuterol rarely, hasn't needed in about 1 month (often only needs if allergies are worse)      6/26/2023     5:52 PM 2/1/2024     1:46 PM 11/4/2024     2:10 PM   ACT Total Scores   ACT TOTAL  SCORE (Goal Greater than or Equal to 20) 25 22 25    In the past 12 months, how many times did you visit the emergency room for your asthma without being admitted to the hospital? 0 0  0    In the past 12 months, how many times were you hospitalized overnight because of your asthma? 0 0  0        Patient-reported         Objective    /67   Pulse 80   Temp 98.4  F (36.9  C) (Oral)   Resp 16   Ht 1.829 m (6')   Wt 93 kg (205 lb)   SpO2 97%   BMI 27.80 kg/m    Body mass index is 27.8 kg/m .  Physical Exam   GENERAL: alert and no distress  NECK: no adenopathy  RESP: lungs clear to auscultation - no rales, rhonchi or wheezes  CV: regular rate and rhythm, normal S1 S2, no S3 or S4, no murmur, click or rub  PSYCH: mentation appears normal, affect normal/bright          Signed Electronically by: Kathryn Hernandez PA-C

## 2024-11-11 ENCOUNTER — LAB (OUTPATIENT)
Dept: LAB | Facility: CLINIC | Age: 49
End: 2024-11-11
Payer: OTHER GOVERNMENT

## 2024-11-11 ENCOUNTER — VIRTUAL VISIT (OUTPATIENT)
Dept: UROLOGY | Facility: CLINIC | Age: 49
End: 2024-11-11
Payer: OTHER GOVERNMENT

## 2024-11-11 DIAGNOSIS — E78.5 HYPERLIPIDEMIA WITH TARGET LDL LESS THAN 70: ICD-10-CM

## 2024-11-11 DIAGNOSIS — N40.1 BENIGN PROSTATIC HYPERPLASIA WITH NOCTURIA: Primary | ICD-10-CM

## 2024-11-11 DIAGNOSIS — R35.1 BENIGN PROSTATIC HYPERPLASIA WITH NOCTURIA: Primary | ICD-10-CM

## 2024-11-11 DIAGNOSIS — I25.10 CORONARY ARTERY DISEASE INVOLVING NATIVE CORONARY ARTERY OF NATIVE HEART WITHOUT ANGINA PECTORIS: ICD-10-CM

## 2024-11-11 LAB
ALT SERPL W P-5'-P-CCNC: 24 U/L (ref 0–70)
CHOLEST SERPL-MCNC: 134 MG/DL
FASTING STATUS PATIENT QL REPORTED: YES
HDLC SERPL-MCNC: 57 MG/DL
LDLC SERPL CALC-MCNC: 65 MG/DL
NONHDLC SERPL-MCNC: 77 MG/DL
TRIGL SERPL-MCNC: 59 MG/DL

## 2024-11-11 PROCEDURE — 82465 ASSAY BLD/SERUM CHOLESTEROL: CPT

## 2024-11-11 PROCEDURE — 36415 COLL VENOUS BLD VENIPUNCTURE: CPT

## 2024-11-11 PROCEDURE — 80061 LIPID PANEL: CPT

## 2024-11-11 PROCEDURE — 99203 OFFICE O/P NEW LOW 30 MIN: CPT | Mod: 95 | Performed by: NURSE PRACTITIONER

## 2024-11-11 PROCEDURE — 84460 ALANINE AMINO (ALT) (SGPT): CPT

## 2024-11-11 RX ORDER — SILDENAFIL 25 MG/1
25-100 TABLET, FILM COATED ORAL DAILY PRN
Qty: 30 TABLET | Refills: 11 | Status: SHIPPED | OUTPATIENT
Start: 2024-11-11

## 2024-11-11 RX ORDER — TAMSULOSIN HYDROCHLORIDE 0.4 MG/1
0.4 CAPSULE ORAL DAILY
Qty: 90 CAPSULE | Refills: 4 | Status: SHIPPED | OUTPATIENT
Start: 2024-11-11

## 2024-11-11 NOTE — LETTER
11/11/2024       RE: Sunny Hayes  [address] [facility] [address]     Dear Colleague,    Thank you for referring your patient, Sunny Hayes, to the [facility] at [facility]. Please see a copy of my visit note below.      Urology Outpatient Visit    Name: Sunny Hayes    MRN: [id number]   YOB: 1975               Chief Complaint:   LUTS         Impression and Plan:   Impression / Plan:   Sunny Hayes is a 49 year old male with BPH w LUTS, ED      It was my pleasure to meet with Mr. Hayes in clinic today to discuss his lower urinary tract symptoms. Patient presentation is not consistent with prostate cancer, UTI, urinary obstruction, prostatitis, neurogenic bladder, diabetes, nor diuretic related. I explained that his lower urinary tract symptoms are likely secondary to benign prostatic hyperplasia (BPH). We reviewed the natural history of BPH, its prevalence, and management options. We discussed that, in general, treatment of BPH is based on the extent of bother caused by lower urinary tract symptoms. We then reviewed options for ongoing management including observation/watchful waiting, lifestyle modifications, medical management, vs. other surgical options. Pros and cons of these options were discussed in detail. All patient questions, comments, concerns addressed.     -After shared decision making process, will go forth with daily tamsulosin. Pt educated that Flomax works by relaxing the muscles in the prostate, opening the prostatic urethra allowing for easier flow of urine. Discussed Tamsulosin is associated with orthostatic hypotension in ~1/10 patients, which can manifest as dizziness, especially upon standing. Discussed other potential side effects including rhinitis, headache. Educated that Flomax typically takes ~1 week to reach maximum therapeutic effect.     -Plan to initiate low-dose sildenafil.  Patient was educated they may titrate the dose up to a maximum of 100 mg if incomplete response at lower doses. Discussed less common, more serious potential adverse effects including sudden hearing loss, hypotension, priapism, visual disturbance, as well as more common side effects including: flushing, diarrhea, headache, dyspepsia. Discussed that priapism is a medical emergency defined as an erection that lasts longer than four hours. While rare, priapism can cause permanent complications if not treated promptly. Discussed with patient this medication works promoting smooth muscle relaxation and inflow of blood to the corpus cavernosum through enhancing the effect of NO. Pt informed this medicine has no effect in the absence of sexual stimulation.    Follow-up in a few months for Sx recheck.     Thank you for the opportunity to participate in the care of Sunny Hayes.     KASANDRA Howell, CNP  [facility] - Department of Urology  [phone]          History of Present Illness:   Sunny Hayes is a 49 year old male with CC of urinary frequency and urgency ongoing for the past year. Nocturia 2-3x, strength of urinary stream is weaker/splits towards end of stream. No dysuria or hematuria. Former smoker 15 pack yr hx. He reports he is having some problems with ED, he reports he had this happen remotely in the past due to life stress.    PSA 0.39 02/02/2024     History is obtained from patient & EMR          Past Medical History:     Past Medical History:   Diagnosis Date     ASCVD (arteriosclerotic cardiovascular disease) 09/15/2023     Chest pain, unspecified type      Gastroesophageal reflux disease      Heart disease      Hypertension      Hypothyroidism      Precordial pain      Uncomplicated asthma           Past Surgical History:     Past Surgical History:   Procedure Laterality Date     CHOLECYSTECTOMY       COLONOSCOPY  2021     ENT SURGERY  1980    Tonsils     HERNIA REPAIR  1980          Social  History:     Social History     Tobacco Use     Smoking status: Former     Current packs/day: 0.00     Average packs/day: 1 pack/day for 15.0 years (15.0 ttl pk-yrs)     Types: Cigarettes, Dip, chew, snus or snuff     Start date: 1990     Quit date: 2005     Years since quittin.8     Passive exposure: Never     Smokeless tobacco: Former     Quit date: 2010   Substance Use Topics     Alcohol use: Not Currently     Alcohol/week: 1.0 - 2.0 standard drink of alcohol     Types: 1 - 2 Standard drinks or equivalent per week     Comment: 1-2 drinks per week          Family History:     Family History   Problem Relation Age of Onset     Diabetes Mother      Cerebrovascular Disease Mother      Coronary Artery Disease Father      Hypertension Father      Hyperlipidemia Father      Obesity Father      Cerebrovascular Disease Maternal Grandfather      Prostate Cancer No family hx of      Colon Cancer No family hx of      Breast Cancer No family hx of           Allergies:     Allergies   Allergen Reactions     Ace Inhibitors Unknown     Cough, leg swelling     Bicillin C-R, Unknown     Penicillins Difficulty breathing, Hives, Itching and Swelling     rash            Medications:     Current Outpatient Medications   Medication Sig Dispense Refill     albuterol (PROAIR HFA/PROVENTIL HFA/VENTOLIN HFA) 108 (90 Base) MCG/ACT inhaler Inhale 2 puffs into the lungs every 6 hours as needed for shortness of breath, wheezing or cough 18 g 3     aspirin 81 MG EC tablet Take 1 tablet (81 mg) by mouth daily.       esomeprazole (NEXIUM) 40 MG DR capsule Take 1 capsule (40 mg) by mouth every morning (before breakfast) 90 capsule 3     fluticasone-salmeterol (WIXELA INHUB) 500-50 MCG/ACT inhaler Inhale 1 puff into the lungs 2 times daily. 60 each 11     levothyroxine (SYNTHROID/LEVOTHROID) 150 MCG tablet Take 1 tablet (150 mcg) by mouth daily 90 tablet 3     multivitamin w/minerals (MULTI-VITAMIN) tablet Take 1 tablet by mouth  daily.       olmesartan (BENICAR) 40 MG tablet Take 1 tablet (40 mg) by mouth daily. 90 tablet 4     rosuvastatin (CRESTOR) 5 MG tablet Take 1 tablet (5 mg) by mouth daily. 90 tablet 4     No current facility-administered medications for this visit.          Review of Systems:    ROS: See HPI for pertinent details.  Remainder of 10-point ROS negative.         Physical Exam:   VS:  T: Data Unavailable    HR: Data Unavailable    BP: Data Unavailable    RR: Data Unavailable     GEN:  Alert.  NAD.   HEENT:  Sclerae anicteric.    CV:  No obvious jugular venous distension.  LUNGS: No respiratory distress, breathing comfortably wo accessory muscle use.  ABD:  ND.     SKIN:  Dry. No visible rashes.   NEURO:  CN grossly intact.          Laboratory Data:     Lab Results   Component Value Date    PSA 0.39 02/02/2024     Lab Results   Component Value Date    CR 0.89 02/02/2024    CR 0.85 07/08/2023    CR 1.00 06/27/2023    CR 0.81 12/20/2021     Lab Results   Component Value Date    GFRESTIMATED >90 02/02/2024    GFRESTIMATED >90 07/08/2023    GFRESTIMATED >90 06/27/2023    GFRESTIMATED >90 12/20/2021            Again, thank you for allowing me to participate in the care of your patient.      Sincerely,    KASANDRA De Luna CNP

## 2024-11-11 NOTE — PROGRESS NOTES
Urology Outpatient Visit    Name: Sunny Hayes    MRN: [id number]   YOB: 1975               Chief Complaint:   LUTS         Impression and Plan:   Impression / Plan:   Sunny Hayes is a 49 year old male with BPH w LUTS, ED      It was my pleasure to meet with Mr. Hayes in clinic today to discuss his lower urinary tract symptoms. Patient presentation is not consistent with prostate cancer, UTI, urinary obstruction, prostatitis, neurogenic bladder, diabetes, nor diuretic related. I explained that his lower urinary tract symptoms are likely secondary to benign prostatic hyperplasia (BPH). We reviewed the natural history of BPH, its prevalence, and management options. We discussed that, in general, treatment of BPH is based on the extent of bother caused by lower urinary tract symptoms. We then reviewed options for ongoing management including observation/watchful waiting, lifestyle modifications, medical management, vs. other surgical options. Pros and cons of these options were discussed in detail. All patient questions, comments, concerns addressed.     -After shared decision making process, will go forth with daily tamsulosin. Pt educated that Flomax works by relaxing the muscles in the prostate, opening the prostatic urethra allowing for easier flow of urine. Discussed Tamsulosin is associated with orthostatic hypotension in ~1/10 patients, which can manifest as dizziness, especially upon standing. Discussed other potential side effects including rhinitis, headache. Educated that Flomax typically takes ~1 week to reach maximum therapeutic effect.     -Plan to initiate low-dose sildenafil. Patient was educated they may titrate the dose up to a maximum of 100 mg if incomplete response at lower doses. Discussed less common, more serious potential adverse effects including sudden hearing loss, hypotension, priapism, visual disturbance, as well as more common side effects including: flushing,  diarrhea, headache, dyspepsia. Discussed that priapism is a medical emergency defined as an erection that lasts longer than four hours. While rare, priapism can cause permanent complications if not treated promptly. Discussed with patient this medication works promoting smooth muscle relaxation and inflow of blood to the corpus cavernosum through enhancing the effect of NO. Pt informed this medicine has no effect in the absence of sexual stimulation.    Follow-up in a few months for Sx recheck.     Thank you for the opportunity to participate in the care of Sunny Hayes.     KASANDRA Howell, CNP  [facility] - Department of Urology  [phone]          History of Present Illness:   Sunny Hayes is a 49 year old male with CC of urinary frequency and urgency ongoing for the past year. Nocturia 2-3x, strength of urinary stream is weaker/splits towards end of stream. No dysuria or hematuria. Former smoker 15 pack yr hx. He reports he is having some problems with ED, he reports he had this happen remotely in the past due to life stress.    PSA 0.39 2024     History is obtained from patient & EMR          Past Medical History:     Past Medical History:   Diagnosis Date    ASCVD (arteriosclerotic cardiovascular disease) 09/15/2023    Chest pain, unspecified type     Gastroesophageal reflux disease     Heart disease     Hypertension     Hypothyroidism     Precordial pain     Uncomplicated asthma           Past Surgical History:     Past Surgical History:   Procedure Laterality Date    CHOLECYSTECTOMY      COLONOSCOPY      ENT SURGERY      Tonsils    HERNIA REPAIR            Social History:     Social History     Tobacco Use    Smoking status: Former     Current packs/day: 0.00     Average packs/day: 1 pack/day for 15.0 years (15.0 ttl pk-yrs)     Types: Cigarettes, Dip, chew, snus or snuff     Start date: 1990     Quit date: 2005     Years since quittin.8     Passive exposure:  Never    Smokeless tobacco: Former     Quit date: 1/1/2010   Substance Use Topics    Alcohol use: Not Currently     Alcohol/week: 1.0 - 2.0 standard drink of alcohol     Types: 1 - 2 Standard drinks or equivalent per week     Comment: 1-2 drinks per week          Family History:     Family History   Problem Relation Age of Onset    Diabetes Mother     Cerebrovascular Disease Mother     Coronary Artery Disease Father     Hypertension Father     Hyperlipidemia Father     Obesity Father     Cerebrovascular Disease Maternal Grandfather     Prostate Cancer No family hx of     Colon Cancer No family hx of     Breast Cancer No family hx of           Allergies:     Allergies   Allergen Reactions    Ace Inhibitors Unknown     Cough, leg swelling    Bicillin C-R, Unknown    Penicillins Difficulty breathing, Hives, Itching and Swelling     rash            Medications:     Current Outpatient Medications   Medication Sig Dispense Refill    albuterol (PROAIR HFA/PROVENTIL HFA/VENTOLIN HFA) 108 (90 Base) MCG/ACT inhaler Inhale 2 puffs into the lungs every 6 hours as needed for shortness of breath, wheezing or cough 18 g 3    aspirin 81 MG EC tablet Take 1 tablet (81 mg) by mouth daily.      esomeprazole (NEXIUM) 40 MG DR capsule Take 1 capsule (40 mg) by mouth every morning (before breakfast) 90 capsule 3    fluticasone-salmeterol (WIXELA INHUB) 500-50 MCG/ACT inhaler Inhale 1 puff into the lungs 2 times daily. 60 each 11    levothyroxine (SYNTHROID/LEVOTHROID) 150 MCG tablet Take 1 tablet (150 mcg) by mouth daily 90 tablet 3    multivitamin w/minerals (MULTI-VITAMIN) tablet Take 1 tablet by mouth daily.      olmesartan (BENICAR) 40 MG tablet Take 1 tablet (40 mg) by mouth daily. 90 tablet 4    rosuvastatin (CRESTOR) 5 MG tablet Take 1 tablet (5 mg) by mouth daily. 90 tablet 4     No current facility-administered medications for this visit.          Review of Systems:    ROS: See HPI for pertinent details.  Remainder of 10-point  ROS negative.         Physical Exam:   VS:  T: Data Unavailable    HR: Data Unavailable    BP: Data Unavailable    RR: Data Unavailable     GEN:  Alert.  NAD.   HEENT:  Sclerae anicteric.    CV:  No obvious jugular venous distension.  LUNGS: No respiratory distress, breathing comfortably wo accessory muscle use.  ABD:  ND.     SKIN:  Dry. No visible rashes.   NEURO:  CN grossly intact.          Laboratory Data:     Lab Results   Component Value Date    PSA 0.39 02/02/2024     Lab Results   Component Value Date    CR 0.89 02/02/2024    CR 0.85 07/08/2023    CR 1.00 06/27/2023    CR 0.81 12/20/2021     Lab Results   Component Value Date    GFRESTIMATED >90 02/02/2024    GFRESTIMATED >90 07/08/2023    GFRESTIMATED >90 06/27/2023    GFRESTIMATED >90 12/20/2021

## 2024-11-11 NOTE — PATIENT INSTRUCTIONS
Causes of Erectile Dysfunction    Erectile dysfunction has many possible causes and can be the first symptom of an undiagnosed condition. Erections are caused by the balance of blood flow into and out of the penis. Conditions that result in changes in the penis  blood flow are common causes of ED. The 2 most common medical problems that may cause ED are atherosclerosis (hardening of the arteries) and diabetes. Obesity is also associated with both blood vessel changes and hormone changes that negatively affect erections. Another cause of ED is damage to the nerves involved in getting erections. This can happen with diseases of the nervous system (eg, multiple sclerosis, Parkinson disease) or with surgery (eg, for prostate cancer). Hormone problems, like low testosterone, and side effects of medications can also cause ED.    The mind and body work together when an erection occurs, so emotional or psychological factors can also cause difficulty getting or keeping an erection.      Treatments for Erectile Dysfunction    Depending upon the cause of ED, treatment may include one or more of the following:    Lifestyle changes -- Improving diet, exercise, and sleep and reducing stress can all potentially improve sexual problems such as ED and low libido.    Drugs and alcohol -- Ask your doctor if one of your medications could be contributing to your ED. In some cases, there are different medications you could use instead. Quitting smoking and reducing or stopping alcohol can also be beneficial. If you are having trouble quitting smoking or cutting back on alcohol, your doctor can help.    Phosphodiesterase-5 inhibitors -- Phosphodiesterase-5 (PDE-5) inhibitors work by increasing chemicals that allow the penis to become and remain erect. PDE-5 inhibitors open the blood vessels in the penis and allow more blood flow to come into the penis. They help a male to achieve an erection after sexual stimulation, but the  "medication does not increase sexual desire. These medications require sexual stimulation to cause an erection.    Penile self-injection -- With penile self-injection, the person injects a medication into the corpora cavernosa (the two chambers of the penis that are filled with spongy tissue and blood). This causes an erection by allowing the blood vessels within the penis to expand so that the penis first swells and then stiffens to create a fully rigid erection. The erection created by penile injection occurs without sexual stimulation (different from the erection that occurs after taking sildenafil, vardenafil, or tadalafil). Side effects -- Pain is the most common side effect. Some males can develop scar tissue in the penis.  There is also a small risk that the penis will remain erect after intercourse. Prolonged erection, called \"priapism,\" that lasts longer than four hours is a medical emergency. Contact your health care provider immediately if this happens. An emergency procedure must be done as soon as possible to empty the blood that is trapped in the penis. An erection that lasts longer than 48 hours often results in scarring of the tissue inside the penis.    Vacuum-assisted erection devices -- There are several products on the market that involve placing the penis in a plastic cylinder and creating a vacuum around the penis. This increases blood flow into the penis. A rigid ring is placed at the base of the penis (near the body) to hold the blood inside the penis, allowing it to remain erect. Vacuum devices successfully create erections in as many as 67 percent of cases. Satisfaction with vacuum-assisted erections varies between 25 and 49 percent. Vacuum-assisted devices require that males be able to hold and pump the unit. It may take a week or more for the device to work effectively. After a male is accustomed to using the device, he can usually create an erection that is rigid enough for penetration " and sexual intercourse. He may not be able to ejaculate because the ring that holds blood in the penis also compresses the urethra, preventing semen from exiting. The ability to have an orgasm is not affected by the ring.    Penile prostheses -- A penile prosthesis is a device that is surgically implanted and inflates to allow the penis to become erect. Penile prostheses can be semi-rigid rods or inflatable cylinders that are inserted into the corpora cavernosa. Penile prostheses are used less frequently because of the popularity of PDE-5 inhibitors and penile injection therapies. For males who do not respond to these therapies or who find vacuum erection therapy ineffective, penile prostheses are an option. Side effects -- Side effects of prosthetic devices include the possibility of infection, erosion, pain, and mechanical failure. Mechanical failure may require surgically removing the prosthesis and implanting a new one.     Psychotherapy and psychoactive medications -- Depression and anxiety can cause ED. Often these problems can be treated using psychological counseling, antidepressant drugs, or both. If you are struggling with performance anxiety, your health care provider may refer you to a certified sexual therapy counselor.

## 2024-11-12 ENCOUNTER — TELEPHONE (OUTPATIENT)
Dept: UROLOGY | Facility: CLINIC | Age: 49
End: 2024-11-12
Payer: OTHER GOVERNMENT

## 2024-11-12 NOTE — TELEPHONE ENCOUNTER
----- Message from Damaris CHAMBERS sent at 11/12/2024  9:48 AM CST -----  Regarding: Follow up  Return for fu w me for sx recheck sometime in the next few months .    SSJ  11/11/24

## 2024-11-12 NOTE — TELEPHONE ENCOUNTER
Spoke to the pt and he will call back in a few weeks to schedule the follow up appt with Arthur once he has his work schedule.

## 2024-11-18 ENCOUNTER — TELEPHONE (OUTPATIENT)
Dept: FAMILY MEDICINE | Facility: CLINIC | Age: 49
End: 2024-11-18
Payer: OTHER GOVERNMENT

## 2025-02-05 ENCOUNTER — VIRTUAL VISIT (OUTPATIENT)
Dept: FAMILY MEDICINE | Facility: CLINIC | Age: 50
End: 2025-02-05
Attending: PHYSICIAN ASSISTANT
Payer: COMMERCIAL

## 2025-02-05 DIAGNOSIS — R11.2 NAUSEA AND VOMITING, UNSPECIFIED VOMITING TYPE: Primary | ICD-10-CM

## 2025-02-05 DIAGNOSIS — K52.9 GASTROENTERITIS: ICD-10-CM

## 2025-02-05 PROCEDURE — 98006 SYNCH AUDIO-VIDEO EST MOD 30: CPT | Performed by: PHYSICIAN ASSISTANT

## 2025-02-05 RX ORDER — ONDANSETRON 4 MG/1
4 TABLET, ORALLY DISINTEGRATING ORAL EVERY 8 HOURS PRN
Qty: 10 TABLET | Refills: 0 | Status: SHIPPED | OUTPATIENT
Start: 2025-02-05

## 2025-02-05 NOTE — PROGRESS NOTES
Sunny is a 50 year old who is being evaluated via a billable video visit.    How would you like to obtain your AVS? Sathishhart  If the video visit is dropped, the invitation should be resent by: Text to cell phone: [phone]  Will anyone else be joining your video visit? No      Assessment & Plan     Nausea and vomiting, unspecified vomiting type  Gastroenteritis  Symptoms started last night. Currently only taking sips of water but not able to tolerate much. He has not urinated for about 12 hours. We discussed concerns for dehydration and possible need for further evaluation/fluids. 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 They are comfortable trying zofran for nausea and seeing if this allows him to increase fluids at home but both agree to closely monitor and go to urgency room or ER if not able to tolerate fluids or if he doesn't start urinating soon. Discussed plan of care and reasons to return to clinic or present to the ED (emergency department). Patient and wife in agreement with plan, questions answered.   - ondansetron (ZOFRAN ODT) 4 MG ODT tab; Take 1 tablet (4 mg) by mouth every 8 hours as needed for nausea.    He was initially scheduled for physical today but will reschedule visit due to his current illness.    Subjective   Sunny is a 50 year old, presenting for the following health issues:  Abdominal Pain    HPI     Acute Illness  Acute illness concerns:   Onset/Duration: started last night  Symptoms:  Fever: YES - tactile  Chills/Sweats: YES  Headache (location?): No  Sinus Pressure: No  Conjunctivitis:  No  Ear Pain: no  Rhinorrhea: No  Congestion: No  Sore Throat: No  Cough: no  Wheeze: No  Decreased Appetite: YES  Nausea: YES  Vomiting: YES  Diarrhea: YES  Dysuria/Freq.: No  Dysuria or Hematuria: No  Fatigue/Achiness: No  Sick/Strep Exposure: YES  Therapies tried and outcome: None    Started feeling sick last night  Up most of the night with vomiting and diarrhea  Still feeling nauseated,  vomiting and diarrhea have subsided  Trying to take sips of water but not able to drink much due to nausea  Has not urinated for 12 hours  No severe abdominal pain  No blood in stool or vomit    Wife had similar illness a few days ago, lasted 3 days, doing better now    Review of Systems  Constitutional, HEENT, cardiovascular, pulmonary, gi and gu systems are negative, except as otherwise noted.      Objective           Vitals:  No vitals were obtained today due to virtual visit.    Physical Exam   GENERAL: alert and no distress  EYES: Eyes grossly normal to inspection.   RESP: No audible wheeze, cough, or visible cyanosis.    SKIN: Visible skin clear.   NEURO: Cranial nerves grossly intact.  Mentation and speech appropriate for age.  PSYCH: Appropriate affect, tone, and pace of words        Video-Visit Details    Type of service:  Video Visit   Originating Location (pt. Location): Home    Distant Location (provider location):  On-site  Platform used for Video Visit: Anna  Signed Electronically by: Kathryn Hernandez PA-C

## 2025-03-13 ENCOUNTER — MYC MEDICAL ADVICE (OUTPATIENT)
Dept: FAMILY MEDICINE | Facility: CLINIC | Age: 50
End: 2025-03-13
Payer: COMMERCIAL

## 2025-03-14 ENCOUNTER — LAB (OUTPATIENT)
Dept: LAB | Facility: CLINIC | Age: 50
End: 2025-03-14
Payer: COMMERCIAL

## 2025-03-14 DIAGNOSIS — Z11.1 SCREENING EXAMINATION FOR PULMONARY TUBERCULOSIS: ICD-10-CM

## 2025-03-14 PROCEDURE — 36415 COLL VENOUS BLD VENIPUNCTURE: CPT

## 2025-03-14 PROCEDURE — 86481 TB AG RESPONSE T-CELL SUSP: CPT

## 2025-03-15 LAB
GAMMA INTERFERON BACKGROUND BLD IA-ACNC: 0.02 IU/ML
M TB IFN-G BLD-IMP: NEGATIVE
M TB IFN-G CD4+ BCKGRND COR BLD-ACNC: 9.98 IU/ML
MITOGEN IGNF BCKGRD COR BLD-ACNC: 0 IU/ML
MITOGEN IGNF BCKGRD COR BLD-ACNC: 0 IU/ML
QUANTIFERON MITOGEN: 10 IU/ML
QUANTIFERON NIL TUBE: 0.02 IU/ML
QUANTIFERON TB1 TUBE: 0.02 IU/ML
QUANTIFERON TB2 TUBE: 0.02

## 2025-03-19 ENCOUNTER — TRANSFERRED RECORDS (OUTPATIENT)
Dept: LAB | Facility: CLINIC | Age: 50
End: 2025-03-19
Payer: COMMERCIAL

## 2025-03-21 NOTE — PROCEDURES
[facility]   [address] [place] [address], [address], [place] [address]     Patient Name: Sunny Hayes  Gender:  Male  Exam Date: 03/19/2025 Visit Number:  [id number]  Age: 50 Years YOB: 1975  Attending MD: Siena Kovacs MD Medical Record#:  [id number]  -----------------------------------------------------------------------------------------------------------------------------   Procedure:    Upper GI Endoscopy   Indications:    Possible John's esophagus  Provider:        Siena Kovacs MD   Referring MD: Referral Self   Primary MD:      Kathryn Hernandez PAC  Medications:   Admitting Medication:   0.9% Normal Saline at TKO   Intra Procedure Medications:   Patient received monitored anesthesia care.     Complications: No immediate complications  ___________________________________________________________________________________________  Procedure:   An examination of the heart and lungs was performed within acceptable limits.  . The patient was therefore deemed a reasonable candidate for sedation.   The risks and benefits were explained to the patient, who appeared to understand. After obtaining informed consent, the scope was passed under direct vision. Throughout the procedure the patient's blood pressure, pulse and oxygen saturations were monitored.  The scope was introduced through the mouth and advanced to the second portion of duodenum.         Findings:   Esophagus:  The z-line is 40 centimeters from the incisors.  Top of the gastric folds is 40 centimeters from the incisors.  *Esophagus Comments:  One small island and two very short tongues of salmon-colored mucosa in the distal esophagus - these were biopsied.  Stomach:  H. Pylori biopsies taken.   The diaphragm hiatus is at 40 centimeters from the incisors.  *Stomach Comments:  One small polyp in the antrum and a few small polyps in the fundus which were biopsied.  Antral erythema with a few small antral  erosions.  Duodenum:  Normal duodenum.  Impression:   Gastropathy  Gastric polyps    Preliminary Plan:  Recommendation Comments:  Increase esomeprazole to 40 mg twice daily for 2 to 3 months, then decrease to 40 mg once daily thereafter.  Minimize NSAID use.  Pathology Results:  A: ESOPHAGUS, DISTAL, BIOPSY:           1. Columnar mucosa with intestinal metaplasia (see comment)           2. Negative for dysplasia           3. Squamous mucosa with inflammatory changes consistent with reflux esophagitis      B: STOMACH, BODY AND ANTRUM, POLYPS, BIOPSY:           1. Benign gastric xanthoma (see comment) and fragments of fundic gland polyp           2. Negative for dysplasia and malignancy           3. Negative for inflammation and atrophy      C: STOMACH, BIOPSY:           1. Reactive gastropathy, endoscopically erosive (see comment)               a. Sampling: Antral and body mucosae               b. Distribution: Antral mucosa           2. Negative for inflammation, atrophy and Helicobacter      COMMENTS  A. The intestinal metaplasia seen in this biopsy if seen in association with an endoscopically identified columnar lined esophagus is in keeping with John's esophagus; however, in the absence of these endoscopic findings this may represent intestinal metaplasia of the gastroesophageal junction which is a finding of uncertain clinical significance. There is no evidence of dysplasia.      B. Gastric xanthomas are benign polyps caused by foamy histiocytes expanding the lamina propria.  This finding is thought to be associated with a localized response to tissue injury and is not associated with a syndromic state. No additional clinical follow-up is indicated.      C. The likely etiology is an ongoing non-inflammatory type mucosal injury due to a chemical type of injury; this may be due to ingestion of non-steroidal anti-inflammatory drugs, aspirin (via prostaglandin-mediated injury), excess alcohol, corticosteroids,  or bile/alkaline reflux, the latter usually in the setting of a gastroenteric anastomosis.      MICROSCOPIC  A: Performed   B: Performed   C: Performed     Electronically signed by: Juan Carlos Lamas MD    Interpreted at [facility], [address] [place] [id number]    Orders    Instruction(s)/Education:  Instruction/Education Timeframe Assessment   NSAIDS List  K31.9       Final Plan:  Repeat Upper Endoscopy (EGD) in 3 years for John's.    We will attempt to contact you at appropriate intervals via [place] mail.  We may not be able to find you or contact you at that time, therefore you should know that the responsibility for following our recommendation rests with you.  If you don't hear from us at the time your procedure is due, please contact our office to schedule an appointment.  If your contact information should change, please contact our office so that we can update your record.  Additional Comments:  Increase esomeprazole to 40 mg twice daily for 2 to 3 months, then decrease to 40 mg once daily thereafter.  Minimize NSAID use.      _Electronically signed by:___________________  Siena Kovacs MD                 03/19/2025    cc: Kathryn Hernandez PAC

## 2025-04-02 ENCOUNTER — VIRTUAL VISIT (OUTPATIENT)
Dept: UROLOGY | Facility: CLINIC | Age: 50
End: 2025-04-02
Payer: COMMERCIAL

## 2025-04-02 DIAGNOSIS — N40.0 BENIGN PROSTATIC HYPERPLASIA, UNSPECIFIED WHETHER LOWER URINARY TRACT SYMPTOMS PRESENT: Primary | ICD-10-CM

## 2025-04-02 DIAGNOSIS — N52.9 ERECTILE DYSFUNCTION, UNSPECIFIED ERECTILE DYSFUNCTION TYPE: ICD-10-CM

## 2025-04-02 PROCEDURE — 98005 SYNCH AUDIO-VIDEO EST LOW 20: CPT | Performed by: NURSE PRACTITIONER

## 2025-04-02 PROCEDURE — 1126F AMNT PAIN NOTED NONE PRSNT: CPT | Performed by: NURSE PRACTITIONER

## 2025-04-02 RX ORDER — TAMSULOSIN HYDROCHLORIDE 0.4 MG/1
0.4 CAPSULE ORAL DAILY
Qty: 90 CAPSULE | Refills: 4 | Status: SHIPPED | OUTPATIENT
Start: 2025-04-02

## 2025-04-02 NOTE — PROGRESS NOTES
Virtual Visit Details  Type of service:  Video Visit   Originating Location (pt. Location): Home  Distant Location (provider location):  On-site  Platform used for Video Visit: [facility]      Urology Virtual Visit    Name: Sunny Hayes    MRN: [id number]   YOB: 1975               Chief Complaint:   ED, BPH, and LUTS         Impression and Plan:   Impression / Plan:   Sunny Hayes is a 50 year old male with BPH w LUTS, ED.      - Flomax refilled.   - Continue Viagra 25 mg PRN.  - Recommend annual screening PSA at time of preventative care visit blood work.   - Follow-up PRN.    Thank you for the opportunity to participate in the care of Sunny Hayes.     Arthur Trotter APRN, CNP  [facility] - Department of Urology  [phone]          History of Present Illness:   Sunny Hayes is a 50 year old male with BPH w LUTS greatly improved w Flomax. Mild ED responsive to 25 mg Viagra PRN. No other concerning signs/symptoms/complaints here today.     History is obtained from patient & EMR          Past Medical History:     Past Medical History:   Diagnosis Date    ASCVD (arteriosclerotic cardiovascular disease) 09/15/2023    Chest pain, unspecified type     Gastroesophageal reflux disease     Heart disease     Hypertension     Hypothyroidism     Precordial pain     Uncomplicated asthma           Past Surgical History:     Past Surgical History:   Procedure Laterality Date    CHOLECYSTECTOMY      COLONOSCOPY      ENT SURGERY      Tonsils    HERNIA REPAIR            Social History:     Social History     Tobacco Use    Smoking status: Former     Current packs/day: 0.00     Average packs/day: 1 pack/day for 15.0 years (15.0 ttl pk-yrs)     Types: Cigarettes, Dip, chew, snus or snuff     Start date: 1990     Quit date: 2005     Years since quittin.2     Passive exposure: Never    Smokeless tobacco: Former     Quit date: 2010   Substance Use Topics    Alcohol use: Not Currently      Alcohol/week: 1.0 - 2.0 standard drink of alcohol     Types: 1 - 2 Standard drinks or equivalent per week     Comment: 1-2 drinks per week          Family History:     Family History   Problem Relation Age of Onset    Diabetes Mother     Cerebrovascular Disease Mother     Coronary Artery Disease Father     Hypertension Father     Hyperlipidemia Father     Obesity Father     Cerebrovascular Disease Maternal Grandfather     Prostate Cancer No family hx of     Colon Cancer No family hx of     Breast Cancer No family hx of           Allergies:     Allergies   Allergen Reactions    Ace Inhibitors Unknown     Cough, leg swelling    Bicillin C-R, Unknown    Penicillins Difficulty breathing, Hives, Itching and Swelling     rash            Medications:     Current Outpatient Medications   Medication Sig Dispense Refill    albuterol (PROAIR HFA/PROVENTIL HFA/VENTOLIN HFA) 108 (90 Base) MCG/ACT inhaler Inhale 2 puffs into the lungs every 6 hours as needed for shortness of breath, wheezing or cough 18 g 3    aspirin 81 MG EC tablet Take 1 tablet (81 mg) by mouth daily.      esomeprazole (NEXIUM) 40 MG DR capsule Take 1 capsule (40 mg) by mouth every morning (before breakfast) 90 capsule 3    fluticasone-salmeterol (WIXELA INHUB) 500-50 MCG/ACT inhaler Inhale 1 puff into the lungs 2 times daily. 60 each 11    levothyroxine (SYNTHROID/LEVOTHROID) 150 MCG tablet Take 1 tablet (150 mcg) by mouth daily 90 tablet 3    multivitamin w/minerals (MULTI-VITAMIN) tablet Take 1 tablet by mouth daily.      olmesartan (BENICAR) 40 MG tablet Take 1 tablet (40 mg) by mouth daily. 90 tablet 4    ondansetron (ZOFRAN ODT) 4 MG ODT tab Take 1 tablet (4 mg) by mouth every 8 hours as needed for nausea. (Patient not taking: Reported on 2/14/2025) 10 tablet 0    rosuvastatin (CRESTOR) 5 MG tablet Take 1 tablet (5 mg) by mouth daily. 90 tablet 4    sildenafil (VIAGRA) 25 MG tablet Take 1-4 tablets ( mg) by mouth daily as needed (ED). 30 tablet  11    tamsulosin (FLOMAX) 0.4 MG capsule Take 1 capsule (0.4 mg) by mouth daily. 90 capsule 4     No current facility-administered medications for this visit.          Review of Systems:    ROS: See HPI for pertinent details.  Remainder of 10-point ROS negative.         Physical Exam:   VS:  T: Data Unavailable    HR: Data Unavailable    BP: Data Unavailable    RR: Data Unavailable     GEN:  Alert.  NAD.    HEENT:  Sclerae anicteric.    CV:  No obvious jugular venous distension  LUNGS: No respiratory distress, breathing comfortably wo accessory muscle use.  SKIN:  No visible rash  NEURO:  CN grossly intact.          Laboratory Data:     Lab Results   Component Value Date    PSA 0.39 02/02/2024     Lab Results   Component Value Date    CR 0.89 02/02/2024    CR 0.85 07/08/2023    CR 1.00 06/27/2023    CR 0.81 12/20/2021     Lab Results   Component Value Date    GFRESTIMATED >90 02/02/2024    GFRESTIMATED >90 07/08/2023    GFRESTIMATED >90 06/27/2023    GFRESTIMATED >90 12/20/2021

## 2025-04-02 NOTE — NURSING NOTE
Current patient location:  [place]    Is the patient currently in the state of [place]? YES    Visit mode: VIDEO    If the visit is dropped, the patient can be reconnected by:VIDEO VISIT: Text to cell phone:   Telephone Information:   Mobile [phone]       Will anyone else be joining the visit? NO  (If patient encounters technical issues they should call [phone])    Are changes needed to the allergy or medication list? No    Are refills needed on medications prescribed by this physician? NO    Rooming Documentation:  Questionnaire(s) completed    Reason for visit: RECHECK    Aisha MONTILLA

## 2025-04-02 NOTE — LETTER
4/2/2025       RE: Sunny Hayes  [address] [facility] [address]     Dear Colleague,    Thank you for referring your patient, Sunny Hayes, to the [facility] at [facility]. Please see a copy of my visit note below.    Virtual Visit Details  Type of service:  Video Visit   Originating Location (pt. Location): Home  Distant Location (provider location):  On-site  Platform used for Video Visit: [facility]      Urology Virtual Visit    Name: Sunny Hayes    MRN: [id number]   YOB: 1975               Chief Complaint:   ED, BPH, and LUTS         Impression and Plan:   Impression / Plan:   Sunny Hayes is a 50 year old male with BPH w LUTS, ED.      - Flomax refilled.   - Continue Viagra 25 mg PRN.  - Recommend annual screening PSA at time of preventative care visit blood work.   - Follow-up PRN.    Thank you for the opportunity to participate in the care of Sunny Hayes.     KASANDRA Howell, CNP   [facility] - Department of Urology  [phone]          History of Present Illness:   Sunny Hayes is a 50 year old male with BPH w LUTS greatly improved w Flomax. Mild ED responsive to 25 mg Viagra PRN. No other concerning signs/symptoms/complaints here today.     History is obtained from patient & EMR          Past Medical History:     Past Medical History:   Diagnosis Date     ASCVD (arteriosclerotic cardiovascular disease) 09/15/2023     Chest pain, unspecified type      Gastroesophageal reflux disease      Heart disease      Hypertension      Hypothyroidism      Precordial pain      Uncomplicated asthma           Past Surgical History:     Past Surgical History:   Procedure Laterality Date     CHOLECYSTECTOMY       COLONOSCOPY  2021     ENT SURGERY  1980    Tonsils     HERNIA REPAIR  1980          Social History:     Social History     Tobacco Use     Smoking status: Former     Current packs/day: 0.00      Average packs/day: 1 pack/day for 15.0 years (15.0 ttl pk-yrs)     Types: Cigarettes, Dip, chew, snus or snuff     Start date: 1990     Quit date: 2005     Years since quittin.2     Passive exposure: Never     Smokeless tobacco: Former     Quit date: 2010   Substance Use Topics     Alcohol use: Not Currently     Alcohol/week: 1.0 - 2.0 standard drink of alcohol     Types: 1 - 2 Standard drinks or equivalent per week     Comment: 1-2 drinks per week          Family History:     Family History   Problem Relation Age of Onset     Diabetes Mother      Cerebrovascular Disease Mother      Coronary Artery Disease Father      Hypertension Father      Hyperlipidemia Father      Obesity Father      Cerebrovascular Disease Maternal Grandfather      Prostate Cancer No family hx of      Colon Cancer No family hx of      Breast Cancer No family hx of           Allergies:     Allergies   Allergen Reactions     Ace Inhibitors Unknown     Cough, leg swelling     Bicillin C-R, Unknown     Penicillins Difficulty breathing, Hives, Itching and Swelling     rash            Medications:     Current Outpatient Medications   Medication Sig Dispense Refill     albuterol (PROAIR HFA/PROVENTIL HFA/VENTOLIN HFA) 108 (90 Base) MCG/ACT inhaler Inhale 2 puffs into the lungs every 6 hours as needed for shortness of breath, wheezing or cough 18 g 3     aspirin 81 MG EC tablet Take 1 tablet (81 mg) by mouth daily.       esomeprazole (NEXIUM) 40 MG DR capsule Take 1 capsule (40 mg) by mouth every morning (before breakfast) 90 capsule 3     fluticasone-salmeterol (WIXELA INHUB) 500-50 MCG/ACT inhaler Inhale 1 puff into the lungs 2 times daily. 60 each 11     levothyroxine (SYNTHROID/LEVOTHROID) 150 MCG tablet Take 1 tablet (150 mcg) by mouth daily 90 tablet 3     multivitamin w/minerals (MULTI-VITAMIN) tablet Take 1 tablet by mouth daily.       olmesartan (BENICAR) 40 MG tablet Take 1 tablet (40 mg) by mouth daily. 90 tablet 4      ondansetron (ZOFRAN ODT) 4 MG ODT tab Take 1 tablet (4 mg) by mouth every 8 hours as needed for nausea. (Patient not taking: Reported on 2/14/2025) 10 tablet 0     rosuvastatin (CRESTOR) 5 MG tablet Take 1 tablet (5 mg) by mouth daily. 90 tablet 4     sildenafil (VIAGRA) 25 MG tablet Take 1-4 tablets ( mg) by mouth daily as needed (ED). 30 tablet 11     tamsulosin (FLOMAX) 0.4 MG capsule Take 1 capsule (0.4 mg) by mouth daily. 90 capsule 4     No current facility-administered medications for this visit.          Review of Systems:    ROS: See HPI for pertinent details.  Remainder of 10-point ROS negative.         Physical Exam:   VS:  T: Data Unavailable    HR: Data Unavailable    BP: Data Unavailable    RR: Data Unavailable     GEN:  Alert.  NAD.    HEENT:  Sclerae anicteric.    CV:  No obvious jugular venous distension  LUNGS: No respiratory distress, breathing comfortably wo accessory muscle use.  SKIN:  No visible rash  NEURO:  CN grossly intact.          Laboratory Data:     Lab Results   Component Value Date    PSA 0.39 02/02/2024     Lab Results   Component Value Date    CR 0.89 02/02/2024    CR 0.85 07/08/2023    CR 1.00 06/27/2023    CR 0.81 12/20/2021     Lab Results   Component Value Date    GFRESTIMATED >90 02/02/2024    GFRESTIMATED >90 07/08/2023    GFRESTIMATED >90 06/27/2023    GFRESTIMATED >90 12/20/2021              Again, thank you for allowing me to participate in the care of your patient.      Sincerely,    Arthur Trotter, KASANDRA CNP

## 2025-04-14 ENCOUNTER — OFFICE VISIT (OUTPATIENT)
Dept: FAMILY MEDICINE | Facility: CLINIC | Age: 50
End: 2025-04-14
Payer: COMMERCIAL

## 2025-04-14 VITALS
OXYGEN SATURATION: 96 % | RESPIRATION RATE: 16 BRPM | HEART RATE: 85 BPM | TEMPERATURE: 98.4 F | WEIGHT: 210 LBS | HEIGHT: 72 IN | BODY MASS INDEX: 28.44 KG/M2 | DIASTOLIC BLOOD PRESSURE: 72 MMHG | SYSTOLIC BLOOD PRESSURE: 114 MMHG

## 2025-04-14 DIAGNOSIS — E03.9 HYPOTHYROIDISM, UNSPECIFIED TYPE: ICD-10-CM

## 2025-04-14 DIAGNOSIS — I10 ESSENTIAL HYPERTENSION: ICD-10-CM

## 2025-04-14 DIAGNOSIS — N40.0 BENIGN PROSTATIC HYPERPLASIA, UNSPECIFIED WHETHER LOWER URINARY TRACT SYMPTOMS PRESENT: ICD-10-CM

## 2025-04-14 DIAGNOSIS — K22.70 BARRETT'S ESOPHAGUS WITHOUT DYSPLASIA: ICD-10-CM

## 2025-04-14 DIAGNOSIS — J45.40 MODERATE PERSISTENT ASTHMA WITHOUT COMPLICATION: ICD-10-CM

## 2025-04-14 DIAGNOSIS — N52.9 ERECTILE DYSFUNCTION, UNSPECIFIED ERECTILE DYSFUNCTION TYPE: ICD-10-CM

## 2025-04-14 DIAGNOSIS — Z00.00 ROUTINE GENERAL MEDICAL EXAMINATION AT A HEALTH CARE FACILITY: Primary | ICD-10-CM

## 2025-04-14 DIAGNOSIS — G47.33 OSA (OBSTRUCTIVE SLEEP APNEA): ICD-10-CM

## 2025-04-14 PROCEDURE — 99396 PREV VISIT EST AGE 40-64: CPT | Performed by: PHYSICIAN ASSISTANT

## 2025-04-14 PROCEDURE — 3074F SYST BP LT 130 MM HG: CPT | Performed by: PHYSICIAN ASSISTANT

## 2025-04-14 PROCEDURE — 3078F DIAST BP <80 MM HG: CPT | Performed by: PHYSICIAN ASSISTANT

## 2025-04-14 PROCEDURE — G2211 COMPLEX E/M VISIT ADD ON: HCPCS | Performed by: PHYSICIAN ASSISTANT

## 2025-04-14 PROCEDURE — 99214 OFFICE O/P EST MOD 30 MIN: CPT | Mod: 25 | Performed by: PHYSICIAN ASSISTANT

## 2025-04-14 PROCEDURE — 1126F AMNT PAIN NOTED NONE PRSNT: CPT | Performed by: PHYSICIAN ASSISTANT

## 2025-04-14 RX ORDER — LEVOTHYROXINE SODIUM 150 UG/1
150 TABLET ORAL DAILY
Qty: 90 TABLET | Refills: 3 | Status: SHIPPED | OUTPATIENT
Start: 2025-04-14

## 2025-04-14 RX ORDER — FLUTICASONE PROPIONATE AND SALMETEROL 500; 50 UG/1; UG/1
1 POWDER RESPIRATORY (INHALATION) 2 TIMES DAILY
Qty: 60 EACH | Refills: 11 | Status: SHIPPED | OUTPATIENT
Start: 2025-04-14

## 2025-04-14 RX ORDER — ALBUTEROL SULFATE 90 UG/1
2 INHALANT RESPIRATORY (INHALATION) EVERY 6 HOURS PRN
Qty: 18 G | Refills: 3 | Status: SHIPPED | OUTPATIENT
Start: 2025-04-14

## 2025-04-14 SDOH — HEALTH STABILITY: PHYSICAL HEALTH: ON AVERAGE, HOW MANY MINUTES DO YOU ENGAGE IN EXERCISE AT THIS LEVEL?: 40 MIN

## 2025-04-14 SDOH — HEALTH STABILITY: PHYSICAL HEALTH: ON AVERAGE, HOW MANY DAYS PER WEEK DO YOU ENGAGE IN MODERATE TO STRENUOUS EXERCISE (LIKE A BRISK WALK)?: 4 DAYS

## 2025-04-14 ASSESSMENT — SOCIAL DETERMINANTS OF HEALTH (SDOH): HOW OFTEN DO YOU GET TOGETHER WITH FRIENDS OR RELATIVES?: ONCE A WEEK

## 2025-04-14 ASSESSMENT — PAIN SCALES - GENERAL: PAINLEVEL_OUTOF10: NO PAIN (0)

## 2025-04-14 ASSESSMENT — ASTHMA QUESTIONNAIRES: ACT_TOTALSCORE: 25

## 2025-04-14 NOTE — PATIENT INSTRUCTIONS
Patient Education   Preventive Care Advice   This is general advice given by our system to help you stay healthy. However, your care team may have specific advice just for you. Please talk to your care team about your preventive care needs.  Nutrition  Eat 5 or more servings of fruits and vegetables each day.  Try wheat bread, brown rice and whole grain pasta (instead of white bread, rice, and pasta).  Get enough calcium and vitamin D. Check the label on foods and aim for 100% of the RDA (recommended daily allowance).  Lifestyle  Exercise at least 150 minutes each week  (30 minutes a day, 5 days a week).  Do muscle strengthening activities 2 days a week. These help control your weight and prevent disease.  No smoking.  Wear sunscreen to prevent skin cancer.  Have a dental exam and cleaning every 6 months.  Yearly exams  See your health care team every year to talk about:  Any changes in your health.  Any medicines your care team has prescribed.  Preventive care, family planning, and ways to prevent chronic diseases.  Shots (vaccines)   HPV shots (up to age 26), if you've never had them before.  Hepatitis B shots (up to age 59), if you've never had them before.  COVID-19 shot: Get this shot when it's due.  Flu shot: Get a flu shot every year.  Tetanus shot: Get a tetanus shot every 10 years.  Pneumococcal, hepatitis A, and RSV shots: Ask your care team if you need these based on your risk.  Shingles shot (for age 50 and up)  General health tests  Diabetes screening:  Starting at age 35, Get screened for diabetes at least every 3 years.  If you are younger than age 35, ask your care team if you should be screened for diabetes.  Cholesterol test: At age 39, start having a cholesterol test every 5 years, or more often if advised.  Bone density scan (DEXA): At age 50, ask your care team if you should have this scan for osteoporosis (brittle bones).  Hepatitis C: Get tested at least once in your life.  STIs (sexually  transmitted infections)  Before age 24: Ask your care team if you should be screened for STIs.  After age 24: Get screened for STIs if you're at risk. You are at risk for STIs (including HIV) if:  You are sexually active with more than one person.  You don't use condoms every time.  You or a partner was diagnosed with a sexually transmitted infection.  If you are at risk for HIV, ask about PrEP medicine to prevent HIV.  Get tested for HIV at least once in your life, whether you are at risk for HIV or not.  Cancer screening tests  Cervical cancer screening: If you have a cervix, begin getting regular cervical cancer screening tests starting at age 21.  Breast cancer scan (mammogram): If you've ever had breasts, begin having regular mammograms starting at age 40. This is a scan to check for breast cancer.  Colon cancer screening: It is important to start screening for colon cancer at age 45.  Have a colonoscopy test every 10 years (or more often if you're at risk) Or, ask your provider about stool tests like a FIT test every year or Cologuard test every 3 years.  To learn more about your testing options, visit:   .  For help making a decision, visit:   https://bit.ly/wf23418.  Prostate cancer screening test: If you have a prostate, ask your care team if a prostate cancer screening test (PSA) at age 55 is right for you.  Lung cancer screening: If you are a current or former smoker ages 50 to 80, ask your care team if ongoing lung cancer screenings are right for you.  For informational purposes only. Not to replace the advice of your health care provider. Copyright   2023 [facility]. All rights reserved. Clinically reviewed by the [facility]. [employer] 190535 - REV 01/24.  Learning About Stress  What is stress?     Stress is your body's response to a hard situation. Your body can have a physical, emotional, or mental response. Stress is a fact of life for most people, and it  affects everyone differently. What causes stress for you may not be stressful for someone else.  A lot of things can cause stress. You may feel stress when you go on a job interview, take a test, or run a race. This kind of short-term stress is normal and even useful. It can help you if you need to work hard or react quickly. For example, stress can help you finish an important job on time.  Long-term stress is caused by ongoing stressful situations or events. Examples of long-term stress include long-term health problems, ongoing problems at work, or conflicts in your family. Long-term stress can harm your health.  How does stress affect your health?  When you are stressed, your body responds as though you are in danger. It makes hormones that speed up your heart, make you breathe faster, and give you a burst of energy. This is called the fight-or-flight stress response. If the stress is over quickly, your body goes back to normal and no harm is done.  But if stress happens too often or lasts too long, it can have bad effects. Long-term stress can make you more likely to get sick, and it can make symptoms of some diseases worse. If you tense up when you are stressed, you may develop neck, shoulder, or low back pain. Stress is linked to high blood pressure and heart disease.  Stress also harms your emotional health. It can make you griffin, tense, or depressed. Your relationships may suffer, and you may not do well at work or school.  What can you do to manage stress?  You can try these things to help manage stress:   Do something active. Exercise or activity can help reduce stress. Walking is a great way to get started. Even everyday activities such as housecleaning or yard work can help.  Try yoga or piyush chi. These techniques combine exercise and meditation. You may need some training at first to learn them.  Do something you enjoy. For example, listen to music or go to a movie. Practice your hobby or do volunteer  "work.  Meditate. This can help you relax, because you are not worrying about what happened before or what may happen in the future.  Do guided imagery. Imagine yourself in any setting that helps you feel calm. You can use online videos, books, or a teacher to guide you.  Do breathing exercises. For example:  From a standing position, bend forward from the waist with your knees slightly bent. Let your arms dangle close to the floor.  Breathe in slowly and deeply as you return to a standing position. Roll up slowly and lift your head last.  Hold your breath for just a few seconds in the standing position.  Breathe out slowly and bend forward from the waist.  Let your feelings out. Talk, laugh, cry, and express anger when you need to. Talking with supportive friends or family, a counselor, or a quan leader about your feelings is a healthy way to relieve stress. Avoid discussing your feelings with people who make you feel worse.  Write. It may help to write about things that are bothering you. This helps you find out how much stress you feel and what is causing it. When you know this, you can find better ways to cope.  What can you do to prevent stress?  You might try some of these things to help prevent stress:  Manage your time. This helps you find time to do the things you want and need to do.  Get enough sleep. Your body recovers from the stresses of the day while you are sleeping.  Get support. Your family, friends, and community can make a difference in how you experience stress.  Limit your news feed. Avoid or limit time on social media or news that may make you feel stressed.  Do something active. Exercise or activity can help reduce stress. Walking is a great way to get started.  Where can you learn more?  Go to https://www.[employer].net/patiented  Enter N032 in the search box to learn more about \"Learning About Stress.\"  Current as of: October 24, 2024  Content Version: 14.4 2024-2025 [email or web address].   Care instructions adapted under license by your healthcare professional. If you have questions about a medical condition or this instruction, always ask your healthcare professional. [employer] disclaims any warranty or liability for your use of this information.

## 2025-04-14 NOTE — PROGRESS NOTES
Preventive Care Visit  [facility] KARLAMOLINDY Hernandez PA-C, Family Medicine  Apr 14, 2025      Assessment & Plan     Routine general medical examination at a health care facility  He is overall doing well. He will be moving to [place] in 2 months with his wife and son so will transfer care to new clinic there.     Essential hypertension  Controlled with olmesartan, continue present management.  - Basic metabolic panel  (Ca, Cl, CO2, Creat, Gluc, K, Na, BUN); Future    Hypothyroidism, unspecified type  Chronic, clinically euthyroid. Due for monitoring labs.  - TSH with free T4 reflex; Future  - levothyroxine (SYNTHROID/LEVOTHROID) 150 MCG tablet; Take 1 tablet (150 mcg) by mouth daily.    Benign prostatic hyperplasia, unspecified whether lower urinary tract symptoms present  Erectile dysfunction, unspecified erectile dysfunction type  BPH, LUTS, ED  He saw urology 4/2/25:  - Flomax refilled.   - Continue Viagra 25 mg PRN.  - Recommend annual screening PSA at time of preventative care visit blood work.   - Follow-up PRN.  - PSA, screen; Future    John's esophagus without dysplasia  - lifelong ppi  Following with GI  EGD 3/19/25 showed gastropathy and gastric polyps. Plan to increase esomeprazole to 40 mg twice daily for 2 to 3 months, then decrease to 40 mg once daily thereafter. Minimize NSAID use. Repeat EGD in 3 years for monitoring - DUE 3/2028.  Lifelong PPI - esomeprazole 40 mg daily  Denies symptoms    Moderate persistent asthma without complication  Chronic, controlled. ACT score 25. Continue present management.  - albuterol (PROAIR HFA/PROVENTIL HFA/VENTOLIN HFA) 108 (90 Base) MCG/ACT inhaler; Inhale 2 puffs into the lungs every 6 hours as needed for shortness of breath, wheezing or cough.  - fluticasone-salmeterol (WIXELA INHUB) 500-50 MCG/ACT inhaler; Inhale 1 puff into the lungs 2 times daily.    GUCCI (obstructive sleep apnea)  Uses CPAP    BMI  Estimated body mass index is  28.48 kg/m  as calculated from the following:    Height as of this encounter: 1.829 m (6').    Weight as of this encounter: 95.3 kg (210 lb).   Weight management plan: Discussed healthy diet and exercise guidelines    Counseling  Appropriate preventive services were addressed with this patient via screening, questionnaire, or discussion as appropriate for fall prevention, nutrition, physical activity, Tobacco-use cessation, social engagement, weight loss and cognition.  Checklist reviewing preventive services available has been given to the patient.  Reviewed patient's diet, addressing concerns and/or questions.   He is at risk for psychosocial distress and has been provided with information to reduce risk.       The longitudinal plan of care for the diagnosis(es)/condition(s) as documented were addressed during this visit. Due to the added complexity in care, I will continue to support Sunny in the subsequent management and with ongoing continuity of care.      Subjective   Sunny is a 50 year old, presenting for the following:  Physical        4/14/2025     2:10 PM   Additional Questions   Roomed by nata          HPI    Moderate persistent asthma  Feels well controlled.  Advair BID (rinses mouth after use)  Albuterol rarely, hasn't needed in about 1 month (often only needs if allergies are worse)      2/1/2024     1:46 PM 11/4/2024     2:10 PM 4/14/2025     2:31 PM   ACT Total Scores   ACT TOTAL SCORE (Goal Greater than or Equal to 20) 22 25  25   In the past 12 months, how many times did you visit the emergency room for your asthma without being admitted to the hospital? 0 0 0   In the past 12 months, how many times were you hospitalized overnight because of your asthma? 0 0 0       Patient-reported     John's   Following with GI  EGD 3/19/25 showed gastropathy and gastric polyps. Plan to increase esomeprazole to 40 mg twice daily for 2 to 3 months, then decrease to 40 mg once daily thereafter. Minimize NSAID use.  "Repeat EGD in 3 years for monitoring - DUE 3/2028.  Lifelong PPI - esomeprazole 40 mg daily  Denies symptoms    Hypothyroidism  Taking levothyroxine 175 mcg daily  Thyroid feels stable    HTN, hyperlipidemia, CAD, family h/o premature CAD.  Taking olmesartan 40 mg, rosuvastatin 5 mg  Follows with cardiology, visit 10/2024:  \"Impression:  1.  Coronary artery disease based upon coronary calcification and a calcium score of 2.14.  The patient is asymptomatic and had a negative stress echocardiogram last year.  2.  Essential hypertension.  His blood pressure is well-controlled.  3.  Strong family history of premature coronary atherosclerosis.  4.  Patient is not taking a baby aspirin which I would recommend to him.\"    He started aspirin after visit with cardiology in 10/2024 but stopped after his EGD last month showed gastropathy and he had also noticed easy bruising. Easy bruising resolved after stopping aspirin.      Advance Care Planning  Patient does not have a Health Care Directive:       4/14/2025   General Health   How would you rate your overall physical health? Good   Feel stress (tense, anxious, or unable to sleep) To some extent   (!) STRESS CONCERN      4/14/2025   Nutrition   Three or more servings of calcium each day? Yes   Diet: Regular (no restrictions)   How many servings of fruit and vegetables per day? 4 or more   How many sweetened beverages each day? 0-1         4/14/2025   Exercise   Days per week of moderate/strenous exercise 4 days   Average minutes spent exercising at this level 40 min         4/14/2025   Social Factors   Frequency of gathering with friends or relatives Once a week   Worry food won't last until get money to buy more No   Food not last or not have enough money for food? No   Do you have housing? (Housing is defined as stable permanent housing and does not include staying ouside in a car, in a tent, in an abandoned building, in an overnight shelter, or couch-surfing.) Yes   Are " you worried about losing your housing? No   Lack of transportation? No   Unable to get utilities (heat,electricity)? No         2025   Fall Risk   Fallen 2 or more times in the past year? No   Trouble with walking or balance? No          2025   Dental   Dentist two times every year? Yes             Today's PHQ-2 Score:       2025     1:56 PM   PHQ-2 (  [employer])   Q1: Little interest or pleasure in doing things 0   Q2: Feeling down, depressed or hopeless 0   PHQ-2 Score 0         2025   Substance Use   Alcohol more than 3/day or more than 7/wk No   Do you use any other substances recreationally? No     Social History     Tobacco Use    Smoking status: Former     Current packs/day: 0.00     Average packs/day: 1 pack/day for 15.0 years (15.0 ttl pk-yrs)     Types: Cigarettes, Dip, chew, snus or snuff     Start date: 1990     Quit date: 2005     Years since quittin.2     Passive exposure: Never    Smokeless tobacco: Former     Quit date: 2010   Vaping Use    Vaping status: Never Used   Substance Use Topics    Alcohol use: Not Currently     Alcohol/week: 1.0 - 2.0 standard drink of alcohol     Types: 1 - 2 Standard drinks or equivalent per week     Comment: 1-2 drinks per week    Drug use: Never           2025   STI Screening   New sexual partner(s) since last STI/HIV test? No   ASCVD Risk   The 10-year ASCVD risk score (Dilip LE, et al., 2019) is: 1.9%    Values used to calculate the score:      Age: 50 years      Sex: Male      Is Non- : No      Diabetic: No      Tobacco smoker: No      Systolic Blood Pressure: 114 mmHg      Is BP treated: Yes      HDL Cholesterol: 57 mg/dL      Total Cholesterol: 134 mg/dL           Reviewed and updated as needed this visit by Provider   Tobacco  Allergies  Meds  Problems  Med Hx  Surg Hx  Fam Hx            Past Medical History:   Diagnosis Date    ASCVD (arteriosclerotic cardiovascular disease)  09/15/2023    Chest pain, unspecified type     Gastroesophageal reflux disease     Heart disease     Hypertension     Hypothyroidism     Precordial pain     Uncomplicated asthma      Past Surgical History:   Procedure Laterality Date    CHOLECYSTECTOMY      COLONOSCOPY      ENT SURGERY      Tonsils    HERNIA REPAIR       Labs reviewed in EPIC  BP Readings from Last 3 Encounters:   25 114/72   24 100/67   10/31/24 100/62    Wt Readings from Last 3 Encounters:   25 95.3 kg (210 lb)   25 90.7 kg (200 lb)   24 93 kg (205 lb)                  Patient Active Problem List   Diagnosis    Hypothyroidism    Essential hypertension    John's esophagus without dysplasia  - lifelong ppi    Moderate persistent asthma without complication    Gilbert's syndrome    Beta thalassemia - baseline Hgb 10    GUCCI (obstructive sleep apnea)    Family history of coronary artery disease occurring prior to 55 years of age    ASCVD (arteriosclerotic cardiovascular disease)    Benign prostatic hyperplasia, unspecified whether lower urinary tract symptoms present    Erectile dysfunction, unspecified erectile dysfunction type     Past Surgical History:   Procedure Laterality Date    CHOLECYSTECTOMY      COLONOSCOPY      ENT SURGERY      Tonsils    HERNIA REPAIR         Social History     Tobacco Use    Smoking status: Former     Current packs/day: 0.00     Average packs/day: 1 pack/day for 15.0 years (15.0 ttl pk-yrs)     Types: Cigarettes, Dip, chew, snus or snuff     Start date: 1990     Quit date: 2005     Years since quittin.2     Passive exposure: Never    Smokeless tobacco: Former     Quit date: 2010   Substance Use Topics    Alcohol use: Not Currently     Alcohol/week: 1.0 - 2.0 standard drink of alcohol     Types: 1 - 2 Standard drinks or equivalent per week     Comment: 1-2 drinks per week     Family History   Problem Relation Age of Onset    Diabetes Mother      Cerebrovascular Disease Mother     Coronary Artery Disease Father     Hypertension Father     Hyperlipidemia Father     Obesity Father     Cerebrovascular Disease Maternal Grandfather     Prostate Cancer No family hx of     Colon Cancer No family hx of     Breast Cancer No family hx of          Current Outpatient Medications   Medication Sig Dispense Refill    albuterol (PROAIR HFA/PROVENTIL HFA/VENTOLIN HFA) 108 (90 Base) MCG/ACT inhaler Inhale 2 puffs into the lungs every 6 hours as needed for shortness of breath, wheezing or cough. 18 g 3    esomeprazole (NEXIUM) 40 MG DR capsule Take 1 capsule (40 mg) by mouth every morning (before breakfast) (Patient taking differently: Take 40 mg by mouth 2 times daily.) 90 capsule 3    fluticasone-salmeterol (WIXELA INHUB) 500-50 MCG/ACT inhaler Inhale 1 puff into the lungs 2 times daily. 60 each 11    levothyroxine (SYNTHROID/LEVOTHROID) 150 MCG tablet Take 1 tablet (150 mcg) by mouth daily. 90 tablet 3    multivitamin w/minerals (MULTI-VITAMIN) tablet Take 1 tablet by mouth daily.      olmesartan (BENICAR) 40 MG tablet Take 1 tablet (40 mg) by mouth daily. 90 tablet 4    rosuvastatin (CRESTOR) 5 MG tablet Take 1 tablet (5 mg) by mouth daily. 90 tablet 4    sildenafil (VIAGRA) 25 MG tablet Take 1-4 tablets ( mg) by mouth daily as needed (ED). 30 tablet 11    tamsulosin (FLOMAX) 0.4 MG capsule Take 1 capsule (0.4 mg) by mouth daily. 90 capsule 4     Allergies   Allergen Reactions    Ace Inhibitors Unknown     Cough, leg swelling    Bicillin C-R, Unknown    Penicillins Difficulty breathing, Hives, Itching and Swelling     rash       Recent Labs   Lab Test 11/11/24  0724 05/17/24  1319 03/25/24  1556 02/02/24  1401 11/10/23  0710 09/14/23  0707 07/08/23  1646 07/08/23  1422   A1C  --   --   --   --   --   --   --  5.0   LDL 65  --   --   --  55 109*   < >  --    HDL 57  --   --   --  55 51   < >  --    TRIG 59  --   --   --  77 111   < >  --    ALT 24  --   --   --  25 19   --   --    CR  --   --   --  0.89  --   --   --  0.85   GFRESTIMATED  --   --   --  >90  --   --   --  >90   POTASSIUM  --   --   --  4.1  --   --   --  3.5   TSH  --  1.08 0.24* 0.16*  --   --   --   --     < > = values in this interval not displayed.          Review of Systems  Constitutional, HEENT, cardiovascular, pulmonary, gi and gu systems are negative, except as otherwise noted.     Objective    Exam  /72   Pulse 85   Temp 98.4  F (36.9  C)   Resp 16   Ht 1.829 m (6')   Wt 95.3 kg (210 lb)   SpO2 96%   BMI 28.48 kg/m     Estimated body mass index is 28.48 kg/m  as calculated from the following:    Height as of this encounter: 1.829 m (6').    Weight as of this encounter: 95.3 kg (210 lb).    Physical Exam  GENERAL: alert and no distress  EYES: Eyes grossly normal to inspection, PERRL and conjunctivae and sclerae normal  HENT: ear canals and TM's normal, nose and mouth without ulcers or lesions  NECK: no adenopathy, no asymmetry, masses, or scars  RESP: lungs clear to auscultation - no rales, rhonchi or wheezes  CV: regular rate and rhythm, normal S1 S2, no S3 or S4, no murmur, click or rub, no peripheral edema  ABDOMEN: soft, nontender, no hepatosplenomegaly, no masses and bowel sounds normal  MS: no gross musculoskeletal defects noted, no edema  NEURO: Normal strength and tone, mentation intact and speech normal  PSYCH: mentation appears normal, affect normal/bright        Signed Electronically by: Kathryn Hernandez PA-C

## 2025-04-18 ENCOUNTER — MYC REFILL (OUTPATIENT)
Dept: FAMILY MEDICINE | Facility: CLINIC | Age: 50
End: 2025-04-18
Payer: COMMERCIAL

## 2025-04-18 DIAGNOSIS — J45.40 MODERATE PERSISTENT ASTHMA WITHOUT COMPLICATION: ICD-10-CM

## 2025-04-18 RX ORDER — ALBUTEROL SULFATE 90 UG/1
INHALANT RESPIRATORY (INHALATION)
Qty: 18 G | Refills: 3 | Status: CANCELLED | OUTPATIENT
Start: 2025-04-18

## 2025-04-18 RX ORDER — FLUTICASONE PROPIONATE AND SALMETEROL 500; 50 UG/1; UG/1
1 POWDER RESPIRATORY (INHALATION) 2 TIMES DAILY
Qty: 60 EACH | Refills: 11 | Status: CANCELLED | OUTPATIENT
Start: 2025-04-18

## 2025-04-21 RX ORDER — ALBUTEROL SULFATE 90 UG/1
INHALANT RESPIRATORY (INHALATION)
Qty: 18 G | Refills: 3 | OUTPATIENT
Start: 2025-04-21

## 2025-04-21 RX ORDER — FLUTICASONE PROPIONATE AND SALMETEROL 500; 50 UG/1; UG/1
1 POWDER RESPIRATORY (INHALATION) 2 TIMES DAILY
Qty: 60 EACH | Refills: 11 | OUTPATIENT
Start: 2025-04-21

## 2025-04-28 ENCOUNTER — LAB (OUTPATIENT)
Dept: LAB | Facility: CLINIC | Age: 50
End: 2025-04-28
Payer: COMMERCIAL

## 2025-04-28 DIAGNOSIS — E03.9 HYPOTHYROIDISM, UNSPECIFIED TYPE: ICD-10-CM

## 2025-04-28 DIAGNOSIS — N40.0 BENIGN PROSTATIC HYPERPLASIA, UNSPECIFIED WHETHER LOWER URINARY TRACT SYMPTOMS PRESENT: ICD-10-CM

## 2025-04-28 DIAGNOSIS — N52.9 ERECTILE DYSFUNCTION, UNSPECIFIED ERECTILE DYSFUNCTION TYPE: ICD-10-CM

## 2025-04-28 DIAGNOSIS — I10 ESSENTIAL HYPERTENSION: ICD-10-CM

## 2025-04-28 LAB
ANION GAP SERPL CALCULATED.3IONS-SCNC: 7 MMOL/L (ref 7–15)
BUN SERPL-MCNC: 16.3 MG/DL (ref 6–20)
CALCIUM SERPL-MCNC: 9 MG/DL (ref 8.8–10.4)
CHLORIDE SERPL-SCNC: 104 MMOL/L (ref 98–107)
CREAT SERPL-MCNC: 0.86 MG/DL (ref 0.67–1.17)
EGFRCR SERPLBLD CKD-EPI 2021: >90 ML/MIN/1.73M2
GLUCOSE SERPL-MCNC: 97 MG/DL (ref 70–99)
HCO3 SERPL-SCNC: 26 MMOL/L (ref 22–29)
POTASSIUM SERPL-SCNC: 4.1 MMOL/L (ref 3.4–5.3)
SODIUM SERPL-SCNC: 137 MMOL/L (ref 135–145)
TSH SERPL DL<=0.005 MIU/L-ACNC: 3.76 UIU/ML (ref 0.3–4.2)

## 2025-04-28 PROCEDURE — 84443 ASSAY THYROID STIM HORMONE: CPT

## 2025-04-28 PROCEDURE — G0103 PSA SCREENING: HCPCS

## 2025-04-28 PROCEDURE — 80048 BASIC METABOLIC PNL TOTAL CA: CPT

## 2025-04-28 PROCEDURE — 36415 COLL VENOUS BLD VENIPUNCTURE: CPT

## 2025-04-29 LAB — PSA SERPL DL<=0.01 NG/ML-MCNC: 0.4 NG/ML (ref 0–3.5)
